# Patient Record
Sex: FEMALE | Race: WHITE | NOT HISPANIC OR LATINO | Employment: OTHER | URBAN - METROPOLITAN AREA
[De-identification: names, ages, dates, MRNs, and addresses within clinical notes are randomized per-mention and may not be internally consistent; named-entity substitution may affect disease eponyms.]

---

## 2017-03-24 ENCOUNTER — HOSPITAL ENCOUNTER (EMERGENCY)
Facility: MEDICAL CENTER | Age: 72
End: 2017-03-24
Attending: EMERGENCY MEDICINE
Payer: OTHER MISCELLANEOUS

## 2017-03-24 VITALS
HEART RATE: 78 BPM | WEIGHT: 118 LBS | BODY MASS INDEX: 21.71 KG/M2 | SYSTOLIC BLOOD PRESSURE: 135 MMHG | DIASTOLIC BLOOD PRESSURE: 81 MMHG | HEIGHT: 62 IN | RESPIRATION RATE: 16 BRPM | OXYGEN SATURATION: 91 % | TEMPERATURE: 97.7 F
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
## 2017-03-24 DIAGNOSIS — S16.1XXA NECK STRAIN, INITIAL ENCOUNTER: ICD-10-CM

## 2017-03-24 PROCEDURE — 99284 EMERGENCY DEPT VISIT MOD MDM: CPT

## 2017-03-24 ASSESSMENT — LIFESTYLE VARIABLES
HAVE YOU EVER FELT YOU SHOULD CUT DOWN ON YOUR DRINKING: NO
EVER FELT BAD OR GUILTY ABOUT YOUR DRINKING: NO
EVER HAD A DRINK FIRST THING IN THE MORNING TO STEADY YOUR NERVES TO GET RID OF A HANGOVER: NO
AVERAGE NUMBER OF DAYS PER WEEK YOU HAVE A DRINK CONTAINING ALCOHOL: 3
HOW MANY TIMES IN THE PAST YEAR HAVE YOU HAD 5 OR MORE DRINKS IN A DAY: 0
CONSUMPTION TOTAL: NEGATIVE
ON A TYPICAL DAY WHEN YOU DRINK ALCOHOL HOW MANY DRINKS DO YOU HAVE: 1
TOTAL SCORE: 0
DO YOU DRINK ALCOHOL: YES
TOTAL SCORE: 0
HAVE PEOPLE ANNOYED YOU BY CRITICIZING YOUR DRINKING: NO
TOTAL SCORE: 0

## 2017-03-24 NOTE — ED AVS SNAPSHOT
[employer] Access Code: Activation code not generated  Current [employer] Status: Active    HeyStakshart  A secure, online tool to manage your health information     [employer]’s [employer]® is a secure, online tool that connects you to your personalized health information from the privacy of your home -- day or night - making it very easy for you to manage your healthcare. Once the activation process is completed, you can even access your medical information using the [employer] kerrie, which is available for free in the Apple Kerrie store or Google Play store.     [employer] provides the following levels of access (as shown below):   My Chart Features   [facility] Primary Care Doctor [facility]  Specialists [facility]  Urgent  Care Non-[facility]  Primary Care  Doctor   Email your healthcare team securely and privately 24/7 X X X X   Manage appointments: schedule your next appointment; view details of past/upcoming appointments X      Request prescription refills. X      View recent personal medical records, including lab and immunizations X X X X   View health record, including health history, allergies, medications X X X X   Read reports about your outpatient visits, procedures, consult and ER notes X X X X   See your discharge summary, which is a recap of your hospital and/or ER visit that includes your diagnosis, lab results, and care plan. X X       How to register for [employer]:  1. Go to  [email or web address].  2. Click on the Sign Up Now box, which takes you to the New Member Sign Up page. You will need to provide the following information:  a. Enter your [employer] Access Code exactly as it appears at the top of this page. (You will not need to use this code after you’ve completed the sign-up process. If you do not sign up before the expiration date, you must request a new code.)   b. Enter your date of birth.   c. Enter your home email address.   d. Click Submit, and follow the next screen’s instructions.  3. Create a Encompass Media ID. This will  be your Blinkbuggy login ID and cannot be changed, so think of one that is secure and easy to remember.  4. Create a Blinkbuggy password. You can change your password at any time.  5. Enter your Password Reset Question and Answer. This can be used at a later time if you forget your password.   6. Enter your e-mail address. This allows you to receive e-mail notifications when new information is available in Blinkbuggy.  7. Click Sign Up. You can now view your health information.    For assistance activating your Blinkbuggy account, call [phone]
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
## 2017-03-24 NOTE — ED AVS SNAPSHOT
Home Care Instructions                                                                                                                Hermelinda Wasserman   MRN: [id number]    Department:  [facility], Emergency Dept   Date of Visit:  3/24/2017            [facility], Emergency Dept    [address]    [place] [address]    Phone:  [phone]      You were seen by     Obi Mcgee M.D.      Your Diagnosis Was     Neck strain, initial encounter     S16.1XXA       Follow-up Information     1. Schedule an appointment as soon as possible for a visit with Jane Queen M.D..    Specialty:  Family Medicine    Why:  As needed    Contact information    [address]  [place] [address] [phone]        Medication Information     Review all of your home medications and newly ordered medications with your primary doctor and/or pharmacist as soon as possible. Follow medication instructions as directed by your doctor and/or pharmacist.     Please keep your complete medication list with you and share with your physician. Update the information when medications are discontinued, doses are changed, or new medications (including over-the-counter products) are added; and carry medication information at all times in the event of emergency situations.               Medication List      ASK your doctor about these medications        Instructions    Morning Afternoon Evening Bedtime    alendronate 70 MG Tabs   Commonly known as:  FOSAMAX        TAKE 1 TABLET ORALLY EVERY 7 DAYS.TAKE ON AN EMPTY STOMACH.NO FOOD OR DRINK FOR 30 MIN                        aspirin 81 MG tablet        Take 81 mg by mouth every day.   Dose:  81 mg                        atorvastatin 20 MG Tabs   Commonly known as:  LIPITOR        Take 1 Tab by mouth every bedtime.   Dose:  20 mg                        CALCIUM 500 + D PO        Take  by mouth. 2 daily                        Cholecalciferol 400 UNIT Tabs      Commonly known as:  VITAMIN D        Take 400 Units by mouth every day.   Dose:  400 Units                        cyanocobalamin 500 MCG Tabs   Commonly known as:  VITAMIN B-12        Take 500 mcg by mouth every day.   Dose:  500 mcg                        FIBER SELECT GUMMIES PO        Take  by mouth.                        FISH OIL        Take 1,000 mg by mouth every day.   Dose:  1000 mg                        GLUCOSAMINE 1500 COMPLEX Caps        Take  by mouth.                        naproxen 500 MG Tabs   Commonly known as:  NAPROSYN        TAKE 1 TABLET BY MOUTH 2 TIMES A DAY, WITH MEALS.                        PROAIR  (90 BASE) MCG/ACT Aers inhalation aerosol   Generic drug:  albuterol        Doctor's comments:  Ok to use any formulation of albuterol inhaler per patient or insurance preference   INHALE 2 PUFFS BY MOUTH EVERY FOUR HOURS AS NEEDED FOR SHORTNESS OF BREATH.                        ranitidine 150 MG Tabs   Commonly known as:  ZANTAC        Take 1 Tab by mouth every day. To prevent ulcer   Dose:  150 mg                        tetanus-dipth-acell pertussis 5-2-15.5 LF-MCG/0.5 Susp   Commonly known as:  ADACEL        Doctor's comments:  Adacel or other form of Tdap OK   0.5 mL by Intramuscular route Once PRN for up to 1 dose.   Dose:  0.5 mL                        valacyclovir 1 GM Tabs   Commonly known as:  VALTREX        TAKE 1 TABLET TWICE A DAY                                  Discharge Instructions       Neck Injury  (Cervical Strain, Care After)    Take Tylenol or naproxen 375 mg every 8-12 hours unless an abscess the stomach..  Return for weakness or fever and headache.  See your doctor if not better in 7-10 days.    You had a borderline or high normal blood pressure reading today.  This does not necessarily mean you have hypertension.  Please followup with your/a primary physician for comprehensive blood pressure evaluation and yearly fasting cholesterol assessment.  BP Readings from  Last 3 Encounters:   03/24/17 135/81   09/09/16 118/80   09/01/16 110/80     .     You have a cervical strain. The muscles and ligaments in your neck have been stretched. The bones are not broken.    HOME CARE INSTRUCTIONS  Ø While awake, apply ice packs to the neck or areas of pain about every 1-2 hours, for 20 to 30 minutes at a time. Do this for 2 days or as directed. If you were given a cervical collar for support, ask your caregiver if you may remove it for bathing or applying ice.  Ø After 2 days, you may apply heat to the area for 20 to 30 minutes, 4 to 5 times a day.  Ø If given a [place] collar, wear as instructed. Do not remove any collar unless instructed by a caregiver.  Ø Take prescribed medication as directed. You may use ibuprofen (Advil® or Motrin®) and acetaminophen (Tylenol®) for discomfort. Only use these if your caregiver has not given medications that interfere    Recheck with the hospital or clinic after a radiologist has read your x-rays. Recheck with the hospital or clinic to make sure the initial readings are correct. Do this also to determine if you need further studies. It is your responsibility to find out your x-ray results. X-rays are sometimes repeated in one week to ten days. These are often repeated to make sure that a hairline fracture was not overlooked. Ask your caregiver how you are to find out about your radiology (x-ray) results.    seek immediate medical attention IF:  Ø You develop difficulties swallowing or breathing.  Ø You have numbness, weakness or movement problems in you or your arms or legs.  Ø You develop increasing pain which is uncontrolled with medications.    AGREEMENT BETWEEN PATIENT AND HEALTHCARE TEAM:  Your signature on this document represents an understanding between you and the healthcare team that took care of you today.  That means that you:  Ø Understand these discharge instructions.   Ø Will monitor your condition.  Ø Will seek immediate medical  attention as instructed.    Document Released: 12/18/2006  Document Re-Released: 06/30/2008  [employer]Care® Patient Information ©2008 [employer].            Patient Information     Patient Information    Following emergency treatment: all patient requiring follow-up care must return either to a private physician or a clinic if your condition worsens before you are able to obtain further medical attention, please return to the emergency room.     Billing Information    At [facility], we work to make the billing process streamlined for our patients.  Our Representatives are here to answer any questions you may have regarding your hospital bill.  If you have insurance coverage and have supplied your insurance information to us, we will submit a claim to your insurer on your behalf.  Should you have any questions regarding your bill, we can be reached online or by phone as follows:  Online: You are able pay your bills online or live chat with our representatives about any billing questions you may have. We are here to help Monday - Friday from 8:00am to 7:30pm and 9:00am - 12:00pm on Saturdays.  Please visit [email or web address]  for more information.   Phone:  [phone] or [phone]    Please note that your emergency physician, surgeon, pathologist, radiologist, anesthesiologist, and other specialists are not employed by [facility] and will therefore bill separately for their services.  Please contact them directly for any questions concerning their bills at the numbers below:     Emergency Physician Services:  [phone]  [facility]:  [phone]  [facility]:  [phone]  [facility]:  [phone]    1. Your final bill may vary from the amount quoted upon discharge if all procedures are not complete at that time, or if your doctor has additional procedures of which we are not aware. You will receive an additional bill if you  return to the Emergency Department at [facility] for suture removal regardless of the facility of which the sutures were placed.     2. Please arrange for settlement of this account at the emergency registration.    3. All self-pay accounts are due in full at the time of treatment.  If you are unable to meet this obligation then payment is expected within 4-5 days.     4. If you have had radiology studies (CT, X-ray, Ultrasound, MRI), you have received a preliminary result during your emergency department visit. Please contact the radiology department [phone] to receive a copy of your final result. Please discuss the Final result with your primary physician or with the follow up physician provided.     Crisis Hotline:  [place] Crisis Hotline:  9-867-AOAPBLU or 1-843.634.9102  [place] Crisis Hotline:    1-772.812.4277 or 259-995-6900         ED Discharge Follow Up Questions    1. In order to provide you with very good care, we would like to follow up with a phone call in the next few days.  May we have your permission to contact you?     YES /  NO    2. What is the best phone number to call you? (       )_____-__________    3. What is the best time to call you?      Morning  /  Afternoon  /  Evening                   Patient Signature:  ____________________________________________________________    Date:  ____________________________________________________________      Your appointments     Sep 11, 2017 10:10 AM   ANNUAL EXAM PREVENTATIVE with Jane Queen M.D.   [facility] [address] [place] [address])    [address] [place] [id number] [phone]

## 2017-03-24 NOTE — ED AVS SNAPSHOT
3/24/2017          Hermelinda Wasserman  [address] [address] [address]    Dear Hermelinda:    [facility] wants to ensure your discharge home is safe and you or your loved ones have had all your questions answered regarding your care after you leave the hospital.    You may receive a telephone call within two days of your discharge.  This call is to make certain you understand your discharge instructions as well as ensure we provided you with the best care possible during your stay with us.     The call will only last approximately 3-5 minutes and will be done by a nurse.    Once again, we want to ensure your discharge home is safe and that you have a clear understanding of any next steps in your care.  If you have any questions or concerns, please do not hesitate to contact us, we are here for you.  Thank you for choosing [facility] for your healthcare needs.    Sincerely,    Flako Muse    [facility]

## 2017-03-25 NOTE — DISCHARGE INSTRUCTIONS
Neck Injury  (Cervical Strain, Care After)    Take Tylenol or naproxen 375 mg every 8-12 hours unless an abscess the stomach..  Return for weakness or fever and headache.  See your doctor if not better in 7-10 days.    You had a borderline or high normal blood pressure reading today.  This does not necessarily mean you have hypertension.  Please followup with your/a primary physician for comprehensive blood pressure evaluation and yearly fasting cholesterol assessment.  BP Readings from Last 3 Encounters:   03/24/17 135/81   09/09/16 118/80   09/01/16 110/80     .     You have a cervical strain. The muscles and ligaments in your neck have been stretched. The bones are not broken.    HOME CARE INSTRUCTIONS  Ø While awake, apply ice packs to the neck or areas of pain about every 1-2 hours, for 20 to 30 minutes at a time. Do this for 2 days or as directed. If you were given a cervical collar for support, ask your caregiver if you may remove it for bathing or applying ice.  Ø After 2 days, you may apply heat to the area for 20 to 30 minutes, 4 to 5 times a day.  Ø If given a [place] collar, wear as instructed. Do not remove any collar unless instructed by a caregiver.  Ø Take prescribed medication as directed. You may use ibuprofen (Advil® or Motrin®) and acetaminophen (Tylenol®) for discomfort. Only use these if your caregiver has not given medications that interfere    Recheck with the hospital or clinic after a radiologist has read your x-rays. Recheck with the hospital or clinic to make sure the initial readings are correct. Do this also to determine if you need further studies. It is your responsibility to find out your x-ray results. X-rays are sometimes repeated in one week to ten days. These are often repeated to make sure that a hairline fracture was not overlooked. Ask your caregiver how you are to find out about your radiology (x-ray) results.    seek immediate medical attention IF:  Ø You develop  difficulties swallowing or breathing.  Ø You have numbness, weakness or movement problems in you or your arms or legs.  Ø You develop increasing pain which is uncontrolled with medications.    AGREEMENT BETWEEN PATIENT AND HEALTHCARE TEAM:  Your signature on this document represents an understanding between you and the healthcare team that took care of you today.  That means that you:  Ø Understand these discharge instructions.   Ø Will monitor your condition.  Ø Will seek immediate medical attention as instructed.    Document Released: 12/18/2006  Document Re-Released: 06/30/2008  [employer]® Patient Information ©2008 [employer], MyPublisher.
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
## 2017-04-03 ENCOUNTER — PATIENT MESSAGE (OUTPATIENT)
Dept: MEDICAL GROUP | Age: 72
End: 2017-04-03

## 2017-04-04 ENCOUNTER — NON-PROVIDER VISIT (OUTPATIENT)
Dept: MEDICAL GROUP | Age: 72
End: 2017-04-04
Payer: MEDICARE

## 2017-04-04 ENCOUNTER — OFFICE VISIT (OUTPATIENT)
Dept: MEDICAL GROUP | Age: 72
End: 2017-04-04
Payer: MEDICARE

## 2017-04-04 VITALS
WEIGHT: 121.6 LBS | SYSTOLIC BLOOD PRESSURE: 110 MMHG | HEIGHT: 64 IN | DIASTOLIC BLOOD PRESSURE: 68 MMHG | HEART RATE: 89 BPM | TEMPERATURE: 97.2 F | OXYGEN SATURATION: 97 % | BODY MASS INDEX: 20.76 KG/M2

## 2017-04-04 DIAGNOSIS — M62.838 NECK MUSCLE SPASM: ICD-10-CM

## 2017-04-04 DIAGNOSIS — M25.562 ACUTE PAIN OF LEFT KNEE: ICD-10-CM
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
## 2017-04-04 DIAGNOSIS — M54.2 NECK PAIN: ICD-10-CM

## 2017-04-04 DIAGNOSIS — Z23 NEED FOR VACCINATION: ICD-10-CM

## 2017-04-04 PROCEDURE — 99213 OFFICE O/P EST LOW 20 MIN: CPT | Performed by: PHYSICIAN ASSISTANT

## 2017-04-04 PROCEDURE — 90732 PPSV23 VACC 2 YRS+ SUBQ/IM: CPT | Performed by: PHYSICIAN ASSISTANT

## 2017-04-04 PROCEDURE — G0009 ADMIN PNEUMOCOCCAL VACCINE: HCPCS | Performed by: PHYSICIAN ASSISTANT

## 2017-04-04 RX ORDER — CEFTRIAXONE 1 G/1
1 INJECTION, POWDER, FOR SOLUTION INTRAMUSCULAR; INTRAVENOUS ONCE
Status: CANCELLED | OUTPATIENT
Start: 2017-04-04 | End: 2017-04-05

## 2017-04-04 ASSESSMENT — PATIENT HEALTH QUESTIONNAIRE - PHQ9: CLINICAL INTERPRETATION OF PHQ2 SCORE: 0
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
## 2017-04-04 NOTE — PROGRESS NOTES
Hermelinda Wasserman is a 71 y.o. female here for a non-provider visit for:   PNEUMOVAX (PPSV23)    Reason for immunization: Overdue/Provider Recommended  Immunization records indicate need for vaccine: Yes  Minimum interval has been met for this vaccine: Yes  ABN completed: Not Indicated    Order and dose verified by:  VIS Dated  4/24/15 was given to patient: Yes  IAC Questionnaire abnormal. Questionnaire reviewed and administration of injection approved by provider: Ashley Frankel    Patient tolerated injection and no adverse effects were observed or reported .    Pt scheduled for next dose in series: Not Indicated

## 2017-04-04 NOTE — MR AVS SNAPSHOT
Hermelinda Wasserman   2017 10:00 AM   Non-Provider Visit   MRN: [id number]    Department:  [address] [place] [address]   Dept Phone:  [phone]    Description:  Female : 1945   Provider:  MARY NI MA           Reason for Visit     Immunizations PPSV23      Allergies as of 2017     Allergen Noted Reactions    Iodine 2009       This is a possible reaction.  Reaction was to robitussin.  Dr caring for her thought it was r/t the iodine in robitussin    Nabumetone 2014       Neutropenia    Tape 2009       Tramadol 2016   Unspecified    Hard to move after taking      You were diagnosed with     Need for vaccination   [[id number]]         Vital Signs     Last Menstrual Period Smoking Status                1986 Never Smoker           Basic Information     Date Of Birth Sex Race Ethnicity Preferred Language    1945 Female White Non- English      Your appointments     2017  7:30 AM   MR EXTREMITY WITHOUT (30) with DOUBLE R MRI 1   IMAGING DOUBLE R. (Double R)    [address]  [place] [address]   [phone]            2017 10:00 AM   Access New To You with Johana Felix M.D.   [address])    [facility] [id number] [phone]            Sep 11, 2017 10:40 AM   ANNUAL EXAM PREVENTATIVE with Johana Felix M.D.   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   Unknown - Present    Leg length discrepancy M21.70   Unknown -  Present    Osteopenia with high risk of fracture M85.80   Unknown - Present    Preventative health care Z00.00   8/11/2010 - Present    Heart palpitations R00.2   8/18/2010 - Present    LBBB (left bundle branch block) I44.7   9/25/2010 - Present    Atrophic vaginitis N95.2   10/27/2010 - Present    Osteoarthritis M19.90   8/22/2011 - Present    Lumbar radicular pain M54.16   12/15/2012 - Present    Vitamin D insufficiency E55.9   3/17/2014 - Present    Neutropenia, drug-induced (CMS-HCC) D70.2   11/10/2014 - Present      Health Maintenance        Date Due Completion Dates    IMM PNEUMOCOCCAL 65+ (ADULT) LOW/MEDIUM RISK SERIES (2 of 2 - PPSV23) 6/5/2016 6/5/2015    MAMMOGRAM 8/22/2018 8/22/2016, 7/15/2015, 6/19/2014, 4/5/2013, 7/5/2012, 6/27/2011, 6/28/2010, 6/28/2010, 5/7/2009, 5/7/2009, 3/5/2008, 3/5/2008, 2/15/2007, 2/15/2007    BONE DENSITY 8/22/2018 8/22/2016, 6/19/2014, 6/27/2011, 5/7/2009    COLONOSCOPY 5/18/2021 5/18/2011 (Done)    Override on 5/18/2011: Done    IMM DTaP/Tdap/Td Vaccine (2 - Td) 6/5/2025 6/5/2015            Current Immunizations     13-VALENT PCV PREVNAR 6/5/2015    Influenza TIV (IM) 10/10/2013    Influenza Vaccine Adult HD 10/20/2015  1:08 PM, 10/14/2014    Pneumococcal polysaccharide vaccine (PPSV-23) 4/4/2017  9:56 AM    SHINGLES VACCINE 8/13/2010    Tdap Vaccine 6/5/2015      Below and/or attached are the medications your provider expects you to take. Review all of your home medications and newly ordered medications with your provider and/or pharmacist. Follow medication instructions as directed by your provider and/or pharmacist. Please keep your medication list with you and share with your provider. Update the information when medications are discontinued, doses are changed, or new medications (including over-the-counter products) are added; and carry medication information at all times in the event of emergency situations     Allergies:  IODINE - (reactions not documented)      NABUMETONE - (reactions not documented)     TAPE - (reactions not documented)     TRAMADOL - Unspecified               Medications  Valid as of: April 04, 2017 - 10:05 AM    Generic Name Brand Name Tablet Size Instructions for use    Albuterol Sulfate (Aero Soln) PROAIR  (90 BASE) MCG/ACT INHALE 2 PUFFS BY MOUTH EVERY FOUR HOURS AS NEEDED FOR SHORTNESS OF BREATH.        Alendronate Sodium (Tab) FOSAMAX 70 MG TAKE 1 TABLET ORALLY EVERY 7 DAYS.TAKE ON AN EMPTY STOMACH.NO FOOD OR DRINK FOR 30 MIN        Aspirin (Tab) aspirin 81 MG Take 81 mg by mouth every day.        Atorvastatin Calcium (Tab) LIPITOR 20 MG Take 1 Tab by mouth every bedtime.        Calcium Carbonate-Vitamin D   Take  by mouth. 2 daily        Cholecalciferol (Tab) VITAMIN D 400 UNIT Take 200 Units by mouth every day.        Cyanocobalamin (Tab) VITAMIN B-12 500 MCG Take 500 mcg by mouth every day.        Fiber   Take  by mouth.        Fish Oil   Take 1,000 mg by mouth every day.        Glucosamine-Chondroit-Vit C-Mn (Cap) GLUCOSAMINE 1500 COMPLEX  Take  by mouth.        Naproxen (Tab) NAPROSYN 500 MG TAKE 1 TABLET BY MOUTH 2 TIMES A DAY, WITH MEALS.        RaNITidine HCl (Tab) ZANTAC 150 MG Take 1 Tab by mouth every day. To prevent ulcer        Tetanus-Diphth-Acell Pertussis (Suspension) ADACEL 5-2-15.5 LF-MCG/0.5 0.5 mL by Intramuscular route Once PRN for up to 1 dose.        ValACYclovir HCl (Tab) VALTREX 1 GM TAKE 1 TABLET TWICE A DAY        .                 Medicines prescribed today were sent to:     [facility]/PHARMACY #[id number] - [place] - [address]    [address] [place] [address]    Phone: [phone] Fax: [phone]    Open 24 Hours?: No      Medication refill instructions:       If your prescription bottle indicates you have medication refills left, it is not necessary to call your provider’s office. Please contact your pharmacy and they will refill your medication.    If your prescription bottle indicates you do not have  any refills left, you may request refills at any time through one of the following ways: The online [employer] system (except Urgent Care), by calling your provider’s office, or by asking your pharmacy to contact your provider’s office with a refill request. Medication refills are processed only during regular business hours and may not be available until the next business day. Your provider may request additional information or to have a follow-up visit with you prior to refilling your medication.   *Please Note: Medication refills are assigned a new Rx number when refilled electronically. Your pharmacy may indicate that no refills were authorized even though a new prescription for the same medication is available at the pharmacy. Please request the medicine by name with the pharmacy before contacting your provider for a refill.        Other Notes About Your Plan     Old records reviewed.            [employer] Access Code: Activation code not generated  Current [employer] Status: Active

## 2017-05-02 ENCOUNTER — APPOINTMENT (OUTPATIENT)
Dept: RADIOLOGY | Facility: MEDICAL CENTER | Age: 72
End: 2017-05-02
Attending: ORTHOPAEDIC SURGERY
Payer: MEDICARE

## 2017-05-02 DIAGNOSIS — M25.562 LEFT KNEE PAIN, UNSPECIFIED CHRONICITY: ICD-10-CM

## 2017-05-02 PROCEDURE — 73721 MRI JNT OF LWR EXTRE W/O DYE: CPT | Mod: LT

## 2017-06-06 ENCOUNTER — OFFICE VISIT (OUTPATIENT)
Dept: MEDICAL GROUP | Age: 72
End: 2017-06-06
Payer: MEDICARE

## 2017-06-06 VITALS
DIASTOLIC BLOOD PRESSURE: 72 MMHG | OXYGEN SATURATION: 96 % | SYSTOLIC BLOOD PRESSURE: 110 MMHG | BODY MASS INDEX: 20.66 KG/M2 | TEMPERATURE: 99 F | WEIGHT: 121 LBS | HEIGHT: 64 IN | HEART RATE: 94 BPM

## 2017-06-06 DIAGNOSIS — E55.9 VITAMIN D INSUFFICIENCY: ICD-10-CM

## 2017-06-06 DIAGNOSIS — M15.9 PRIMARY OSTEOARTHRITIS INVOLVING MULTIPLE JOINTS: ICD-10-CM

## 2017-06-06 DIAGNOSIS — Z51.81 THERAPEUTIC DRUG MONITORING: ICD-10-CM

## 2017-06-06 DIAGNOSIS — E78.5 HYPERLIPIDEMIA WITH TARGET LDL LESS THAN 70: ICD-10-CM

## 2017-06-06 PROCEDURE — 4040F PNEUMOC VAC/ADMIN/RCVD: CPT | Performed by: FAMILY MEDICINE

## 2017-06-06 PROCEDURE — G8420 CALC BMI NORM PARAMETERS: HCPCS | Performed by: FAMILY MEDICINE

## 2017-06-06 PROCEDURE — 1036F TOBACCO NON-USER: CPT | Performed by: FAMILY MEDICINE

## 2017-06-06 PROCEDURE — G8432 DEP SCR NOT DOC, RNG: HCPCS | Performed by: FAMILY MEDICINE

## 2017-06-06 PROCEDURE — 1101F PT FALLS ASSESS-DOCD LE1/YR: CPT | Performed by: FAMILY MEDICINE

## 2017-06-06 PROCEDURE — 3017F COLORECTAL CA SCREEN DOC REV: CPT | Performed by: FAMILY MEDICINE

## 2017-06-06 PROCEDURE — 99214 OFFICE O/P EST MOD 30 MIN: CPT | Performed by: FAMILY MEDICINE

## 2017-06-06 PROCEDURE — 3014F SCREEN MAMMO DOC REV: CPT | Performed by: FAMILY MEDICINE

## 2017-06-06 NOTE — PROGRESS NOTES
Subjective:   CC: establish care, osteoarthritis    HPI:     Hermelinda Wasserman is a 71 y.o. female, established patient of the clinic, presents with the following concerns:     1. Primary osteoarthritis involving multiple joints  Pt has hx of chronic osteoarthritis in hands, ankles, knees, and hips.   She has been managing her symptoms with Naproxen 500 mg qd.   For the most part, her symptoms were well controlled.   However, she recently experiencing worsening of osteoarthritic pain in ankles, knees, and hands.   She modifies her daily activities to avoid pain, but denies significant disability due to symptoms.   She continues to do regular yoga, bicycling, swimming, and walking every weeks.   She feels somewhat unbalanced after pathological fracture of the right ankle requiring surgery.   She has been on NSAID for 12+ months and concerns of side effects.   She wants to discuss options, but declines all surgical interventions.   Pt states that she had full rheumatologic studies when she was living in [place]. She was told that she does not have Rheumatoid Arthritis or gout.     2. Hyperlipidemia with target LDL less than 70  Chronic, taking Lipitor 20 mg qhs, denies side effects.     3. Vitamin D insufficiency  Chronic, taking vitamin D 1929-8497 mg daily.     Current medicines (including changes today)  Current Outpatient Prescriptions   Medication Sig Dispense Refill   • naproxen (NAPROSYN) 500 MG Tab TAKE 1 TABLET BY MOUTH 2 TIMES A DAY, WITH MEALS. 60 Tab 5   • ranitidine (ZANTAC) 150 MG Tab Take 1 Tab by mouth every day. To prevent ulcer 90 Tab 3   • alendronate (FOSAMAX) 70 MG Tab TAKE 1 TABLET ORALLY EVERY 7 DAYS.TAKE ON AN EMPTY STOMACH.NO FOOD OR DRINK FOR 30 MIN 12 Tab 3   • atorvastatin (LIPITOR) 20 MG Tab Take 1 Tab by mouth every bedtime. 90 Tab 3   • valacyclovir (VALTREX) 1 GM Tab TAKE 1 TABLET TWICE A  Tab 1   • PROAIR  (90 BASE) MCG/ACT AERS inhalation aerosol INHALE 2 PUFFS BY MOUTH  "EVERY FOUR HOURS AS NEEDED FOR SHORTNESS OF BREATH. 1 Inhaler 3   • tetanus-dipth-acell pertussis (ADACEL) 5-2-15.5 LF-MCG/0.5 SUSP 0.5 mL by Intramuscular route Once PRN for up to 1 dose. 0.5 mL 0   • cyanocobalamin (VITAMIN B-12) 500 MCG TABS Take 500 mcg by mouth every day.     • Glucosamine-Chondroit-Vit C-Mn (GLUCOSAMINE 1500 COMPLEX) CAPS Take  by mouth.     • Cholecalciferol (VITAMIN D) 400 UNIT TABS Take 200 Units by mouth every day.     • FIBER SELECT GUMMIES PO Take  by mouth.     • aspirin 81 MG tablet Take 81 mg by mouth every day.     • CALCIUM 500 + D PO Take  by mouth. 2 daily     • FISH OIL Take 1,000 mg by mouth every day.       No current facility-administered medications for this visit.     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    I personally reviewed patient's problem list, allergies, medications, family hx, social hx with patient and update EPIC.     REVIEW OF SYSTEMS:  CONSTITUTIONAL:  Denies night sweats, fatigue, malaise, lethargy, fever or chills.  RESPIRATORY:  Denies cough, wheeze, hemoptysis, or shortness of breath.  CARDIOVASCULAR:  Denies chest pains, palpitations, pedal edema  GASTROINTESTINAL:  Denies abdominal pain, nausea or vomiting, diarrhea, constipation, hematemesis, hematochezia, melena.  GENITOURINARY:  Denies urinary urgency, frequency, dysuria, or hematuria.        Objective:     Blood pressure 110/72, pulse 94, temperature 37.2 °C (99 °F), height 1.619 m (5' 3.74\"), " weight 54.885 kg (121 lb), last menstrual period 03/01/1986, SpO2 96 %. Body mass index is 20.94 kg/(m^2).    Physical Exam:  Constitutional: awake, alert, in no distress.  Skin: Warm, dry, good turgor, no rashes, bruises, ulcers in visible areas.  Eye: conjunctiva clear, lids neg for edema or lesions.  ENMT: TM and auditory canals wnl. Oral and nasal mucosa wnl. Lips without lesions, good dentition, oropharynx clear.  Neck: Trachea midline, no masses, no thyromegaly. No cervical or supraclavicular lymphadenopathy  Respiratory: Unlabored respiratory effort, lungs clear to auscultation, no wheezes, no rhonchi.  Cardiovascular: Normal S1, S2, no murmur, no pedal edema.  Abdomen: Soft, non-tender to palpation, no hernia, no hepatosplenomegaly.  Neuro: CN2-12 grossly intact.    Psych: Oriented x3, affect and mood wnl, intact judgement and insight.   MSK:   Knee exam: no visible deformities, hematoma, effusion, no pain to palpation, ligamental exam wnl.   Hand exam: hands are warm and well perfused. All small joints are enlarged, firm, intact neurovascular exam.   Ankle exam: right ankle slightly enlarged with decreased ankle ROM. Left ankle exam within normal limit.     Assessment and Plan:   The following treatment plan was discussed    1. Primary osteoarthritis involving multiple joints  Chronic osteoarthritis of multiple joints of the upper and lower extremities.   Pt endorses chronic pain and mild disability due to pain.   She had pathologic fracture of right ankle in the past, and c/o unsteadiness and poor balance there after.   Plan:   - Tylenol 500-1000 mg TiD for pain  - ok to use NSAID PRN for flares, avoid excessive use of NSAID due to possible side effects.   - will avoid opiate due to poor balance and hx of pathologic fracture.   - continue daily exercise, yoga, and strength training. Recommended Kenny Chi to improve balance.   - REFERRAL TO OCCUPATIONAL THERAPY Reason for Therapy: Eval/Treat/Report  -  REFERRAL TO PHYSICAL THERAPY Reason for Therapy: Eval/Treat/Report    2. Hyperlipidemia with target LDL less than 70  Chronic, taking Lipitor 20 mg qhs w/o side effects, will continue. Plan:   - LIPID PROFILE; Future    3. Vitamin D insufficiency  - continue vitamin D supplement 2000 units qd.   - VITAMIN D,25 HYDROXY; Future    Ly TAVO Felix M.D.      Followup: Return in about 4 weeks (around 7/4/2017) for Annual wellness visit.    Please note that this dictation was created using voice recognition software. I have made every reasonable attempt to correct obvious errors, but I expect that there are errors of grammar and possibly content that I did not discover before finalizing the note.

## 2017-06-06 NOTE — MR AVS SNAPSHOT
"        Hermelinda Wasserman   2017 10:00 AM   Office Visit   MRN: [id number]    Department:  [address] [place] [address]   Dept Phone:  [phone]    Description:  Female : 1945   Provider:  Johana Felix M.D.           Reason for Visit     Establish Care pt would like to discuss her arthritis       Allergies as of 2017     Allergen Noted Reactions    Nabumetone 2014       Neutropenia    Tape 2009       Tramadol 2016   Unspecified    Hard to move after taking      You were diagnosed with     Primary osteoarthritis involving multiple joints   [0746149]       Hyperlipidemia with target LDL less than 70   [101193]       Vitamin D insufficiency   [294694]       Therapeutic drug monitoring   [475468]         Vital Signs     Blood Pressure Pulse Temperature Height Weight Body Mass Index    110/72 mmHg 94 37.2 °C (99 °F) 1.619 m (5' 3.74\") 54.885 kg (121 lb) 20.94 kg/m2    Oxygen Saturation Last Menstrual Period Smoking Status             96% 1986 Never Smoker          Basic Information     Date Of Birth Sex Race Ethnicity Preferred Language    1945 Female White Non- English      Your appointments     2017 10:00 AM   ANNUAL WELLNESS with Johana Felix M.D., [facility]    [address] [id number] [phone]              Problem List              ICD-10-CM Priority Class Noted - Resolved    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 Z15.89   Unknown - Present    Hyperlipidemia with target LDL less than 70 E78.5   Unknown - Present    GERD (gastroesophageal reflux disease) K21.9   Unknown - Present    Raynaud's phenomenon I73.00   Unknown - Present    Seasonal allergies J30.2   Unknown - Present    Post zoster neuralgia B02.29   Unknown - Present    Leg length discrepancy M21.70   Unknown - Present    Osteopenia with high risk of fracture M85.80   Unknown - Present    Heart palpitations R00.2   2010 - Present   " LBBB (left bundle branch block) I44.7   9/25/2010 - Present    Atrophic vaginitis N95.2   10/27/2010 - Present    Osteoarthritis M19.90   8/22/2011 - Present    Lumbar radicular pain M54.16   12/15/2012 - Present    Vitamin D insufficiency E55.9   3/17/2014 - Present      Health Maintenance        Date Due Completion Dates    MAMMOGRAM 8/22/2018 8/22/2016, 7/15/2015, 6/19/2014, 4/5/2013, 7/5/2012, 6/27/2011, 6/28/2010, 6/28/2010, 5/7/2009, 5/7/2009, 3/5/2008, 3/5/2008, 2/15/2007, 2/15/2007    BONE DENSITY 8/22/2018 8/22/2016, 6/19/2014, 6/27/2011, 5/7/2009    COLONOSCOPY 5/18/2021 5/18/2011 (Done)    Override on 5/18/2011: Done    IMM DTaP/Tdap/Td Vaccine (2 - Td) 6/5/2025 6/5/2015            Current Immunizations     13-VALENT PCV PREVNAR 6/5/2015    Influenza TIV (IM) 10/10/2013    Influenza Vaccine Adult HD 10/20/2015  1:08 PM, 10/14/2014    Pneumococcal polysaccharide vaccine (PPSV-23) 4/4/2017  9:56 AM    SHINGLES VACCINE 8/13/2010    Tdap Vaccine 6/5/2015      Below and/or attached are the medications your provider expects you to take. Review all of your home medications and newly ordered medications with your provider and/or pharmacist. Follow medication instructions as directed by your provider and/or pharmacist. Please keep your medication list with you and share with your provider. Update the information when medications are discontinued, doses are changed, or new medications (including over-the-counter products) are added; and carry medication information at all times in the event of emergency situations     Allergies:  NABUMETONE - (reactions not documented)     TAPE - (reactions not documented)     TRAMADOL - Unspecified               Medications  Valid as of: June 06, 2017 - 11:03 AM    Generic Name Brand Name Tablet Size Instructions for use    Albuterol Sulfate (Aero Soln) PROAIR  (90 BASE) MCG/ACT INHALE 2 PUFFS BY MOUTH EVERY FOUR HOURS AS NEEDED FOR SHORTNESS OF BREATH.        Alendronate  Sodium (Tab) FOSAMAX 70 MG TAKE 1 TABLET ORALLY EVERY 7 DAYS.TAKE ON AN EMPTY STOMACH.NO FOOD OR DRINK FOR 30 MIN        Aspirin (Tab) aspirin 81 MG Take 81 mg by mouth every day.        Atorvastatin Calcium (Tab) LIPITOR 20 MG Take 1 Tab by mouth every bedtime.        Calcium Carbonate-Vitamin D   Take  by mouth. 2 daily        Cholecalciferol (Tab) VITAMIN D 400 UNIT Take 200 Units by mouth every day.        Cyanocobalamin (Tab) VITAMIN B-12 500 MCG Take 500 mcg by mouth every day.        Fiber   Take  by mouth.        Fish Oil   Take 1,000 mg by mouth every day.        Glucosamine-Chondroit-Vit C-Mn (Cap) GLUCOSAMINE 1500 COMPLEX  Take  by mouth.        Naproxen (Tab) NAPROSYN 500 MG TAKE 1 TABLET BY MOUTH 2 TIMES A DAY, WITH MEALS.        RaNITidine HCl (Tab) ZANTAC 150 MG Take 1 Tab by mouth every day. To prevent ulcer        Tetanus-Diphth-Acell Pertussis (Suspension) ADACEL 5-2-15.5 LF-MCG/0.5 0.5 mL by Intramuscular route Once PRN for up to 1 dose.        ValACYclovir HCl (Tab) VALTREX 1 GM TAKE 1 TABLET TWICE A DAY        .                 Medicines prescribed today were sent to:     [facility] #[id number] - [place], [place] - [address]    [address] [place] [place] [address]    Phone: [phone] Fax: [phone]    Open 24 Hours?: No      Medication refill instructions:       If your prescription bottle indicates you have medication refills left, it is not necessary to call your provider’s office. Please contact your pharmacy and they will refill your medication.    If your prescription bottle indicates you do not have any refills left, you may request refills at any time through one of the following ways: The online [employer] system (except Urgent Care), by calling your provider’s office, or by asking your pharmacy to contact your provider’s office with a refill request. Medication refills are processed only during regular business hours and may not be available until the next business day. Your provider  may request additional information or to have a follow-up visit with you prior to refilling your medication.   *Please Note: Medication refills are assigned a new Rx number when refilled electronically. Your pharmacy may indicate that no refills were authorized even though a new prescription for the same medication is available at the pharmacy. Please request the medicine by name with the pharmacy before contacting your provider for a refill.        Your To Do List     Future Labs/Procedures Complete By Expires    CBC WITH DIFFERENTIAL  As directed 6/7/2018    COMP METABOLIC PANEL  As directed 6/7/2018    LIPID PROFILE  As directed 6/7/2018    VITAMIN D,25 HYDROXY  As directed 6/7/2018      Referral     A referral request has been sent to our patient care coordination department. Please allow 3-5 business days for us to process this request and contact you either by phone or mail. If you do not hear from us by the 5th business day, please call us at [phone].        Instructions    Take 500-1000 mg of Tylenol 3 times daily.   Ok take Naproxen as needed for pain flare.   Please continue daily exercise, occupational and physical therapies.   Johana Felix M.D.         Other Notes About Your Plan     Old records reviewed.            [employer] Access Code: Activation code not generated  Current [employer] Status: Active

## 2017-06-06 NOTE — PATIENT INSTRUCTIONS
Take 500-1000 mg of Tylenol 3 times daily.   Ok take Naproxen as needed for pain flare.   Please continue daily exercise, occupational and physical therapies.   Johana Felix M.D.

## 2017-06-14 ENCOUNTER — TELEPHONE (OUTPATIENT)
Dept: MEDICAL GROUP | Age: 72
End: 2017-06-14

## 2017-06-19 ENCOUNTER — HOSPITAL ENCOUNTER (OUTPATIENT)
Dept: LAB | Facility: MEDICAL CENTER | Age: 72
End: 2017-06-19
Attending: FAMILY MEDICINE
Payer: MEDICARE

## 2017-06-19 DIAGNOSIS — Z51.81 THERAPEUTIC DRUG MONITORING: ICD-10-CM

## 2017-06-19 DIAGNOSIS — E78.5 HYPERLIPIDEMIA WITH TARGET LDL LESS THAN 70: ICD-10-CM

## 2017-06-19 DIAGNOSIS — E55.9 VITAMIN D INSUFFICIENCY: ICD-10-CM

## 2017-06-19 LAB
25(OH)D3 SERPL-MCNC: 45 NG/ML (ref 30–100)
ALBUMIN SERPL BCP-MCNC: 3.7 G/DL (ref 3.2–4.9)
ALBUMIN/GLOB SERPL: 1.3 G/DL
ALP SERPL-CCNC: 50 U/L (ref 30–99)
ALT SERPL-CCNC: 9 U/L (ref 2–50)
ANION GAP SERPL CALC-SCNC: 5 MMOL/L (ref 0–11.9)
AST SERPL-CCNC: 14 U/L (ref 12–45)
BASOPHILS # BLD AUTO: 1.3 % (ref 0–1.8)
BASOPHILS # BLD: 0.05 K/UL (ref 0–0.12)
BILIRUB SERPL-MCNC: 0.4 MG/DL (ref 0.1–1.5)
BUN SERPL-MCNC: 19 MG/DL (ref 8–22)
CALCIUM SERPL-MCNC: 9.1 MG/DL (ref 8.5–10.5)
CHLORIDE SERPL-SCNC: 109 MMOL/L (ref 96–112)
CHOLEST SERPL-MCNC: 171 MG/DL (ref 100–199)
CO2 SERPL-SCNC: 25 MMOL/L (ref 20–33)
CREAT SERPL-MCNC: 0.82 MG/DL (ref 0.5–1.4)
EOSINOPHIL # BLD AUTO: 0.26 K/UL (ref 0–0.51)
EOSINOPHIL NFR BLD: 6.5 % (ref 0–6.9)
ERYTHROCYTE [DISTWIDTH] IN BLOOD BY AUTOMATED COUNT: 49.2 FL (ref 35.9–50)
GFR SERPL CREATININE-BSD FRML MDRD: >60 ML/MIN/1.73 M 2
GLOBULIN SER CALC-MCNC: 2.9 G/DL (ref 1.9–3.5)
GLUCOSE SERPL-MCNC: 98 MG/DL (ref 65–99)
HCT VFR BLD AUTO: 37.1 % (ref 37–47)
HDLC SERPL-MCNC: 53 MG/DL
HGB BLD-MCNC: 12.1 G/DL (ref 12–16)
IMM GRANULOCYTES # BLD AUTO: 0.01 K/UL (ref 0–0.11)
IMM GRANULOCYTES NFR BLD AUTO: 0.3 % (ref 0–0.9)
LDLC SERPL CALC-MCNC: 93 MG/DL
LYMPHOCYTES # BLD AUTO: 1.2 K/UL (ref 1–4.8)
LYMPHOCYTES NFR BLD: 30.1 % (ref 22–41)
MCH RBC QN AUTO: 31.1 PG (ref 27–33)
MCHC RBC AUTO-ENTMCNC: 32.6 G/DL (ref 33.6–35)
MCV RBC AUTO: 95.4 FL (ref 81.4–97.8)
MONOCYTES # BLD AUTO: 0.58 K/UL (ref 0–0.85)
MONOCYTES NFR BLD AUTO: 14.5 % (ref 0–13.4)
NEUTROPHILS # BLD AUTO: 1.89 K/UL (ref 2–7.15)
NEUTROPHILS NFR BLD: 47.3 % (ref 44–72)
NRBC # BLD AUTO: 0 K/UL
NRBC BLD AUTO-RTO: 0 /100 WBC
PLATELET # BLD AUTO: 286 K/UL (ref 164–446)
PMV BLD AUTO: 10.3 FL (ref 9–12.9)
POTASSIUM SERPL-SCNC: 4.3 MMOL/L (ref 3.6–5.5)
PROT SERPL-MCNC: 6.6 G/DL (ref 6–8.2)
RBC # BLD AUTO: 3.89 M/UL (ref 4.2–5.4)
SODIUM SERPL-SCNC: 139 MMOL/L (ref 135–145)
TRIGL SERPL-MCNC: 124 MG/DL (ref 0–149)
WBC # BLD AUTO: 4 K/UL (ref 4.8–10.8)

## 2017-06-19 PROCEDURE — 80061 LIPID PANEL: CPT

## 2017-06-19 PROCEDURE — 36415 COLL VENOUS BLD VENIPUNCTURE: CPT

## 2017-06-19 PROCEDURE — 80053 COMPREHEN METABOLIC PANEL: CPT

## 2017-06-19 PROCEDURE — 82306 VITAMIN D 25 HYDROXY: CPT

## 2017-06-19 PROCEDURE — 85025 COMPLETE CBC W/AUTO DIFF WBC: CPT

## 2017-06-22 ENCOUNTER — OFFICE VISIT (OUTPATIENT)
Dept: MEDICAL GROUP | Age: 72
End: 2017-06-22
Payer: MEDICARE

## 2017-06-22 VITALS
BODY MASS INDEX: 19.61 KG/M2 | DIASTOLIC BLOOD PRESSURE: 72 MMHG | HEART RATE: 82 BPM | OXYGEN SATURATION: 99 % | SYSTOLIC BLOOD PRESSURE: 122 MMHG | HEIGHT: 66 IN | WEIGHT: 122 LBS | TEMPERATURE: 97.5 F

## 2017-06-22 DIAGNOSIS — E78.5 HYPERLIPIDEMIA WITH TARGET LDL LESS THAN 70: ICD-10-CM

## 2017-06-22 DIAGNOSIS — I73.00 RAYNAUD'S PHENOMENON WITHOUT GANGRENE: ICD-10-CM

## 2017-06-22 DIAGNOSIS — Z00.00 MEDICARE ANNUAL WELLNESS VISIT, SUBSEQUENT: ICD-10-CM

## 2017-06-22 DIAGNOSIS — K21.00 GASTROESOPHAGEAL REFLUX DISEASE WITH ESOPHAGITIS: ICD-10-CM

## 2017-06-22 DIAGNOSIS — M19.042 OSTEOARTHRITIS OF BOTH HANDS, UNSPECIFIED OSTEOARTHRITIS TYPE: ICD-10-CM

## 2017-06-22 DIAGNOSIS — A60.04 HERPES SIMPLEX VULVOVAGINITIS: ICD-10-CM

## 2017-06-22 DIAGNOSIS — I44.7 LBBB (LEFT BUNDLE BRANCH BLOCK): ICD-10-CM

## 2017-06-22 DIAGNOSIS — M85.80 OSTEOPENIA WITH HIGH RISK OF FRACTURE: ICD-10-CM

## 2017-06-22 DIAGNOSIS — Z15.89 GENETIC SUSCEPTIBILITY TO DISEASE: ICD-10-CM

## 2017-06-22 DIAGNOSIS — M19.041 OSTEOARTHRITIS OF BOTH HANDS, UNSPECIFIED OSTEOARTHRITIS TYPE: ICD-10-CM

## 2017-06-22 DIAGNOSIS — B02.29 POST ZOSTER NEURALGIA: ICD-10-CM

## 2017-06-22 DIAGNOSIS — E55.9 VITAMIN D INSUFFICIENCY: ICD-10-CM

## 2017-06-22 PROCEDURE — G0439 PPPS, SUBSEQ VISIT: HCPCS | Performed by: FAMILY MEDICINE

## 2017-06-22 RX ORDER — ATORVASTATIN CALCIUM 10 MG/1
20 TABLET, FILM COATED ORAL
Qty: 90 TAB | Refills: 1 | Status: SHIPPED | OUTPATIENT
Start: 2017-06-22 | End: 2017-09-07 | Stop reason: SDUPTHER

## 2017-06-22 RX ORDER — VALACYCLOVIR HYDROCHLORIDE 1 G/1
TABLET, FILM COATED ORAL
Qty: 180 TAB | Refills: 1
Start: 2017-06-22 | End: 2017-07-31 | Stop reason: SDUPTHER

## 2017-06-22 RX ORDER — ACETAMINOPHEN 325 MG/1
650 TABLET ORAL EVERY 4 HOURS PRN
COMMUNITY
End: 2021-07-15

## 2017-06-22 ASSESSMENT — PATIENT HEALTH QUESTIONNAIRE - PHQ9: CLINICAL INTERPRETATION OF PHQ2 SCORE: 0

## 2017-06-22 NOTE — PROGRESS NOTES
Chief Complaint   Patient presents with   • Annual Wellness Visit     [facility]/         HPI:  Hermelinda Wasserman is a 72 y.o. female here for Medicare Annual Wellness Visit    Pt is doing well. 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     • atorvastatin (LIPITOR) 10 MG Tab Take 2 Tabs by mouth every bedtime. 90 Tab 1   • valacyclovir (VALTREX) 1 GM Tab Take 0.5 tablet once daily 180 Tab 1   • naproxen (NAPROSYN) 500 MG Tab TAKE 1 TABLET BY MOUTH 2 TIMES A DAY, WITH MEALS. 60 Tab 5   • ranitidine (ZANTAC) 150 MG Tab Take 1 Tab by mouth every day. To prevent ulcer 90 Tab 3   • alendronate (FOSAMAX) 70 MG Tab TAKE 1 TABLET ORALLY EVERY 7 DAYS.TAKE ON AN EMPTY STOMACH.NO FOOD OR DRINK FOR 30 MIN 12 Tab 3   • PROAIR  (90 BASE) MCG/ACT AERS inhalation aerosol INHALE 2 PUFFS BY MOUTH EVERY FOUR HOURS AS NEEDED FOR SHORTNESS OF BREATH. 1 Inhaler 3   • cyanocobalamin (VITAMIN B-12) 500 MCG TABS Take 500 mcg by mouth every day.     • Cholecalciferol (VITAMIN D) 400 UNIT TABS Take 2,000 Units by mouth every day.     • aspirin 81 MG tablet Take 81 mg by mouth every day.     • CALCIUM 500 + D PO Take  by mouth. 2 daily       No current facility-administered medications for this visit.        Patient is taking medications as noted in  medication list.  Current supplements as per medication list.   Chronic narcotic pain medicines: no    Allergies: Nabumetone; Tape; and Tramadol    Current social contact/activities: gym,Muslim,movie group,book group     Is patient current with immunizations?  Yes.     She  reports that she has never smoked. She has never used smokeless tobacco. She reports that she drinks about 2.4 oz of alcohol per week. She reports that she does not use illicit drugs.  Counseling given: Not Answered        DPA/Advanced Directive:  Patient does not have an Advanced Directive.  A packet and workshop information was given on Advanced Directives.    ROS:    Gait: Uses no assistive device   Ostomy: no   Other tubes: no   Amputations: no   Chronic oxygen use: no   Last eye exam: 02/15/2017, wears glasses for astigmatism.   Wears hearing aids: yes   : Denies incontinence.     Screening:    Depression Screening    Little interest or pleasure in doing things?  0 - not at all  Feeling down, depressed, or hopeless?  0 - not at all  Patient Health Questionnaire Score: 0  No depressive symptoms identified      Screening for Cognitive Impairment    Three Minute Recall (banana, sunrise, fence)  3/3    Draw clock face with all 12 numbers set to the hand to show 10 minutes past 11 o'clock  1    No cognitive concerns identified      Fall Risk Assessment    Has the patient had two or more falls in the last year or any fall with injury in the last year?  No  No Fall Risk identified      Safety Assessment    Throw rugs on floor.  Yes  Handrails on all stairs.  Yes  Good lighting in all hallways.  Yes  Difficulty hearing.  Yes  Patient counseled about all safety risks that were identified.    Functional Assessment ADLs    Are there any barriers preventing you from cooking for yourself or meeting nutritional needs?  No.    Are there any barriers preventing you from driving safely or obtaining transportation?  No.    Are there any barriers preventing  you from using a telephone or calling for help?  No.    Are there any barriers preventing you from shopping?  No.    Are there any barriers preventing you from taking care of your own finances?  No.    Are there any barriers preventing you from managing your medications?  No.    Are currently engaging any exercise or physical activity?  Yes.  Swim,yoga,tread mill,run,walk 3 miles    Health Maintenance Summary                Annual Wellness Visit Overdue 1945     MAMMOGRAM Next Due 8/22/2018      Done 8/22/2016 MA-SCREEN MAMMO W/CAD-BILAT     Patient has more history with this topic...    BONE DENSITY Next Due 8/22/2018      Done 8/22/2016 DS-BONE DENSITY STUDY (DEXA)     Patient has more history with this topic...    COLONOSCOPY Next Due 5/18/2021      Done 5/18/2011     IMM DTaP/Tdap/Td Vaccine Next Due 6/5/2025      Done 6/5/2015 Imm Admin: Tdap Vaccine          Patient Care Team:  Johana Felix M.D. as PCP - General (Family Medicine)  HELADIO RamirezP.TONJA as Consulting Physician (OB/Gyn)  Ar Diaz M.D. as Consulting Physician (Orthopaedics)  Pop Bobby Jr., M.D. as Consulting Physician (Gastroenterology)  HELADIO CroftP.TONJA as Consulting Physician (Dermatology)  Pankaj Beal M.D. (Orthopaedics)  JENI Garcia (Orthopaedics)    Social History   Substance Use Topics   • Smoking status: Never Smoker    • Smokeless tobacco: Never Used   • Alcohol Use: 2.4 oz/week     4 Glasses of wine per week      Comment: 2-4 per week     Family History   Problem Relation Age of Onset   • Heart Disease Father      d. 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   Past Surgical History   Procedure Laterality Date   • Hand surgery  1978     ORIF left ring finger   • Hysterectomy, vaginal  1985   • Tonsillectomy and adenoidectomy  as child   • Cataract extraction with iol  2007, 2008     Bilateral   • Appendectomy  1980     was in [place]   • Laparotomy  1980     excision uterine fibroid   • Hardware removal ortho  1979     left ring finger   • Knee arthroscopy  12/29/2011     Performed by BRADFORD WEBB at [facility]   • Medial meniscectomy  12/29/2011     Performed by BRADFORD WEBB at [facility]   • Ankle orif  1/6/15     [facility]           Exam:     Blood pressure 122/72, pulse 82, temperature 36.4 °C (97.5 °F), height 1.67 m (5' 5.75\"), weight 55.339 kg (122 lb), last menstrual period 03/01/1986, SpO2 99 %. Body mass index is 19.84 kg/(m^2).    Hearing bilateral hearing aides.    Dentition good  Alert, oriented in no acute distress.  Eye contact is good, speech goal directed, affect calm    Assessment and Plan. The following treatment and monitoring plan is recommended:    1. Hyperlipidemia with target LDL less than 70  Chronic, well controlled with Lipitor. Will reduce Lipitor from 20 to 10 mg qhs. Plan:   atorvastatin (LIPITOR) 10 MG Tab       2. Osteopenia with high risk of fracture  Osteopenia + hx of ankle fracture, on Fosamax since 2015, doing well, will continue.    3. Herpes simplex vulvovaginitis  Chronic, on Valtrex 0.5 g daily for chronic suppression. Doing well, does not remember her last out break.    4. Vitamin D insufficiency  Taking 2000 units of vitamin D per day.    5. Osteoarthritis of both hands, unspecified osteoarthritis type  Take " Tylenol and Naproxen PRN for symptoms.    6. LBBB (left bundle branch block)  Stable, used to follow up with cardiology, negative workups. Will monitor.    7. Gastroesophageal reflux disease with esophagitis  Chronic, taking Zantac PRN for symptoms.   Also modifies her diet to manage symptoms.    8. Raynaud's phenomenon without gangrene  Chronic, does not bother patient, will monitor   9. 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 Medicare annual wellness visit, subsequent  Annual Wellness Visit - Includes PPPS Subsequent ()              Services suggested: No services needed at this time  Health Care Screening recommendations as per orders if indicated.  Referrals offered: PT/OT/Nutrition counseling/Behavioral Health/Smoking cessation as per orders if indicated.    Discussion today about general wellness and lifestyle habits:    · Prevent falls and reduce trip hazards; Cautioned about securing or removing rugs.  · Have a working fire alarm and carbon monoxide detector;   · Engage in regular physical activity and social activities       Follow-up: Return in about 1 year (around 6/22/2018) for Annual wellness visit, Long.

## 2017-07-31 DIAGNOSIS — A60.04 HERPES SIMPLEX VULVOVAGINITIS: ICD-10-CM

## 2017-07-31 RX ORDER — VALACYCLOVIR HYDROCHLORIDE 1 G/1
TABLET, FILM COATED ORAL
Qty: 180 TAB | Refills: 0 | Status: SHIPPED | OUTPATIENT
Start: 2017-07-31 | End: 2018-07-09 | Stop reason: SDUPTHER

## 2017-08-25 DIAGNOSIS — M19.042 OSTEOARTHRITIS OF BOTH HANDS, UNSPECIFIED OSTEOARTHRITIS TYPE: ICD-10-CM

## 2017-08-25 DIAGNOSIS — M19.041 OSTEOARTHRITIS OF BOTH HANDS, UNSPECIFIED OSTEOARTHRITIS TYPE: ICD-10-CM

## 2017-08-25 RX ORDER — RANITIDINE 150 MG/1
150 TABLET ORAL DAILY
Qty: 90 TAB | Refills: 3 | Status: SHIPPED | OUTPATIENT
Start: 2017-08-25 | End: 2018-01-18 | Stop reason: SDUPTHER

## 2017-09-07 DIAGNOSIS — E78.5 HYPERLIPIDEMIA WITH TARGET LDL LESS THAN 70: ICD-10-CM

## 2017-09-08 RX ORDER — ATORVASTATIN CALCIUM 10 MG/1
20 TABLET, FILM COATED ORAL
Qty: 90 TAB | Refills: 0 | Status: SHIPPED | OUTPATIENT
Start: 2017-09-08 | End: 2018-04-25 | Stop reason: SDUPTHER

## 2017-10-02 ENCOUNTER — APPOINTMENT (OUTPATIENT)
Dept: SOCIAL WORK | Facility: CLINIC | Age: 72
End: 2017-10-02
Payer: MEDICARE

## 2017-10-02 PROCEDURE — G0008 ADMIN INFLUENZA VIRUS VAC: HCPCS | Performed by: REGISTERED NURSE

## 2017-10-02 PROCEDURE — 90662 IIV NO PRSV INCREASED AG IM: CPT | Performed by: REGISTERED NURSE

## 2017-12-10 DIAGNOSIS — M85.80 OSTEOPENIA WITH HIGH RISK OF FRACTURE: ICD-10-CM

## 2017-12-11 RX ORDER — ALENDRONATE SODIUM 70 MG/1
TABLET ORAL
Qty: 12 TAB | Refills: 1 | Status: SHIPPED | OUTPATIENT
Start: 2017-12-11 | End: 2018-07-09

## 2017-12-18 DIAGNOSIS — Z12.31 ENCOUNTER FOR SCREENING MAMMOGRAM FOR BREAST CANCER: ICD-10-CM

## 2017-12-19 ENCOUNTER — APPOINTMENT (RX ONLY)
Dept: URBAN - METROPOLITAN AREA CLINIC 20 | Facility: CLINIC | Age: 72
Setting detail: DERMATOLOGY
End: 2017-12-19

## 2017-12-19 DIAGNOSIS — D18.0 HEMANGIOMA: ICD-10-CM

## 2017-12-19 DIAGNOSIS — D22 MELANOCYTIC NEVI: ICD-10-CM

## 2017-12-19 DIAGNOSIS — Z71.89 OTHER SPECIFIED COUNSELING: ICD-10-CM

## 2017-12-19 DIAGNOSIS — L57.0 ACTINIC KERATOSIS: ICD-10-CM

## 2017-12-19 DIAGNOSIS — L81.4 OTHER MELANIN HYPERPIGMENTATION: ICD-10-CM

## 2017-12-19 DIAGNOSIS — L82.1 OTHER SEBORRHEIC KERATOSIS: ICD-10-CM

## 2017-12-19 PROBLEM — D22.62 MELANOCYTIC NEVI OF LEFT UPPER LIMB, INCLUDING SHOULDER: Status: ACTIVE | Noted: 2017-12-19

## 2017-12-19 PROBLEM — D22.5 MELANOCYTIC NEVI OF TRUNK: Status: ACTIVE | Noted: 2017-12-19

## 2017-12-19 PROBLEM — D18.01 HEMANGIOMA OF SKIN AND SUBCUTANEOUS TISSUE: Status: ACTIVE | Noted: 2017-12-19

## 2017-12-19 PROBLEM — D22.61 MELANOCYTIC NEVI OF RIGHT UPPER LIMB, INCLUDING SHOULDER: Status: ACTIVE | Noted: 2017-12-19

## 2017-12-19 PROCEDURE — ? SUNSCREEN RECOMMENDATIONS

## 2017-12-19 PROCEDURE — 99214 OFFICE O/P EST MOD 30 MIN: CPT | Mod: 25

## 2017-12-19 PROCEDURE — ? LIQUID NITROGEN

## 2017-12-19 PROCEDURE — ? COUNSELING

## 2017-12-19 PROCEDURE — 17003 DESTRUCT PREMALG LES 2-14: CPT

## 2017-12-19 PROCEDURE — 17000 DESTRUCT PREMALG LESION: CPT

## 2017-12-19 ASSESSMENT — LOCATION ZONE DERM
LOCATION ZONE: NOSE
LOCATION ZONE: FACE
LOCATION ZONE: TRUNK
LOCATION ZONE: HAND
LOCATION ZONE: ARM
LOCATION ZONE: EAR

## 2017-12-19 ASSESSMENT — LOCATION SIMPLE DESCRIPTION DERM
LOCATION SIMPLE: RIGHT EAR
LOCATION SIMPLE: LEFT HAND
LOCATION SIMPLE: RIGHT HAND
LOCATION SIMPLE: RIGHT UPPER BACK
LOCATION SIMPLE: LEFT FOREARM
LOCATION SIMPLE: RIGHT FOREHEAD
LOCATION SIMPLE: NOSE
LOCATION SIMPLE: UPPER BACK
LOCATION SIMPLE: RIGHT FOREARM

## 2017-12-19 ASSESSMENT — LOCATION DETAILED DESCRIPTION DERM
LOCATION DETAILED: RIGHT ANTIHELIX
LOCATION DETAILED: SUPERIOR THORACIC SPINE
LOCATION DETAILED: LEFT PROXIMAL DORSAL FOREARM
LOCATION DETAILED: NASAL TIP
LOCATION DETAILED: RIGHT INFERIOR UPPER BACK
LOCATION DETAILED: INFERIOR THORACIC SPINE
LOCATION DETAILED: RIGHT RADIAL DORSAL HAND
LOCATION DETAILED: RIGHT VENTRAL DISTAL FOREARM
LOCATION DETAILED: LEFT VENTRAL PROXIMAL FOREARM
LOCATION DETAILED: LEFT RADIAL DORSAL HAND
LOCATION DETAILED: RIGHT MEDIAL UPPER BACK
LOCATION DETAILED: RIGHT INFERIOR MEDIAL FOREHEAD
LOCATION DETAILED: RIGHT PROXIMAL DORSAL FOREARM
LOCATION DETAILED: NASAL SUPRATIP

## 2018-01-04 ENCOUNTER — HOSPITAL ENCOUNTER (OUTPATIENT)
Dept: RADIOLOGY | Facility: MEDICAL CENTER | Age: 73
End: 2018-01-04
Attending: FAMILY MEDICINE
Payer: MEDICARE

## 2018-01-04 DIAGNOSIS — Z12.31 ENCOUNTER FOR SCREENING MAMMOGRAM FOR BREAST CANCER: ICD-10-CM

## 2018-01-04 PROCEDURE — 77067 SCR MAMMO BI INCL CAD: CPT

## 2018-01-17 ENCOUNTER — PATIENT MESSAGE (OUTPATIENT)
Dept: MEDICAL GROUP | Age: 73
End: 2018-01-17

## 2018-01-17 DIAGNOSIS — M19.041 OSTEOARTHRITIS OF BOTH HANDS, UNSPECIFIED OSTEOARTHRITIS TYPE: ICD-10-CM

## 2018-01-17 DIAGNOSIS — K21.00 GASTROESOPHAGEAL REFLUX DISEASE WITH ESOPHAGITIS: ICD-10-CM

## 2018-01-17 DIAGNOSIS — M19.042 OSTEOARTHRITIS OF BOTH HANDS, UNSPECIFIED OSTEOARTHRITIS TYPE: ICD-10-CM

## 2018-01-18 RX ORDER — RANITIDINE HCL 75 MG
75 TABLET ORAL DAILY
Qty: 90 TAB | Refills: 1 | Status: SHIPPED | OUTPATIENT
Start: 2018-01-18 | End: 2020-07-14

## 2018-04-16 ENCOUNTER — OFFICE VISIT (OUTPATIENT)
Dept: CARDIOLOGY | Facility: MEDICAL CENTER | Age: 73
End: 2018-04-16
Payer: MEDICARE

## 2018-04-16 VITALS
HEART RATE: 85 BPM | BODY MASS INDEX: 21.62 KG/M2 | DIASTOLIC BLOOD PRESSURE: 70 MMHG | SYSTOLIC BLOOD PRESSURE: 150 MMHG | WEIGHT: 122 LBS | OXYGEN SATURATION: 98 % | HEIGHT: 63 IN

## 2018-04-16 DIAGNOSIS — R00.2 PALPITATIONS: ICD-10-CM

## 2018-04-16 DIAGNOSIS — I44.7 LBBB (LEFT BUNDLE BRANCH BLOCK): ICD-10-CM

## 2018-04-16 DIAGNOSIS — R07.89 CHEST DISCOMFORT: ICD-10-CM

## 2018-04-16 DIAGNOSIS — E78.5 DYSLIPIDEMIA: ICD-10-CM

## 2018-04-16 PROCEDURE — 93000 ELECTROCARDIOGRAM COMPLETE: CPT | Performed by: INTERNAL MEDICINE

## 2018-04-16 PROCEDURE — 99214 OFFICE O/P EST MOD 30 MIN: CPT | Performed by: INTERNAL MEDICINE

## 2018-04-16 RX ORDER — CHOLECALCIFEROL (VITAMIN D3) 50 MCG
TABLET ORAL DAILY
COMMUNITY

## 2018-04-16 ASSESSMENT — ENCOUNTER SYMPTOMS
WHEEZING: 0
BLURRED VISION: 0
HEMOPTYSIS: 0
FEVER: 0
LOSS OF CONSCIOUSNESS: 0
CHILLS: 0
DEPRESSION: 0
COUGH: 1
PALPITATIONS: 1
EYE PAIN: 0
EYE DISCHARGE: 0
SHORTNESS OF BREATH: 1
BRUISES/BLEEDS EASILY: 1
ABDOMINAL PAIN: 0
MYALGIAS: 0
NAUSEA: 0
NERVOUS/ANXIOUS: 0
VOMITING: 0
SPEECH CHANGE: 0
HEARTBURN: 1

## 2018-04-16 NOTE — LETTER
[facility]   [address]   [address]  [place] [address]  Phone: [phone]  Fax: [phone]              Hermeilnda Wasserman  1945    Encounter Date: 4/16/2018    Andre Dunn M.D.          PROGRESS NOTE:  Chief Complaint   Patient presents with   • Chest Pain       Subjective:   Hermelinda Wasserman is a 72 y.o. female who presents today for new patient evaluation because of chest discomfort, a family history of premature coronary artery disease, dyslipidemia, with bundle branch block and palpitations.    She's had some chest discomfort over about the last week. It was occurring about 3-4 times a day but she's only noticed it about once a day for the last couple of days. This is a sharp discomfort which may last 1-2 minutes. It's in the substernal region and does not radiate. She rates it as about 6-7/10 and it is not associated with any nausea, vomiting, diaphoresis or shortness of breath. Shesaid in the morning she may awaken and then be anxious. This may trigger this discomfort. She may also notice it at rest. It is not exertional in nature. She feels that it seems to improve if she relaxes.    She's had some difficulty with life stress in the last couple of months. She notes some dyspnea on exertion but states that this is because she is holding her breath intermittently. She occasionally feels that she needs to take a deep breath. She exercises for at least an hour a day without difficulty. She is noted no PND, orthopnea or edema. Recently she's noted some episodes of palpitations with a fluttering in her chest. This lasts less than a minute. Is not associated with any lightheadedness.    Past Medical History:   Diagnosis Date   • Ankle fracture 1/18/2015    Unstable fracture 2014. Sees Ortho.   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 Hamilton   • Leg length discrepancy    • Lumbar radicular pain 12/15/2012   • Osteoarthritis 8/22/2011   • Osteopenia    • Palpitations    • Raynaud's phenomenon    • Seasonal allergies    • Tinnitus    • Varicose vein of leg      Past Surgical History:   Procedure Laterality Date   • ANKLE ORIF  1/6/15    [facility]   • KNEE ARTHROSCOPY  12/29/2011    Performed by BRADFORD HAMILTON at SURGERY [facility]S   • MEDIAL MENISCECTOMY  12/29/2011    Performed by BRADFORD HAMILTON at SURGERY [facility]   • HYSTERECTOMY, VAGINAL  1985   • APPENDECTOMY  1980    was in [place]   • LAPAROTOMY  1980    excision uterine fibroid   • HARDWARE REMOVAL ORTHO  1979    left ring finger   • HAND SURGERY  1978    ORIF left ring finger   • CATARACT EXTRACTION WITH IOL  2007, 2008    Bilateral   • TONSILLECTOMY AND ADENOIDECTOMY  as child     Family History   Problem Relation Age of Onset   • Heart Disease Father      d. MI age 56   • Genitourinary () Mother      CKD   • Heart Disease Brother 42     CABG x multi vessel   • Diabetes Brother      DM2     Social History     Social History   • Marital status:      Spouse name: N/A   • Number of children: N/A   • Years of education: N/A     Occupational History   • Not on file.     Social History Main Topics   • Smoking status: Never Smoker   • Smokeless tobacco: Never Used   • Alcohol use 2.4 oz/week     4 Glasses of wine per week      Comment: 2-4 per week   • Drug use: No   • Sexual activity: Yes     Partners: Male     Other Topics Concern   • Not on file     Social History Narrative    Swims, does weights, and golfs 4-5 x/week.     . Retired Dental Hygienist.      Allergies   Allergen Reactions   •  Nabumetone      Neutropenia   • Tape      adheavise   • Tramadol Unspecified     Hard to move after taking     Outpatient Encounter Prescriptions as of 4/16/2018   Medication Sig Dispense Refill   • Cholecalciferol (VITAMIN D) 2000 UNIT Tab Take  by mouth every day.     • ranitidine (ZANTAC) 75 MG tablet Take 1 Tab by mouth every day. 90 Tab 1   • atorvastatin (LIPITOR) 10 MG Tab Take 2 Tabs by mouth every bedtime. 90 Tab 0   • valacyclovir (VALTREX) 1 GM Tab Take 0.5 tablet once daily 180 Tab 0   • acetaminophen (TYLENOL) 325 MG Tab Take 650 mg by mouth every four hours as needed.     • naproxen (NAPROSYN) 500 MG Tab TAKE 1 TABLET BY MOUTH 2 TIMES A DAY, WITH MEALS. 60 Tab 5   • cyanocobalamin (VITAMIN B-12) 500 MCG TABS Take 500 mcg by mouth every day.     • aspirin 81 MG tablet Take 81 mg by mouth every day.     • CALCIUM 500 + D PO Take  by mouth. 2 daily     • alendronate (FOSAMAX) 70 MG Tab TAKE 1 TABLET BY MOUTH EVERY 7 DAYS. TAKE ON AN EMPTY STOMACH. NO FOOD OR DRINK FOR 30 MIN 12 Tab 1   • PROAIR  (90 BASE) MCG/ACT AERS inhalation aerosol INHALE 2 PUFFS BY MOUTH EVERY FOUR HOURS AS NEEDED FOR SHORTNESS OF BREATH. 1 Inhaler 3   • Cholecalciferol (VITAMIN D) 400 UNIT TABS Take 2,000 Units by mouth every day.       No facility-administered encounter medications on file as of 4/16/2018.      Review of Systems   Constitutional: Negative for chills and fever.   HENT: Positive for hearing loss and tinnitus. Negative for congestion.    Eyes: Negative for blurred vision, pain and discharge.   Respiratory: Positive for cough and shortness of breath. Negative for hemoptysis and wheezing.    Cardiovascular: Positive for chest pain and palpitations.   Gastrointestinal: Positive for heartburn. Negative for abdominal pain, nausea and vomiting.   Musculoskeletal: Positive for joint pain. Negative for myalgias.   Skin: Negative for itching and rash.   Neurological: Negative for speech change and loss of  "consciousness.   Endo/Heme/Allergies: Positive for environmental allergies. Bruises/bleeds easily.   Psychiatric/Behavioral: Negative for depression. The patient is not nervous/anxious.    All other systems reviewed and are negative.       Objective:   /70   Pulse 85   Ht 1.588 m (5' 2.5\")   Wt 55.3 kg (122 lb)   LMP 03/01/1986   SpO2 98%   BMI 21.96 kg/m²      Physical Exam   Constitutional: She is oriented to person, place, and time. She appears well-developed and well-nourished. No distress.   HENT:   Head: Normocephalic and atraumatic.   Mouth/Throat: Mucous membranes are normal.   Neck: No JVD present. No thyromegaly present.   Cardiovascular: Normal rate, regular rhythm and intact distal pulses.  Exam reveals no gallop.    No murmur heard.  Pulmonary/Chest: Effort normal and breath sounds normal. She has no rales.   Abdominal: Soft. There is no splenomegaly or hepatomegaly.   Musculoskeletal: Normal range of motion. She exhibits no edema.   Lymphadenopathy:     She has no cervical adenopathy.   Neurological: She is alert and oriented to person, place, and time. She has normal strength. She exhibits normal muscle tone.   Skin: Skin is warm and dry. No rash noted.   Psychiatric: She has a normal mood and affect. Her behavior is normal.     Lab Results   Component Value Date/Time    CHOLSTRLTOT 171 06/19/2017 07:01 AM    LDL 93 06/19/2017 07:01 AM    HDL 53 06/19/2017 07:01 AM    TRIGLYCERIDE 124 06/19/2017 07:01 AM       Lab Results   Component Value Date/Time    SODIUM 139 06/19/2017 07:01 AM    POTASSIUM 4.3 06/19/2017 07:01 AM    CHLORIDE 109 06/19/2017 07:01 AM    CO2 25 06/19/2017 07:01 AM    GLUCOSE 98 06/19/2017 07:01 AM    BUN 19 06/19/2017 07:01 AM    CREATININE 0.82 06/19/2017 07:01 AM    CREATININE 0.82 05/06/2013 07:59 AM    BUNCREATRAT 21 05/06/2013 07:59 AM     Lab Results   Component Value Date/Time    ALKPHOSPHAT 50 06/19/2017 07:01 AM    ASTSGOT 14 06/19/2017 07:01 AM    ALTSGPT 9 " 06/19/2017 07:01 AM    TBILIRUBIN 0.4 06/19/2017 07:01 AM      No results found for: BNPBTYPENAT       Echocardiogram:  CONCLUSIONS  Normal left ventricular size. Normal left ventricular wall thickness.   Abnormal (paradoxical) septal motion consistent with left bundle branch   block. Left ventricular ejection fraction is 50% to 55%. Grade I   diastolic dysfunction - mitral inflow E/A is <1.0.   Mild mitral regurgitation.  Aortic sclerosis without stenosis. No aortic insufficiency.   Trace tricuspid regurgitation. Right ventricular systolic pressure is   estimated to be 12-17 mmHg.  Normal pericardium without effusion.  Normal aortic root diameter 2.5 cm.  No prior study for comparison.    KATERINA WASSERMAN  Exam Date:         09/23/2014       EKG: Shows a normal sinus rhythm with occasional PVCs and an incomplete left bundle-branch block pattern.    Assessment:     1. Chest discomfort     2. Dyslipidemia     3. LBBB (left bundle branch block)     4. Palpitations         Medical Decision Making:  Today's Assessment / Status / Plan:     Ms. Wasserman is having difficulty with atypical chest discomfort which is fairly brief. She exercises regularly without difficulty. She does have a strong family history of premature coronary artery disease. At this time, she'll be further evaluated with a cardiac CT scan for calcium scoring. She'll follow-up in about a week and we'll decide whether or not to proceed any further evaluation. She has had palpitations in the past which seem to be stress related. If these are progressive we'll consider further evaluation though they seem to be self-limited and she is not lightheaded with them. Obviously, if she has any coronary calcification we will need to be more aggressive with her lipid-lowering.    Given her mildly elevated blood pressure today and her palpitations, we will obtain an echocardiogram. Her last echo was in 2014.      Johana Felix M.D.  [facility] [address] [place] [address]  VIA In  Basket

## 2018-04-16 NOTE — PROGRESS NOTES
Chief Complaint   Patient presents with   • Chest Pain       Subjective:   Hermelinda Wasserman is a 72 y.o. female who presents today for new patient evaluation because of chest discomfort, a family history of premature coronary artery disease, dyslipidemia, with bundle branch block and palpitations.    She's had some chest discomfort over about the last week. It was occurring about 3-4 times a day but she's only noticed it about once a day for the last couple of days. This is a sharp discomfort which may last 1-2 minutes. It's in the substernal region and does not radiate. She rates it as about 6-7/10 and it is not associated with any nausea, vomiting, diaphoresis or shortness of breath. Shesaid in the morning she may awaken and then be anxious. This may trigger this discomfort. She may also notice it at rest. It is not exertional in nature. She feels that it seems to improve if she relaxes.    She's had some difficulty with life stress in the last couple of months. She notes some dyspnea on exertion but states that this is because she is holding her breath intermittently. She occasionally feels that she needs to take a deep breath. She exercises for at least an hour a day without difficulty. She is noted no PND, orthopnea or edema. Recently she's noted some episodes of palpitations with a fluttering in her chest. This lasts less than a minute. Is not associated with any lightheadedness.    Past Medical History:   Diagnosis Date   • Ankle fracture 1/18/2015    Unstable fracture 2014. Sees Ortho.   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 Hamilton   • Leg length discrepancy     • Lumbar radicular pain 12/15/2012   • Osteoarthritis 8/22/2011   • Osteopenia    • Palpitations    • Raynaud's phenomenon    • Seasonal allergies    • Tinnitus    • Varicose vein of leg      Past Surgical History:   Procedure Laterality Date   • ANKLE ORIF  1/6/15    [facility]   • KNEE ARTHROSCOPY  12/29/2011    Performed by BRADFORD WEBB at SURGERY [facility]   • MEDIAL MENISCECTOMY  12/29/2011    Performed by BRADFORD WEBB at SURGERY [facility]   • HYSTERECTOMY, VAGINAL  1985   • APPENDECTOMY  1980    was in [facility]   • LAPAROTOMY  1980    excision uterine fibroid   • HARDWARE REMOVAL ORTHO  1979    left ring finger   • HAND SURGERY  1978    ORIF left ring finger   • CATARACT EXTRACTION WITH IOL  2007, 2008    Bilateral   • TONSILLECTOMY AND ADENOIDECTOMY  as child     Family History   Problem Relation Age of Onset   • Heart Disease Father      d. MI age 56   • Genitourinary () Mother      CKD   • Heart Disease Brother 42     CABG x multi vessel   • Diabetes Brother      DM2     Social History     Social History   • Marital status:      Spouse name: N/A   • Number of children: N/A   • Years of education: N/A     Occupational History   • Not on file.     Social History Main Topics   • Smoking status: Never Smoker   • Smokeless tobacco: Never Used   • Alcohol use 2.4 oz/week     4 Glasses of wine per week      Comment: 2-4 per week   • Drug use: No   • Sexual activity: Yes     Partners: Male     Other Topics Concern   • Not on file     Social History Narrative    Swims, does weights, and golfs 4-5 x/week.     . Retired Dental Hygienist.      Allergies   Allergen Reactions   • Nabumetone      Neutropenia   • Tape      adheavise   • Tramadol Unspecified     Hard to move after taking     Outpatient Encounter Prescriptions as of 4/16/2018   Medication Sig Dispense Refill   • Cholecalciferol (VITAMIN D) 2000 UNIT Tab Take  by mouth every day.     • ranitidine (ZANTAC) 75 MG  "tablet Take 1 Tab by mouth every day. 90 Tab 1   • atorvastatin (LIPITOR) 10 MG Tab Take 2 Tabs by mouth every bedtime. 90 Tab 0   • valacyclovir (VALTREX) 1 GM Tab Take 0.5 tablet once daily 180 Tab 0   • acetaminophen (TYLENOL) 325 MG Tab Take 650 mg by mouth every four hours as needed.     • naproxen (NAPROSYN) 500 MG Tab TAKE 1 TABLET BY MOUTH 2 TIMES A DAY, WITH MEALS. 60 Tab 5   • cyanocobalamin (VITAMIN B-12) 500 MCG TABS Take 500 mcg by mouth every day.     • aspirin 81 MG tablet Take 81 mg by mouth every day.     • CALCIUM 500 + D PO Take  by mouth. 2 daily     • alendronate (FOSAMAX) 70 MG Tab TAKE 1 TABLET BY MOUTH EVERY 7 DAYS. TAKE ON AN EMPTY STOMACH. NO FOOD OR DRINK FOR 30 MIN 12 Tab 1   • PROAIR  (90 BASE) MCG/ACT AERS inhalation aerosol INHALE 2 PUFFS BY MOUTH EVERY FOUR HOURS AS NEEDED FOR SHORTNESS OF BREATH. 1 Inhaler 3   • Cholecalciferol (VITAMIN D) 400 UNIT TABS Take 2,000 Units by mouth every day.       No facility-administered encounter medications on file as of 4/16/2018.      Review of Systems   Constitutional: Negative for chills and fever.   HENT: Positive for hearing loss and tinnitus. Negative for congestion.    Eyes: Negative for blurred vision, pain and discharge.   Respiratory: Positive for cough and shortness of breath. Negative for hemoptysis and wheezing.    Cardiovascular: Positive for chest pain and palpitations.   Gastrointestinal: Positive for heartburn. Negative for abdominal pain, nausea and vomiting.   Musculoskeletal: Positive for joint pain. Negative for myalgias.   Skin: Negative for itching and rash.   Neurological: Negative for speech change and loss of consciousness.   Endo/Heme/Allergies: Positive for environmental allergies. Bruises/bleeds easily.   Psychiatric/Behavioral: Negative for depression. The patient is not nervous/anxious.    All other systems reviewed and are negative.       Objective:   /70   Pulse 85   Ht 1.588 m (5' 2.5\")   Wt 55.3 kg " (122 lb)   LMP 03/01/1986   SpO2 98%   BMI 21.96 kg/m²     Physical Exam   Constitutional: She is oriented to person, place, and time. She appears well-developed and well-nourished. No distress.   HENT:   Head: Normocephalic and atraumatic.   Mouth/Throat: Mucous membranes are normal.   Neck: No JVD present. No thyromegaly present.   Cardiovascular: Normal rate, regular rhythm and intact distal pulses.  Exam reveals no gallop.    No murmur heard.  Pulmonary/Chest: Effort normal and breath sounds normal. She has no rales.   Abdominal: Soft. There is no splenomegaly or hepatomegaly.   Musculoskeletal: Normal range of motion. She exhibits no edema.   Lymphadenopathy:     She has no cervical adenopathy.   Neurological: She is alert and oriented to person, place, and time. She has normal strength. She exhibits normal muscle tone.   Skin: Skin is warm and dry. No rash noted.   Psychiatric: She has a normal mood and affect. Her behavior is normal.     Lab Results   Component Value Date/Time    CHOLSTRLTOT 171 06/19/2017 07:01 AM    LDL 93 06/19/2017 07:01 AM    HDL 53 06/19/2017 07:01 AM    TRIGLYCERIDE 124 06/19/2017 07:01 AM       Lab Results   Component Value Date/Time    SODIUM 139 06/19/2017 07:01 AM    POTASSIUM 4.3 06/19/2017 07:01 AM    CHLORIDE 109 06/19/2017 07:01 AM    CO2 25 06/19/2017 07:01 AM    GLUCOSE 98 06/19/2017 07:01 AM    BUN 19 06/19/2017 07:01 AM    CREATININE 0.82 06/19/2017 07:01 AM    CREATININE 0.82 05/06/2013 07:59 AM    BUNCREATRAT 21 05/06/2013 07:59 AM     Lab Results   Component Value Date/Time    ALKPHOSPHAT 50 06/19/2017 07:01 AM    ASTSGOT 14 06/19/2017 07:01 AM    ALTSGPT 9 06/19/2017 07:01 AM    TBILIRUBIN 0.4 06/19/2017 07:01 AM      No results found for: BNPBTYPENAT       Echocardiogram:  CONCLUSIONS  Normal left ventricular size. Normal left ventricular wall thickness.   Abnormal (paradoxical) septal motion consistent with left bundle branch   block. Left ventricular ejection  fraction is 50% to 55%. Grade I   diastolic dysfunction - mitral inflow E/A is <1.0.   Mild mitral regurgitation.  Aortic sclerosis without stenosis. No aortic insufficiency.   Trace tricuspid regurgitation. Right ventricular systolic pressure is   estimated to be 12-17 mmHg.  Normal pericardium without effusion.  Normal aortic root diameter 2.5 cm.  No prior study for comparison.    KATERINA WASSERMAN  Exam Date:         09/23/2014       EKG: Shows a normal sinus rhythm with occasional PVCs and an incomplete left bundle-branch block pattern.    Assessment:     1. Chest discomfort     2. Dyslipidemia     3. LBBB (left bundle branch block)     4. Palpitations         Medical Decision Making:  Today's Assessment / Status / Plan:     Ms. Wasserman is having difficulty with atypical chest discomfort which is fairly brief. She exercises regularly without difficulty. She does have a strong family history of premature coronary artery disease. At this time, she'll be further evaluated with a cardiac CT scan for calcium scoring. She'll follow-up in about a week and we'll decide whether or not to proceed any further evaluation. She has had palpitations in the past which seem to be stress related. If these are progressive we'll consider further evaluation though they seem to be self-limited and she is not lightheaded with them. Obviously, if she has any coronary calcification we will need to be more aggressive with her lipid-lowering.    Given her mildly elevated blood pressure today and her palpitations, we will obtain an echocardiogram. Her last echo was in 2014.

## 2018-04-18 ENCOUNTER — HOSPITAL ENCOUNTER (OUTPATIENT)
Dept: RADIOLOGY | Facility: MEDICAL CENTER | Age: 73
End: 2018-04-18
Attending: INTERNAL MEDICINE
Payer: COMMERCIAL

## 2018-04-18 DIAGNOSIS — E78.5 DYSLIPIDEMIA: ICD-10-CM

## 2018-04-18 PROCEDURE — 4410556 CT-CARDIAC SCORING

## 2018-04-19 LAB — EKG IMPRESSION: NORMAL

## 2018-04-20 ENCOUNTER — HOSPITAL ENCOUNTER (OUTPATIENT)
Dept: CARDIOLOGY | Facility: MEDICAL CENTER | Age: 73
End: 2018-04-20
Attending: INTERNAL MEDICINE
Payer: MEDICARE

## 2018-04-20 DIAGNOSIS — R00.2 PALPITATIONS: ICD-10-CM

## 2018-04-20 DIAGNOSIS — I44.7 LBBB (LEFT BUNDLE BRANCH BLOCK): ICD-10-CM

## 2018-04-20 LAB
LV EJECT FRACT  99904: 65
LV EJECT FRACT MOD 2C 99903: 63.11
LV EJECT FRACT MOD 4C 99902: 66.28
LV EJECT FRACT MOD BP 99901: 66.54

## 2018-04-20 PROCEDURE — 93306 TTE W/DOPPLER COMPLETE: CPT

## 2018-04-25 DIAGNOSIS — E78.5 HYPERLIPIDEMIA WITH TARGET LDL LESS THAN 70: ICD-10-CM

## 2018-04-26 DIAGNOSIS — E78.5 HYPERLIPIDEMIA WITH TARGET LDL LESS THAN 70: ICD-10-CM

## 2018-04-26 RX ORDER — ATORVASTATIN CALCIUM 20 MG/1
20 TABLET, FILM COATED ORAL
Qty: 90 TAB | Refills: 1 | Status: SHIPPED | OUTPATIENT
Start: 2018-04-26 | End: 2018-04-26 | Stop reason: SDUPTHER

## 2018-04-26 RX ORDER — ATORVASTATIN CALCIUM 10 MG/1
10 TABLET, FILM COATED ORAL DAILY
Qty: 90 TAB | Refills: 1
Start: 2018-04-26 | End: 2018-05-01

## 2018-04-26 RX ORDER — ATORVASTATIN CALCIUM 10 MG/1
20 TABLET, FILM COATED ORAL
Qty: 90 TAB | Refills: 1 | Status: SHIPPED | OUTPATIENT
Start: 2018-04-26 | End: 2018-04-26 | Stop reason: SDUPTHER

## 2018-05-01 ENCOUNTER — OFFICE VISIT (OUTPATIENT)
Dept: CARDIOLOGY | Facility: MEDICAL CENTER | Age: 73
End: 2018-05-01
Payer: MEDICARE

## 2018-05-01 ENCOUNTER — HOSPITAL ENCOUNTER (OUTPATIENT)
Dept: LAB | Facility: MEDICAL CENTER | Age: 73
End: 2018-05-01
Attending: NURSE PRACTITIONER
Payer: MEDICARE

## 2018-05-01 VITALS
OXYGEN SATURATION: 96 % | DIASTOLIC BLOOD PRESSURE: 60 MMHG | WEIGHT: 123 LBS | HEART RATE: 88 BPM | HEIGHT: 63 IN | BODY MASS INDEX: 21.79 KG/M2 | SYSTOLIC BLOOD PRESSURE: 100 MMHG

## 2018-05-01 DIAGNOSIS — R00.2 PALPITATIONS: ICD-10-CM

## 2018-05-01 DIAGNOSIS — R07.89 CHEST DISCOMFORT: ICD-10-CM

## 2018-05-01 DIAGNOSIS — E78.5 DYSLIPIDEMIA: ICD-10-CM

## 2018-05-01 LAB — TSH SERPL DL<=0.005 MIU/L-ACNC: 2 UIU/ML (ref 0.38–5.33)

## 2018-05-01 PROCEDURE — 36415 COLL VENOUS BLD VENIPUNCTURE: CPT

## 2018-05-01 PROCEDURE — 99214 OFFICE O/P EST MOD 30 MIN: CPT | Performed by: NURSE PRACTITIONER

## 2018-05-01 PROCEDURE — 84436 ASSAY OF TOTAL THYROXINE: CPT

## 2018-05-01 PROCEDURE — 84443 ASSAY THYROID STIM HORMONE: CPT

## 2018-05-01 PROCEDURE — 84479 ASSAY OF THYROID (T3 OR T4): CPT

## 2018-05-01 RX ORDER — ATORVASTATIN CALCIUM 20 MG/1
20 TABLET, FILM COATED ORAL DAILY
Qty: 90 TAB | Refills: 3 | Status: SHIPPED | OUTPATIENT
Start: 2018-05-01 | End: 2019-06-18 | Stop reason: SDUPTHER

## 2018-05-01 RX ORDER — MULTIVITAMIN WITH IRON
TABLET ORAL
COMMUNITY
End: 2021-07-15

## 2018-05-01 ASSESSMENT — ENCOUNTER SYMPTOMS
COUGH: 0
BRUISES/BLEEDS EASILY: 0
PALPITATIONS: 0
FEVER: 0
NAUSEA: 0
MYALGIAS: 0
LOSS OF CONSCIOUSNESS: 0
ABDOMINAL PAIN: 0
CHILLS: 0
ORTHOPNEA: 0
SHORTNESS OF BREATH: 0
PND: 0
HEADACHES: 0
DIZZINESS: 0

## 2018-05-01 NOTE — PROGRESS NOTES
Chief Complaint   Patient presents with   • Follow-Up   • Chest Pain   • Hyperlipidemia   • Palpitations       Subjective:   Hermelinda Wasserman is a 72 y.o. female who presents today for two week follow-up of chest pain and palpitations.    Hermelinda is a 72 year old female with history of hyperlipidemia and LBBB, seen by Dr. Dunn two weeks ago for chest pain and palpitations. Coronary CT calcium scoring and echocardiogram were ordered.     She is here today for follow-up. She has not had any chest pain similar to previous symptoms that brought her in to see Dr. Dunn, but some mild discomfort. She is concerned about resting HR, running 80-90s. She does exercise daily for about an hour, and tolerates without any problems. No shortness of breath, orthopnea or PND; no dizziness or syncope; no edema. She drinks about 1-2 cups of coffee per day, and is a vegetarian.    Past Medical History:   Diagnosis Date   • Ankle fracture 1/18/2015    Unstable fracture 2014. Sees Ortho.   • Atrophic vaginitis    • CATARACT     Surgically corrected   • Chest pain 04/2018    Coronary CT calcium score of 52.3 (LAD and RCA). Echocardiogram with normal LV size, LVEF 65%. No valvular abnormalities.   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 Hamilton   • Leg length discrepancy    • Lumbar radicular pain 12/15/2012   • Osteoarthritis 8/22/2011   • Osteopenia    • Palpitations    • Raynaud's phenomenon    • Seasonal allergies    • Tinnitus    • Varicose vein of leg      Past Surgical History:   Procedure Laterality Date   • ANKLE ORIF  1/6/15    [facility]   • KNEE ARTHROSCOPY  12/29/2011    Performed by BRADFORD HAMILTON at [facility] ORS   • MEDIAL MENISCECTOMY   12/29/2011    Performed by BRADFORD WEBB at SURGERY [facility] ORS   • HYSTERECTOMY, VAGINAL  1985   • APPENDECTOMY  1980    was in [place]   • LAPAROTOMY  1980    excision uterine fibroid   • HARDWARE REMOVAL ORTHO  1979    left ring finger   • HAND SURGERY  1978    ORIF left ring finger   • CATARACT EXTRACTION WITH IOL  2007, 2008    Bilateral   • TONSILLECTOMY AND ADENOIDECTOMY  as child     Family History   Problem Relation Age of Onset   • Heart Disease Father      d. MI age 56   • Genitourinary () Mother      CKD   • Heart Disease Brother 42     CABG x multi vessel   • Diabetes Brother      DM2     Social History     Social History   • Marital status:      Spouse name: N/A   • Number of children: N/A   • Years of education: N/A     Occupational History   • Not on file.     Social History Main Topics   • Smoking status: Never Smoker   • Smokeless tobacco: Never Used   • Alcohol use 2.4 oz/week     4 Glasses of wine per week      Comment: 2-4 per week   • Drug use: No   • Sexual activity: Yes     Partners: Male     Other Topics Concern   • Not on file     Social History Narrative    Swims, does weights, and golfs 4-5 x/week.     . Retired Dental Hygienist.      Allergies   Allergen Reactions   • Nabumetone      Neutropenia   • Tape      adheavise   • Tramadol Unspecified     Hard to move after taking     Outpatient Encounter Prescriptions as of 5/1/2018   Medication Sig Dispense Refill   • Magnesium 250 MG Tab Take  by mouth.     • Naproxen Sod-Diphenhydramine (ALEVE PM PO) Take  by mouth.     • atorvastatin (LIPITOR) 20 MG Tab Take 1 Tab by mouth every day. 90 Tab 3   • Cholecalciferol (VITAMIN D) 2000 UNIT Tab Take  by mouth every day.     • ranitidine (ZANTAC) 75 MG tablet Take 1 Tab by mouth every day. 90 Tab 1   • alendronate (FOSAMAX) 70 MG Tab TAKE 1 TABLET BY MOUTH EVERY 7 DAYS. TAKE ON AN EMPTY STOMACH. NO FOOD OR DRINK FOR 30 MIN 12 Tab 1   • valacyclovir (VALTREX) 1 GM  "Tab Take 0.5 tablet once daily 180 Tab 0   • acetaminophen (TYLENOL) 325 MG Tab Take 650 mg by mouth every four hours as needed.     • cyanocobalamin (VITAMIN B-12) 500 MCG TABS Take 500 mcg by mouth every day.     • aspirin 81 MG tablet Take 81 mg by mouth every day.     • CALCIUM 500 + D PO Take  by mouth. 2 daily     • [DISCONTINUED] atorvastatin (LIPITOR) 10 MG Tab Take 1 Tab by mouth every day. 90 Tab 1   • naproxen (NAPROSYN) 500 MG Tab TAKE 1 TABLET BY MOUTH 2 TIMES A DAY, WITH MEALS. 60 Tab 5   • [DISCONTINUED] PROAIR  (90 BASE) MCG/ACT AERS inhalation aerosol INHALE 2 PUFFS BY MOUTH EVERY FOUR HOURS AS NEEDED FOR SHORTNESS OF BREATH. (Patient not taking: Reported on 5/1/2018) 1 Inhaler 3   • [DISCONTINUED] Cholecalciferol (VITAMIN D) 400 UNIT TABS Take 2,000 Units by mouth every day.       No facility-administered encounter medications on file as of 5/1/2018.      Review of Systems   Constitutional: Negative for chills and fever.   HENT: Negative for congestion.    Respiratory: Negative for cough and shortness of breath.    Cardiovascular: Negative for chest pain, palpitations, orthopnea, leg swelling and PND.   Gastrointestinal: Negative for abdominal pain and nausea.   Musculoskeletal: Negative for myalgias.   Skin: Negative for rash.   Neurological: Negative for dizziness, loss of consciousness and headaches.   Endo/Heme/Allergies: Does not bruise/bleed easily.        Objective:   /60   Pulse 88   Ht 1.588 m (5' 2.5\")   Wt 55.8 kg (123 lb)   LMP 03/01/1986   SpO2 96%   BMI 22.14 kg/m²     Physical Exam   Constitutional: She is oriented to person, place, and time. She appears well-developed and well-nourished.   Fit, looks younger than stated age.   HENT:   Head: Normocephalic.   Eyes: EOM are normal.   Neck: Normal range of motion. Neck supple. No JVD present.   Cardiovascular: Normal rate, regular rhythm and normal heart sounds.    Pulmonary/Chest: Effort normal and breath sounds " normal. No respiratory distress. She has no wheezes. She has no rales.   Abdominal: Soft. Bowel sounds are normal. She exhibits no distension. There is no tenderness.   Musculoskeletal: Normal range of motion. She exhibits no edema.   Neurological: She is alert and oriented to person, place, and time.   Skin: Skin is warm and dry. No rash noted.   Psychiatric: She has a normal mood and affect.     CONCLUSIONS OF ECHOCARDIOGRAM OF 4/20/2018 - REVIEWED WITH PATIENT:  Normal left ventricular size and systolic function. Normal regional   wall motion. Left ventricular ejection fraction is visually estimated   to be 65%. Normal left ventricular wall thickness. Indeterminate   Diastolic function.  Compared to the report of the study done 9/2014 - there has been no   significant change.     FINDINGS OF CORONARY CT CALCIUM SCORING OF 4/18/2018 - REVIEWED WITH PATIENT:  Coronary calcification:  LMA - 0.0  LCX - 0.0  LAD - 23.3  RCA - 29.3  PDA - 0.0  Calcium score:  52.3  The visualized portions of the lungs, mediastinum, and upper abdomen are within normal limits.  1.  There is definite, at least mild atherosclerotic plaque burden present.  2.  Minimal to mild coronary stenoses are likely.  3.  The patient's cardiovascular risk is moderate.  4. Aortic atherosclerotic plaque  Calcium score is at the 50th percentile for the patient's age and sex.    Lab Results   Component Value Date/Time    CHOLSTRLTOT 171 06/19/2017 07:01 AM    LDL 93 06/19/2017 07:01 AM    HDL 53 06/19/2017 07:01 AM    TRIGLYCERIDE 124 06/19/2017 07:01 AM       Lab Results   Component Value Date/Time    SODIUM 139 06/19/2017 07:01 AM    POTASSIUM 4.3 06/19/2017 07:01 AM    CHLORIDE 109 06/19/2017 07:01 AM    CO2 25 06/19/2017 07:01 AM    GLUCOSE 98 06/19/2017 07:01 AM    BUN 19 06/19/2017 07:01 AM    CREATININE 0.82 06/19/2017 07:01 AM    CREATININE 0.82 05/06/2013 07:59 AM    BUNCREATRAT 21 05/06/2013 07:59 AM     Lab Results   Component Value Date/Time     ALKPHOSPHAT 50 06/19/2017 07:01 AM    ASTSGOT 14 06/19/2017 07:01 AM    ALTSGPT 9 06/19/2017 07:01 AM    TBILIRUBIN 0.4 06/19/2017 07:01 AM        Assessment:     1. Chest discomfort     2. Dyslipidemia  atorvastatin (LIPITOR) 20 MG Tab   3. Palpitations  THYROID PANEL    TSH       Medical Decision Making:  Today's Assessment / Status / Plan:     1. Chest discomfort, mostly resolved. Reviewed echocardiogram and CT coronary calcium scoring with patient and her . Continue with diet and exercise lifestyle modifications. To also increase Lipitor from 10mg to 20mg, and repeat lipid panel in a month.    2. Hyperlipidemia, treated. As above, to increase dose from 10mg to 20mg.    3. Palpitations. To check thyroid panel and TSH today. If normal, consider holter monitor at follow-up.    Plan as above. FU in 6 weeks to evaluate labs. FU sooner if clinical condition changes.    Collaborating MD: KATERINA Keenan

## 2018-05-01 NOTE — LETTER
[facility]   [address]   [address]  [place] [address]  Phone: [phone]  Fax: [phone]              Hermelinda Wasserman  1945    Encounter Date: 5/1/2018    JENI Doss          PROGRESS NOTE:  Chief Complaint   Patient presents with   • Follow-Up   • Chest Pain   • Hyperlipidemia   • Palpitations       Subjective:   Hermelinda Wasserman is a 72 y.o. female who presents today for two week follow-up of chest pain and palpitations.    Hermelinda is a 72 year old female with history of hyperlipidemia and LBBB, seen by Dr. Dunn two weeks ago for chest pain and palpitations. Coronary CT calcium scoring and echocardiogram were ordered.     She is here today for follow-up. She has not had any chest pain similar to previous symptoms that brought her in to see Dr. Dunn, but some mild discomfort. She is concerned about resting HR, running 80-90s. She does exercise daily for about an hour, and tolerates without any problems. No shortness of breath, orthopnea or PND; no dizziness or syncope; no edema. She drinks about 1-2 cups of coffee per day, and is a vegetarian.    Past Medical History:   Diagnosis Date   • Ankle fracture 1/18/2015    Unstable fracture 2014. Sees Ortho.   • Atrophic vaginitis    • CATARACT     Surgically corrected   • Chest pain 04/2018    Coronary CT calcium score of 52.3 (LAD and RCA). Echocardiogram with normal LV size, LVEF 65%. No valvular abnormalities.   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 Hamilton   • Leg length discrepancy    • Lumbar radicular pain 12/15/2012   • Osteoarthritis 8/22/2011   • Osteopenia    • Palpitations    •  Raynaud's phenomenon    • Seasonal allergies    • Tinnitus    • Varicose vein of leg      Past Surgical History:   Procedure Laterality Date   • ANKLE ORIF  1/6/15    [facility]   • KNEE ARTHROSCOPY  12/29/2011    Performed by BRADFORD WEBB at SURGERY [facility] ORS   • MEDIAL MENISCECTOMY  12/29/2011    Performed by BRADFORD WEBB at SURGERY [facility] ORS   • HYSTERECTOMY, VAGINAL  1985   • APPENDECTOMY  1980    was in [place]   • LAPAROTOMY  1980    excision uterine fibroid   • HARDWARE REMOVAL ORTHO  1979    left ring finger   • HAND SURGERY  1978    ORIF left ring finger   • CATARACT EXTRACTION WITH IOL  2007, 2008    Bilateral   • TONSILLECTOMY AND ADENOIDECTOMY  as child     Family History   Problem Relation Age of Onset   • Heart Disease Father      d. MI age 56   • Genitourinary () Mother      CKD   • Heart Disease Brother 42     CABG x multi vessel   • Diabetes Brother      DM2     Social History     Social History   • Marital status:      Spouse name: N/A   • Number of children: N/A   • Years of education: N/A     Occupational History   • Not on file.     Social History Main Topics   • Smoking status: Never Smoker   • Smokeless tobacco: Never Used   • Alcohol use 2.4 oz/week     4 Glasses of wine per week      Comment: 2-4 per week   • Drug use: No   • Sexual activity: Yes     Partners: Male     Other Topics Concern   • Not on file     Social History Narrative    Swims, does weights, and golfs 4-5 x/week.     . Retired Dental Hygienist.      Allergies   Allergen Reactions   • Nabumetone      Neutropenia   • Tape      adheavise   • Tramadol Unspecified     Hard to move after taking     Outpatient Encounter Prescriptions as of 5/1/2018   Medication Sig Dispense Refill   • Magnesium 250 MG Tab Take  by mouth.     • Naproxen Sod-Diphenhydramine (ALEVE PM PO) Take  by mouth.     • atorvastatin (LIPITOR) 20 MG Tab Take 1 Tab by mouth every day. 90 Tab 3   • Cholecalciferol  "(VITAMIN D) 2000 UNIT Tab Take  by mouth every day.     • ranitidine (ZANTAC) 75 MG tablet Take 1 Tab by mouth every day. 90 Tab 1   • alendronate (FOSAMAX) 70 MG Tab TAKE 1 TABLET BY MOUTH EVERY 7 DAYS. TAKE ON AN EMPTY STOMACH. NO FOOD OR DRINK FOR 30 MIN 12 Tab 1   • valacyclovir (VALTREX) 1 GM Tab Take 0.5 tablet once daily 180 Tab 0   • acetaminophen (TYLENOL) 325 MG Tab Take 650 mg by mouth every four hours as needed.     • cyanocobalamin (VITAMIN B-12) 500 MCG TABS Take 500 mcg by mouth every day.     • aspirin 81 MG tablet Take 81 mg by mouth every day.     • CALCIUM 500 + D PO Take  by mouth. 2 daily     • [DISCONTINUED] atorvastatin (LIPITOR) 10 MG Tab Take 1 Tab by mouth every day. 90 Tab 1   • naproxen (NAPROSYN) 500 MG Tab TAKE 1 TABLET BY MOUTH 2 TIMES A DAY, WITH MEALS. 60 Tab 5   • [DISCONTINUED] PROAIR  (90 BASE) MCG/ACT AERS inhalation aerosol INHALE 2 PUFFS BY MOUTH EVERY FOUR HOURS AS NEEDED FOR SHORTNESS OF BREATH. (Patient not taking: Reported on 5/1/2018) 1 Inhaler 3   • [DISCONTINUED] Cholecalciferol (VITAMIN D) 400 UNIT TABS Take 2,000 Units by mouth every day.       No facility-administered encounter medications on file as of 5/1/2018.      Review of Systems   Constitutional: Negative for chills and fever.   HENT: Negative for congestion.    Respiratory: Negative for cough and shortness of breath.    Cardiovascular: Negative for chest pain, palpitations, orthopnea, leg swelling and PND.   Gastrointestinal: Negative for abdominal pain and nausea.   Musculoskeletal: Negative for myalgias.   Skin: Negative for rash.   Neurological: Negative for dizziness, loss of consciousness and headaches.   Endo/Heme/Allergies: Does not bruise/bleed easily.        Objective:   /60   Pulse 88   Ht 1.588 m (5' 2.5\")   Wt 55.8 kg (123 lb)   LMP 03/01/1986   SpO2 96%   BMI 22.14 kg/m²      Physical Exam   Constitutional: She is oriented to person, place, and time. She appears well-developed " and well-nourished.   Fit, looks younger than stated age.   HENT:   Head: Normocephalic.   Eyes: EOM are normal.   Neck: Normal range of motion. Neck supple. No JVD present.   Cardiovascular: Normal rate, regular rhythm and normal heart sounds.    Pulmonary/Chest: Effort normal and breath sounds normal. No respiratory distress. She has no wheezes. She has no rales.   Abdominal: Soft. Bowel sounds are normal. She exhibits no distension. There is no tenderness.   Musculoskeletal: Normal range of motion. She exhibits no edema.   Neurological: She is alert and oriented to person, place, and time.   Skin: Skin is warm and dry. No rash noted.   Psychiatric: She has a normal mood and affect.     CONCLUSIONS OF ECHOCARDIOGRAM OF 4/20/2018 - REVIEWED WITH PATIENT:  Normal left ventricular size and systolic function. Normal regional   wall motion. Left ventricular ejection fraction is visually estimated   to be 65%. Normal left ventricular wall thickness. Indeterminate   Diastolic function.  Compared to the report of the study done 9/2014 - there has been no   significant change.     FINDINGS OF CORONARY CT CALCIUM SCORING OF 4/18/2018 - REVIEWED WITH PATIENT:  Coronary calcification:  LMA - 0.0  LCX - 0.0  LAD - 23.3  RCA - 29.3  PDA - 0.0  Calcium score:  52.3  The visualized portions of the lungs, mediastinum, and upper abdomen are within normal limits.  1.  There is definite, at least mild atherosclerotic plaque burden present.  2.  Minimal to mild coronary stenoses are likely.  3.  The patient's cardiovascular risk is moderate.  4. Aortic atherosclerotic plaque  Calcium score is at the 50th percentile for the patient's age and sex.    Lab Results   Component Value Date/Time    CHOLSTRLTOT 171 06/19/2017 07:01 AM    LDL 93 06/19/2017 07:01 AM    HDL 53 06/19/2017 07:01 AM    TRIGLYCERIDE 124 06/19/2017 07:01 AM       Lab Results   Component Value Date/Time    SODIUM 139 06/19/2017 07:01 AM    POTASSIUM 4.3 06/19/2017  07:01 AM    CHLORIDE 109 06/19/2017 07:01 AM    CO2 25 06/19/2017 07:01 AM    GLUCOSE 98 06/19/2017 07:01 AM    BUN 19 06/19/2017 07:01 AM    CREATININE 0.82 06/19/2017 07:01 AM    CREATININE 0.82 05/06/2013 07:59 AM    BUNCREATRAT 21 05/06/2013 07:59 AM     Lab Results   Component Value Date/Time    ALKPHOSPHAT 50 06/19/2017 07:01 AM    ASTSGOT 14 06/19/2017 07:01 AM    ALTSGPT 9 06/19/2017 07:01 AM    TBILIRUBIN 0.4 06/19/2017 07:01 AM        Assessment:     1. Chest discomfort     2. Dyslipidemia  atorvastatin (LIPITOR) 20 MG Tab   3. Palpitations  THYROID PANEL    TSH       Medical Decision Making:  Today's Assessment / Status / Plan:     1. Chest discomfort, mostly resolved. Reviewed echocardiogram and CT coronary calcium scoring with patient and her . Continue with diet and exercise lifestyle modifications. To also increase Lipitor from 10mg to 20mg, and repeat lipid panel in a month.    2. Hyperlipidemia, treated. As above, to increase dose from 10mg to 20mg.    3. Palpitations. To check thyroid panel and TSH today. If normal, consider holter monitor at follow-up.    Plan as above. FU in 6 weeks to evaluate labs. FU sooner if clinical condition changes.    Collaborating MD: KATERINA Keenan      No Recipients

## 2018-05-03 LAB
FT4I SERPL CALC-MCNC: 2.1 UNITS (ref 1.7–4.2)
T3RU NFR SERPL: 35 % (ref 28–41)
T4 SERPL-MCNC: 5.88 UG/DL (ref 5.1–14.1)

## 2018-05-08 ENCOUNTER — TELEPHONE (OUTPATIENT)
Dept: CARDIOLOGY | Facility: MEDICAL CENTER | Age: 73
End: 2018-05-08

## 2018-05-08 NOTE — TELEPHONE ENCOUNTER
Pt. is out of town right now. On the road in [place]. Seems to have less palpitations. Main concern is steady faster HR consistently in 80's. She will think about doing HM & let us know when gets back to town. Will keep 6/12 FV with Vandana.       Message   Received: Yesterday   Message Contents   JENI Doss R.N.             Thyroid studies are completely normal. Is she still having palpitations?   If so, would suggest holter monitor.   Thanks, AB

## 2018-06-05 ENCOUNTER — HOSPITAL ENCOUNTER (OUTPATIENT)
Dept: LAB | Facility: MEDICAL CENTER | Age: 73
End: 2018-06-05
Attending: FAMILY MEDICINE
Payer: MEDICARE

## 2018-06-05 DIAGNOSIS — E55.9 VITAMIN D INSUFFICIENCY: ICD-10-CM

## 2018-06-05 DIAGNOSIS — E78.5 HYPERLIPIDEMIA WITH TARGET LDL LESS THAN 70: ICD-10-CM

## 2018-06-05 DIAGNOSIS — I44.7 LBBB (LEFT BUNDLE BRANCH BLOCK): ICD-10-CM

## 2018-06-05 LAB
25(OH)D3 SERPL-MCNC: 34 NG/ML (ref 30–100)
ALBUMIN SERPL BCP-MCNC: 3.8 G/DL (ref 3.2–4.9)
ALBUMIN/GLOB SERPL: 1.3 G/DL
ALP SERPL-CCNC: 44 U/L (ref 30–99)
ALT SERPL-CCNC: 6 U/L (ref 2–50)
ANION GAP SERPL CALC-SCNC: 6 MMOL/L (ref 0–11.9)
AST SERPL-CCNC: 11 U/L (ref 12–45)
BASOPHILS # BLD AUTO: 0.9 % (ref 0–1.8)
BASOPHILS # BLD: 0.05 K/UL (ref 0–0.12)
BILIRUB SERPL-MCNC: 0.5 MG/DL (ref 0.1–1.5)
BUN SERPL-MCNC: 22 MG/DL (ref 8–22)
CALCIUM SERPL-MCNC: 9.1 MG/DL (ref 8.5–10.5)
CHLORIDE SERPL-SCNC: 103 MMOL/L (ref 96–112)
CHOLEST SERPL-MCNC: 164 MG/DL (ref 100–199)
CO2 SERPL-SCNC: 27 MMOL/L (ref 20–33)
CREAT SERPL-MCNC: 0.69 MG/DL (ref 0.5–1.4)
EOSINOPHIL # BLD AUTO: 0.23 K/UL (ref 0–0.51)
EOSINOPHIL NFR BLD: 4.3 % (ref 0–6.9)
ERYTHROCYTE [DISTWIDTH] IN BLOOD BY AUTOMATED COUNT: 48.3 FL (ref 35.9–50)
GLOBULIN SER CALC-MCNC: 2.9 G/DL (ref 1.9–3.5)
GLUCOSE SERPL-MCNC: 80 MG/DL (ref 65–99)
HCT VFR BLD AUTO: 40.4 % (ref 37–47)
HDLC SERPL-MCNC: 48 MG/DL
HGB BLD-MCNC: 13 G/DL (ref 12–16)
IMM GRANULOCYTES # BLD AUTO: 0.01 K/UL (ref 0–0.11)
IMM GRANULOCYTES NFR BLD AUTO: 0.2 % (ref 0–0.9)
LDLC SERPL CALC-MCNC: 79 MG/DL
LYMPHOCYTES # BLD AUTO: 2.23 K/UL (ref 1–4.8)
LYMPHOCYTES NFR BLD: 42.1 % (ref 22–41)
MCH RBC QN AUTO: 32.5 PG (ref 27–33)
MCHC RBC AUTO-ENTMCNC: 32.2 G/DL (ref 33.6–35)
MCV RBC AUTO: 101 FL (ref 81.4–97.8)
MONOCYTES # BLD AUTO: 0.52 K/UL (ref 0–0.85)
MONOCYTES NFR BLD AUTO: 9.8 % (ref 0–13.4)
NEUTROPHILS # BLD AUTO: 2.26 K/UL (ref 2–7.15)
NEUTROPHILS NFR BLD: 42.7 % (ref 44–72)
NRBC # BLD AUTO: 0 K/UL
NRBC BLD-RTO: 0 /100 WBC
PLATELET # BLD AUTO: 285 K/UL (ref 164–446)
PMV BLD AUTO: 10.4 FL (ref 9–12.9)
POTASSIUM SERPL-SCNC: 4.4 MMOL/L (ref 3.6–5.5)
PROT SERPL-MCNC: 6.7 G/DL (ref 6–8.2)
RBC # BLD AUTO: 4 M/UL (ref 4.2–5.4)
SODIUM SERPL-SCNC: 136 MMOL/L (ref 135–145)
TRIGL SERPL-MCNC: 186 MG/DL (ref 0–149)
WBC # BLD AUTO: 5.3 K/UL (ref 4.8–10.8)

## 2018-06-05 PROCEDURE — 80061 LIPID PANEL: CPT

## 2018-06-05 PROCEDURE — 36415 COLL VENOUS BLD VENIPUNCTURE: CPT

## 2018-06-05 PROCEDURE — 80053 COMPREHEN METABOLIC PANEL: CPT

## 2018-06-05 PROCEDURE — 82306 VITAMIN D 25 HYDROXY: CPT

## 2018-06-05 PROCEDURE — 85025 COMPLETE CBC W/AUTO DIFF WBC: CPT

## 2018-06-12 ENCOUNTER — OFFICE VISIT (OUTPATIENT)
Dept: CARDIOLOGY | Facility: MEDICAL CENTER | Age: 73
End: 2018-06-12
Payer: MEDICARE

## 2018-06-12 VITALS
SYSTOLIC BLOOD PRESSURE: 112 MMHG | HEART RATE: 84 BPM | DIASTOLIC BLOOD PRESSURE: 70 MMHG | WEIGHT: 119 LBS | BODY MASS INDEX: 21.09 KG/M2 | HEIGHT: 63 IN

## 2018-06-12 DIAGNOSIS — R00.2 PALPITATIONS: ICD-10-CM

## 2018-06-12 DIAGNOSIS — E78.5 DYSLIPIDEMIA: ICD-10-CM

## 2018-06-12 DIAGNOSIS — R07.89 CHEST DISCOMFORT: ICD-10-CM

## 2018-06-12 PROCEDURE — 99214 OFFICE O/P EST MOD 30 MIN: CPT | Performed by: NURSE PRACTITIONER

## 2018-06-12 ASSESSMENT — ENCOUNTER SYMPTOMS
COUGH: 0
ABDOMINAL PAIN: 0
DIZZINESS: 0
LOSS OF CONSCIOUSNESS: 0
MYALGIAS: 0
FEVER: 0
SHORTNESS OF BREATH: 0
NAUSEA: 0
BRUISES/BLEEDS EASILY: 0
CHILLS: 0
PND: 0
ORTHOPNEA: 0
PALPITATIONS: 0
HEADACHES: 0

## 2018-06-12 NOTE — LETTER
[facility]   [address]   [address]  [place] [address]  Phone: [phone]  Fax: [phone]              Hermelinda Wasserman  1945    Encounter Date: 6/12/2018    JENI Doss          PROGRESS NOTE:  Chief Complaint   Patient presents with   • Follow-Up   • Chest Pain   • Hyperlipidemia       Subjective:   Hermelinda Wasserman is a 72 y.o. female who presents today for one month follow-up of chest pain and medication change evaluation.    Hermelinda is a 72 year old female with history of hyperlipidemia, palpitations, chest pain and LBBB, previous seen by Dr. Dunn. Coronary CT calcium scoring showed minimal-mild coronary plaque, and echocardiogram was ordered. Lipitor was increased from 10mg to 20mg.     She is here today for follow-up. She is tolerating this dose of Lipitor without any problems. Her symptoms of chest pain have resolved completely. She was having some stress at home, which has since resolved; her brother was also in the hospital in [facility], and he is now out. Still occasional, nonsustained palpitations, but less and less frequent. She continues to exercise daily for about an hour, and tolerates without any problems. No shortness of breath, orthopnea or PND; no dizziness or syncope; no edema. She has a vegetarian diet.    Past Medical History:   Diagnosis Date   • Ankle fracture 1/18/2015    Unstable fracture 2014. Sees Ortho.   • Atrophic vaginitis    • CATARACT     Surgically corrected   • Chest pain 04/2018    Coronary CT calcium score of 52.3 (LAD and RCA). Echocardiogram with normal LV size, LVEF 65%. No valvular abnormalities.   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 Hamilton   • Leg length discrepancy    • Lumbar radicular pain 12/15/2012   • Osteoarthritis 8/22/2011   • Osteopenia    • Palpitations    • Raynaud's phenomenon    • Seasonal allergies    • Tinnitus    • Varicose vein of leg      Past Surgical History:   Procedure Laterality Date   • ANKLE ORIF  1/6/15    [facility]   • KNEE ARTHROSCOPY  12/29/2011    Performed by BRADFORD HAMILTON at SURGERY [facility] ORS   • MEDIAL MENISCECTOMY  12/29/2011    Performed by BRADFORD HAMILTON at SURGERY [facility] ORS   • HYSTERECTOMY, VAGINAL  1985   • APPENDECTOMY  1980    was in [place]   • LAPAROTOMY  1980    excision uterine fibroid   • HARDWARE REMOVAL ORTHO  1979    left ring finger   • HAND SURGERY  1978    ORIF left ring finger   • CATARACT EXTRACTION WITH IOL  2007, 2008    Bilateral   • TONSILLECTOMY AND ADENOIDECTOMY  as child     Family History   Problem Relation Age of Onset   • Heart Disease Father      d. MI age 56   • Genitourinary () Mother      CKD   • Heart Disease Brother 42     CABG x multi vessel   • Diabetes Brother      DM2     Social History     Social History   • Marital status:      Spouse name: N/A   • Number of children: N/A   • Years of education: N/A     Occupational History   • Not on file.     Social History Main Topics   • Smoking status: Never Smoker   • Smokeless tobacco: Never Used   • Alcohol use 2.4 oz/week     4 Glasses of wine per week      Comment: 2-4 per week   • Drug use: No   • Sexual activity: Yes     Partners: Male     Other Topics Concern   • Not on file     Social History Narrative    Swims, does weights, and golfs 4-5 x/week.     . Retired Dental Hygienist.      Allergies   Allergen Reactions   • Nabumetone      Neutropenia   • Tape      adheavise   • Tramadol Unspecified     Hard to move after taking     Outpatient Encounter Prescriptions as of 6/12/2018   Medication Sig Dispense Refill   • Magnesium 250 MG Tab Take   "by mouth.     • Naproxen Sod-Diphenhydramine (ALEVE PM PO) Take  by mouth.     • atorvastatin (LIPITOR) 20 MG Tab Take 1 Tab by mouth every day. 90 Tab 3   • Cholecalciferol (VITAMIN D) 2000 UNIT Tab Take  by mouth every day.     • ranitidine (ZANTAC) 75 MG tablet Take 1 Tab by mouth every day. 90 Tab 1   • alendronate (FOSAMAX) 70 MG Tab TAKE 1 TABLET BY MOUTH EVERY 7 DAYS. TAKE ON AN EMPTY STOMACH. NO FOOD OR DRINK FOR 30 MIN 12 Tab 1   • valacyclovir (VALTREX) 1 GM Tab Take 0.5 tablet once daily 180 Tab 0   • acetaminophen (TYLENOL) 325 MG Tab Take 650 mg by mouth every four hours as needed.     • naproxen (NAPROSYN) 500 MG Tab TAKE 1 TABLET BY MOUTH 2 TIMES A DAY, WITH MEALS. 60 Tab 5   • cyanocobalamin (VITAMIN B-12) 500 MCG TABS Take 500 mcg by mouth every day.     • aspirin 81 MG tablet Take 81 mg by mouth every day.     • CALCIUM 500 + D PO Take  by mouth. 2 daily       No facility-administered encounter medications on file as of 6/12/2018.      Review of Systems   Constitutional: Negative for chills and fever.   HENT: Negative for congestion.    Respiratory: Negative for cough and shortness of breath.    Cardiovascular: Negative for chest pain, palpitations, orthopnea, leg swelling and PND.   Gastrointestinal: Negative for abdominal pain and nausea.   Musculoskeletal: Negative for myalgias.   Skin: Negative for rash.   Neurological: Negative for dizziness, loss of consciousness and headaches.   Endo/Heme/Allergies: Does not bruise/bleed easily.        Objective:   /70   Pulse 84   Ht 1.588 m (5' 2.5\")   Wt 54 kg (119 lb)   LMP 03/01/1986   BMI 21.42 kg/m²      Physical Exam   Constitutional: She is oriented to person, place, and time. She appears well-developed and well-nourished.   Fit, looks younger than stated age.   HENT:   Head: Normocephalic.   Eyes: EOM are normal.   Neck: Normal range of motion. Neck supple. No JVD present.   Cardiovascular: Normal rate, regular rhythm and normal heart " sounds.    Pulmonary/Chest: Effort normal and breath sounds normal. No respiratory distress. She has no wheezes. She has no rales.   Abdominal: Soft. Bowel sounds are normal. She exhibits no distension. There is no tenderness.   Musculoskeletal: Normal range of motion. She exhibits no edema.   Neurological: She is alert and oriented to person, place, and time.   Skin: Skin is warm and dry. No rash noted.   Psychiatric: She has a normal mood and affect.     Lab Results   Component Value Date/Time    CHOLSTRLTOT 164 06/05/2018 06:41 AM    LDL 79 06/05/2018 06:41 AM    HDL 48 06/05/2018 06:41 AM    TRIGLYCERIDE 186 (H) 06/05/2018 06:41 AM       Lab Results   Component Value Date/Time    SODIUM 136 06/05/2018 06:41 AM    POTASSIUM 4.4 06/05/2018 06:41 AM    CHLORIDE 103 06/05/2018 06:41 AM    CO2 27 06/05/2018 06:41 AM    GLUCOSE 80 06/05/2018 06:41 AM    BUN 22 06/05/2018 06:41 AM    CREATININE 0.69 06/05/2018 06:41 AM    CREATININE 0.82 05/06/2013 07:59 AM    BUNCREATRAT 21 05/06/2013 07:59 AM     Lab Results   Component Value Date/Time    ALKPHOSPHAT 44 06/05/2018 06:41 AM    ASTSGOT 11 (L) 06/05/2018 06:41 AM    ALTSGPT 6 06/05/2018 06:41 AM    TBILIRUBIN 0.5 06/05/2018 06:41 AM      FINDINGS OF CORONARY CT CALCIUM SCORING OF 4/18/2018 - REVIEWED WITH PATIENT:  Coronary calcification:  LMA - 0.0  LCX - 0.0  LAD - 23.3  RCA - 29.3  PDA - 0.0  Calcium score:  52.3  The visualized portions of the lungs, mediastinum, and upper abdomen are within normal limits.  1.  There is definite, at least mild atherosclerotic plaque burden present.  2.  Minimal to mild coronary stenoses are likely.  3.  The patient's cardiovascular risk is moderate.  4. Aortic atherosclerotic plaque  Calcium score is at the 50th percentile for the patient's age and sex.    Assessment:     1. Chest discomfort     2. Dyslipidemia     3. Palpitations         Medical Decision Making:  Today's Assessment / Status / Plan:     1. Chest discomfort, now  resolved, due to resolution of stress.    2. Hyperlipidemia, treated with Lipitor. Recent lipid panel was good; she is tolerating without problems.    3. Palpitations, rare, nonsustained. She does take OTC magnesium. Does not want to add an Rx medications at this time.    Same medications for now. Continue with excellent diet and lifestyle. Follow-up in 1 year, sooner if clinical condition changes.    Collaborating MD: Jacinto Gilbert

## 2018-06-12 NOTE — PROGRESS NOTES
Chief Complaint   Patient presents with   • Follow-Up   • Chest Pain   • Hyperlipidemia       Subjective:   Hermelinda Wassemran is a 72 y.o. female who presents today for one month follow-up of chest pain and medication change evaluation.    Hermelinda is a 72 year old female with history of hyperlipidemia, palpitations, chest pain and LBBB, previous seen by Dr. Dunn. Coronary CT calcium scoring showed minimal-mild coronary plaque, and echocardiogram was ordered. Lipitor was increased from 10mg to 20mg.     She is here today for follow-up. She is tolerating this dose of Lipitor without any problems. Her symptoms of chest pain have resolved completely. She was having some stress at home, which has since resolved; her brother was also in the hospital in [facility], and he is now out. Still occasional, nonsustained palpitations, but less and less frequent. She continues to exercise daily for about an hour, and tolerates without any problems. No shortness of breath, orthopnea or PND; no dizziness or syncope; no edema. She has a vegetarian diet.    Past Medical History:   Diagnosis Date   • Ankle fracture 1/18/2015    Unstable fracture 2014. Sees Ortho.   • Atrophic vaginitis    • CATARACT     Surgically corrected   • Chest pain 04/2018    Coronary CT calcium score of 52.3 (LAD and RCA). Echocardiogram with normal LV size, LVEF 65%. No valvular abnormalities.   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 Hamilton   • Leg length discrepancy    • Lumbar radicular pain 12/15/2012   • Osteoarthritis 8/22/2011   • Osteopenia    • Palpitations    • Raynaud's phenomenon    • Seasonal allergies    • Tinnitus    • Varicose vein of leg      Past Surgical History:    Procedure Laterality Date   • ANKLE ORIF  1/6/15    [facility]   • KNEE ARTHROSCOPY  12/29/2011    Performed by BRADFORD WEBB at SURGERY [facility] ORS   • MEDIAL MENISCECTOMY  12/29/2011    Performed by BRADFORD WEBB at SURGERY [facility] ORS   • HYSTERECTOMY, VAGINAL  1985   • APPENDECTOMY  1980    was in [place]   • LAPAROTOMY  1980    excision uterine fibroid   • HARDWARE REMOVAL ORTHO  1979    left ring finger   • HAND SURGERY  1978    ORIF left ring finger   • CATARACT EXTRACTION WITH IOL  2007, 2008    Bilateral   • TONSILLECTOMY AND ADENOIDECTOMY  as child     Family History   Problem Relation Age of Onset   • Heart Disease Father      d. MI age 56   • Genitourinary () Mother      CKD   • Heart Disease Brother 42     CABG x multi vessel   • Diabetes Brother      DM2     Social History     Social History   • Marital status:      Spouse name: N/A   • Number of children: N/A   • Years of education: N/A     Occupational History   • Not on file.     Social History Main Topics   • Smoking status: Never Smoker   • Smokeless tobacco: Never Used   • Alcohol use 2.4 oz/week     4 Glasses of wine per week      Comment: 2-4 per week   • Drug use: No   • Sexual activity: Yes     Partners: Male     Other Topics Concern   • Not on file     Social History Narrative    Swims, does weights, and golfs 4-5 x/week.     . Retired Dental Hygienist.      Allergies   Allergen Reactions   • Nabumetone      Neutropenia   • Tape      adheavise   • Tramadol Unspecified     Hard to move after taking     Outpatient Encounter Prescriptions as of 6/12/2018   Medication Sig Dispense Refill   • Magnesium 250 MG Tab Take  by mouth.     • Naproxen Sod-Diphenhydramine (ALEVE PM PO) Take  by mouth.     • atorvastatin (LIPITOR) 20 MG Tab Take 1 Tab by mouth every day. 90 Tab 3   • Cholecalciferol (VITAMIN D) 2000 UNIT Tab Take  by mouth every day.     • ranitidine (ZANTAC) 75 MG tablet Take 1 Tab by mouth every  "day. 90 Tab 1   • alendronate (FOSAMAX) 70 MG Tab TAKE 1 TABLET BY MOUTH EVERY 7 DAYS. TAKE ON AN EMPTY STOMACH. NO FOOD OR DRINK FOR 30 MIN 12 Tab 1   • valacyclovir (VALTREX) 1 GM Tab Take 0.5 tablet once daily 180 Tab 0   • acetaminophen (TYLENOL) 325 MG Tab Take 650 mg by mouth every four hours as needed.     • naproxen (NAPROSYN) 500 MG Tab TAKE 1 TABLET BY MOUTH 2 TIMES A DAY, WITH MEALS. 60 Tab 5   • cyanocobalamin (VITAMIN B-12) 500 MCG TABS Take 500 mcg by mouth every day.     • aspirin 81 MG tablet Take 81 mg by mouth every day.     • CALCIUM 500 + D PO Take  by mouth. 2 daily       No facility-administered encounter medications on file as of 6/12/2018.      Review of Systems   Constitutional: Negative for chills and fever.   HENT: Negative for congestion.    Respiratory: Negative for cough and shortness of breath.    Cardiovascular: Negative for chest pain, palpitations, orthopnea, leg swelling and PND.   Gastrointestinal: Negative for abdominal pain and nausea.   Musculoskeletal: Negative for myalgias.   Skin: Negative for rash.   Neurological: Negative for dizziness, loss of consciousness and headaches.   Endo/Heme/Allergies: Does not bruise/bleed easily.        Objective:   /70   Pulse 84   Ht 1.588 m (5' 2.5\")   Wt 54 kg (119 lb)   LMP 03/01/1986   BMI 21.42 kg/m²     Physical Exam   Constitutional: She is oriented to person, place, and time. She appears well-developed and well-nourished.   Fit, looks younger than stated age.   HENT:   Head: Normocephalic.   Eyes: EOM are normal.   Neck: Normal range of motion. Neck supple. No JVD present.   Cardiovascular: Normal rate, regular rhythm and normal heart sounds.    Pulmonary/Chest: Effort normal and breath sounds normal. No respiratory distress. She has no wheezes. She has no rales.   Abdominal: Soft. Bowel sounds are normal. She exhibits no distension. There is no tenderness.   Musculoskeletal: Normal range of motion. She exhibits no edema. "   Neurological: She is alert and oriented to person, place, and time.   Skin: Skin is warm and dry. No rash noted.   Psychiatric: She has a normal mood and affect.     Lab Results   Component Value Date/Time    CHOLSTRLTOT 164 06/05/2018 06:41 AM    LDL 79 06/05/2018 06:41 AM    HDL 48 06/05/2018 06:41 AM    TRIGLYCERIDE 186 (H) 06/05/2018 06:41 AM       Lab Results   Component Value Date/Time    SODIUM 136 06/05/2018 06:41 AM    POTASSIUM 4.4 06/05/2018 06:41 AM    CHLORIDE 103 06/05/2018 06:41 AM    CO2 27 06/05/2018 06:41 AM    GLUCOSE 80 06/05/2018 06:41 AM    BUN 22 06/05/2018 06:41 AM    CREATININE 0.69 06/05/2018 06:41 AM    CREATININE 0.82 05/06/2013 07:59 AM    BUNCREATRAT 21 05/06/2013 07:59 AM     Lab Results   Component Value Date/Time    ALKPHOSPHAT 44 06/05/2018 06:41 AM    ASTSGOT 11 (L) 06/05/2018 06:41 AM    ALTSGPT 6 06/05/2018 06:41 AM    TBILIRUBIN 0.5 06/05/2018 06:41 AM      FINDINGS OF CORONARY CT CALCIUM SCORING OF 4/18/2018 - REVIEWED WITH PATIENT:  Coronary calcification:  LMA - 0.0  LCX - 0.0  LAD - 23.3  RCA - 29.3  PDA - 0.0  Calcium score:  52.3  The visualized portions of the lungs, mediastinum, and upper abdomen are within normal limits.  1.  There is definite, at least mild atherosclerotic plaque burden present.  2.  Minimal to mild coronary stenoses are likely.  3.  The patient's cardiovascular risk is moderate.  4. Aortic atherosclerotic plaque  Calcium score is at the 50th percentile for the patient's age and sex.    Assessment:     1. Chest discomfort     2. Dyslipidemia     3. Palpitations         Medical Decision Making:  Today's Assessment / Status / Plan:     1. Chest discomfort, now resolved, due to resolution of stress.    2. Hyperlipidemia, treated with Lipitor. Recent lipid panel was good; she is tolerating without problems.    3. Palpitations, rare, nonsustained. She does take OTC magnesium. Does not want to add an Rx medications at this time.    Same medications for  now. Continue with excellent diet and lifestyle. Follow-up in 1 year, sooner if clinical condition changes.    Collaborating MD: Jacinto

## 2018-07-09 ENCOUNTER — OFFICE VISIT (OUTPATIENT)
Dept: MEDICAL GROUP | Age: 73
End: 2018-07-09
Payer: MEDICARE

## 2018-07-09 VITALS
TEMPERATURE: 96.4 F | HEIGHT: 63 IN | SYSTOLIC BLOOD PRESSURE: 112 MMHG | BODY MASS INDEX: 21.72 KG/M2 | WEIGHT: 122.6 LBS | DIASTOLIC BLOOD PRESSURE: 64 MMHG | HEART RATE: 75 BPM | OXYGEN SATURATION: 98 % | RESPIRATION RATE: 12 BRPM

## 2018-07-09 DIAGNOSIS — R00.2 PALPITATIONS: ICD-10-CM

## 2018-07-09 DIAGNOSIS — M85.80 OSTEOPENIA WITH HIGH RISK OF FRACTURE: ICD-10-CM

## 2018-07-09 DIAGNOSIS — I73.00 RAYNAUD'S PHENOMENON WITHOUT GANGRENE: ICD-10-CM

## 2018-07-09 DIAGNOSIS — Z00.00 MEDICARE ANNUAL WELLNESS VISIT, SUBSEQUENT: ICD-10-CM

## 2018-07-09 DIAGNOSIS — E55.9 VITAMIN D INSUFFICIENCY: ICD-10-CM

## 2018-07-09 DIAGNOSIS — K21.9 GASTROESOPHAGEAL REFLUX DISEASE WITHOUT ESOPHAGITIS: ICD-10-CM

## 2018-07-09 DIAGNOSIS — M15.9 GENERALIZED OSTEOARTHRITIS: ICD-10-CM

## 2018-07-09 DIAGNOSIS — A60.04 HERPES SIMPLEX VULVOVAGINITIS: ICD-10-CM

## 2018-07-09 DIAGNOSIS — E78.00 PURE HYPERCHOLESTEROLEMIA: ICD-10-CM

## 2018-07-09 PROBLEM — R07.89 CHEST DISCOMFORT: Status: RESOLVED | Noted: 2018-04-16 | Resolved: 2018-07-09

## 2018-07-09 PROCEDURE — G0439 PPPS, SUBSEQ VISIT: HCPCS | Performed by: FAMILY MEDICINE

## 2018-07-09 RX ORDER — NAPROXEN 500 MG/1
500 TABLET ORAL
Qty: 90 TAB | Refills: 0 | Status: SHIPPED | OUTPATIENT
Start: 2018-07-09 | End: 2019-06-18

## 2018-07-09 RX ORDER — VALACYCLOVIR HYDROCHLORIDE 1 G/1
TABLET, FILM COATED ORAL
Qty: 180 TAB | Refills: 1 | Status: SHIPPED | OUTPATIENT
Start: 2018-07-09 | End: 2019-06-18

## 2018-07-09 ASSESSMENT — PATIENT HEALTH QUESTIONNAIRE - PHQ9: CLINICAL INTERPRETATION OF PHQ2 SCORE: 0

## 2018-07-09 NOTE — PROGRESS NOTES
Chief Complaint   Patient presents with   • Annual Exam   • Hip Pain     x1W pt state its getting worst    • Back Pain     x1W         HPI:  Hermelinda Roger is a 73 y.o. 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 90 Tab 0   • valacyclovir (VALTREX) 1 GM Tab Take 0.5 tablet once daily 180 Tab 1   • Magnesium 250 MG Tab Take  by mouth.     • Naproxen Sod-Diphenhydramine (ALEVE PM PO) Take  by mouth.     • atorvastatin (LIPITOR) 20 MG Tab Take 1 Tab by mouth every day. 90 Tab 3   • Cholecalciferol (VITAMIN D) 2000 UNIT Tab Take  by mouth every day.     • ranitidine (ZANTAC) 75 MG tablet Take 1 Tab by mouth every day. 90 Tab 1   • acetaminophen (TYLENOL) 325 MG Tab Take 650 mg by mouth every four hours as needed.     • cyanocobalamin (VITAMIN B-12) 500 MCG TABS Take 500 mcg by mouth every day.     • aspirin 81 MG tablet Take 81 mg by mouth every day.     • CALCIUM 500 + D PO Take  by mouth. 2 daily       No current facility-administered medications for this visit.             Current supplements as per medication list.       Allergies: Nabumetone; Tape; and Tramadol    Current social contact/activities: , no children, has stepchildren, lives with her , good social activities, exercises regularly.      She  reports that she has never smoked. She has never used smokeless  tobacco. She reports that she drinks about 2.4 oz of alcohol per week . She reports that she does not use drugs.  Counseling given: Not Answered      DPA/Advanced Directive:  Patient does not have an Advanced Directive.  A packet and workshop information was given on Advanced Directives.    ROS:    Gait: Uses no assistive device  Ostomy: No  Other tubes: No  Amputations: No  Chronic oxygen use: No  Last eye exam: 2016  Wears hearing aids: No   : Denies any urinary leakage during the last 6 months    Screening:    Depression Screening    Little interest or pleasure in doing things?  0 - not at all  Feeling down, depressed , or hopeless? 0 - not at all  Patient Health Questionnaire Score: 0   NO depressive symptoms identified     Screening for Cognitive Impairment    Three Minute Recall (leader, season, table)  /3 Pt declines  Yonatan clock face with all 12 numbers and set the hands to show 10 past 11.    Pt declines  Cognitive concerns identified deferred for follow up unless specifically addressed in assessment and plan.    Fall Risk Assessment    Has the patient had two or more falls in the last year or any fall with injury in the last year?  No    Safety Assessment    Throw rugs on floor.     Handrails on all stairs.     Good lighting in all hallways.     Difficulty hearing.     Patient counseled about all safety risks that were identified.    Functional Assessment ADLs    Are there any barriers preventing you from cooking for yourself or meeting nutritional needs? NO  .    Are there any barriers preventing you from driving safely or obtaining transportation?   no  Are there any barriers preventing you from using a telephone or calling for help?   no  Are there any barriers preventing you from shopping?   no  Are there any barriers preventing you from taking care of your own finances?   no  Are there any barriers preventing you from managing your medications?   no  Are there any barriers preventing you from  showering, bathing or dressing yourself? no   .    Are you currently engaging in any exercise or physical activity?   yes  What is your perception of your health?  Good       Health Maintenance Summary                Annual Wellness Visit Overdue 6/23/2018      Done 6/22/2017 Visit Dx: Medicare annual wellness visit, subsequent    BONE DENSITY Next Due 8/22/2018      Done 8/22/2016 DS-BONE DENSITY STUDY (DEXA)     Patient has more history with this topic...    IMM INFLUENZA Next Due 9/1/2018      Done 10/2/2017 Imm Admin: Influenza Vaccine Adult HD     Patient has more history with this topic...    MAMMOGRAM Next Due 1/4/2020      Done 1/4/2018 MA-MAMMO SCREENING BILAT W/TOMOSYNTHESIS W/CAD     Patient has more history with this topic...    COLONOSCOPY Next Due 5/18/2021      Done 5/18/2011     IMM DTaP/Tdap/Td Vaccine Next Due 6/5/2025      Done 6/5/2015 Imm Admin: Tdap Vaccine          Patient Care Team:  Johana Felix M.D. as PCP - General (Family Medicine)  JENI Hightower as Consulting Physician (OB/Gyn)  Ar Diaz M.D. as Consulting Physician (Orthopaedics)  Pop Bobby Jr., M.D. as Consulting Physician (Gastroenterology)  HELADIO CroftPNIELS as Consulting Physician (Dermatology)  Pankaj Beal M.D. (Orthopaedics)  JENI Garcia (Inactive) (Orthopaedics)        Social History   Substance Use Topics   • Smoking status: Never Smoker   • Smokeless tobacco: Never Used   • Alcohol use 2.4 oz/week     4 Glasses of wine per week      Comment: 2-4 per week     Family History   Problem Relation Age of Onset   • Heart Disease Father      d. MI age 56   • Genitourinary () Mother      CKD   • Heart Disease Brother 42     CABG x multi vessel   • Diabetes Brother      DM2     She  has a past medical history of Ankle fracture (1/18/2015); Atrophic vaginitis; CATARACT; Chest pain (04/2018); DJD (degenerative joint disease) of hip (8/22/2011); GENETIC SUSCEPTIBILITY TO DISEASE; GERD (gastroesophageal  "reflux disease); HERPES ZOSTER; Hyperlipidemia LDL goal < 70; Iron deficiency anemia (4/14/2011); LBBB (left bundle branch block) (9/25/2010); Leg length discrepancy; Lumbar radicular pain (12/15/2012); Osteoarthritis (8/22/2011); Osteopenia; Palpitations; Raynaud's phenomenon; Seasonal allergies; Tinnitus; and Varicose vein of leg.   Past Surgical History:   Procedure Laterality Date   • ANKLE ORIF  1/6/15    [facility]   • KNEE ARTHROSCOPY  12/29/2011    Per[facility] by [facility]   • MEDIAL MENISCECTOMY  12/29/2011    Performed by BR[facility]FORD WEBB at [facility]   • HYSTERECTOMY, VAGINAL  1985   • APPENDECTOMY  1980    was in [facility]   • LAPAROTOMY  1980    excision uterine fibroid   • HARDWARE REMOVAL ORTHO  1979    left ring finger   • HAND SURGERY  1978    ORIF left ring finger   • CATARACT EXTRACTION WITH IOL  2007, 2008    Bilateral   • TONSILLECTOMY AND ADENOIDECTOMY  as child       Exam:   Blood pressure 112/64, pulse 75, temperature (!) 35.8 °C (96.4 °F), resp. rate 12, height 1.588 m (5' 2.5\"), weight 55.6 kg (122 lb 9.6 oz), last menstrual period 03/01/1986, SpO2 98 %, not currently breastfeeding. Body mass index is 22.07 kg/m².    Hearing excellent.    Dentition good  Alert, oriented in no acute distress.  Eye contact is good, speech goal directed, affect calm    Assessment and Plan. The following treatment and monitoring plan is recommended:      1. Osteopenia with high risk of fracture  Patient had history of osteopenia and fracture of the right ankle in 2014.  Patient was started on Fosamax in 2015.  Patient has been on this medication consistently every week.  Her last DEXA scan noticeable improvement of bone density.  However, patient complains of diffuse joint pain secondary to osteoarthritis.  Patient states that Fosamax has make her symptoms worse.  Patient is interested in.  Patient continues to take vitamin D and calcium.  She is very active.  She walks " 3 miles 2 times a week.  She also swims and exercise regularly with her  3-5 times per weeks.  Except for history of fall and ankle fracture in 2014, patient has not had any fall or bone fracture since.    2. Raynaud's phenomenon without gangrene  Chronic, mild, controlled with behavioral modification.  Will monitor    3. Pure hypercholesterolemia  Chronic, well controlled with Lipitor 20 mg daily, denies any side effects.  Will continue     4. Vitamin D insufficiency  patient is taking 2000 units of vitamin D daily, will continue    5. Palpitations  Patient has history of palpitation couple months ago.  Patient was seen by cardiology.  She had that echocardiogram and CT calcium score done in April 2018.  The results as below.  Her symptoms have since resolved.  Patient attributes her symptoms to stress.    Echo, 4/2018  Normal left ventricular size and systolic function. Normal regional   wall motion. Left ventricular ejection fraction is visually estimated   to be 65%. Normal left ventricular wall thickness. Indeterminate   Diastolic function.  Compared to the report of the study done 9/2014 - there has been no   significant change.      CORONARY CT CALCIUM SCORING OF 4/18/2018  Coronary calcification:  LMA - 0.0  LCX - 0.0  LAD - 23.3  RCA - 29.3  PDA - 0.0  Calcium score:  52.3  The visualized portions of the lungs, mediastinum, and upper abdomen are within normal limits.  1.  There is definite, at least mild atherosclerotic plaque burden present.  2.  Minimal to mild coronary stenoses are likely.  3.  The patient's cardiovascular risk is moderate.  4. Aortic atherosclerotic plaque  Calcium score is at the 50th percentile for the patient's age and sex.    6. Generalized Osteoarthritis  Patient has history of generalized osteoarthritis involving her ankles, knees, hips, small joints of the hands, and elbows.  Patient is taking Tylenol 650 mg twice daily as well as naproxen as needed for worsening pain.   Patient complains of worsening arthritic pain.  She attributes her symptoms to aging as well as Fosamax.  Fosamax is discontinued per patient request as above.  Patient is very active and exercises regularly 3-5 times weekly.  Plans:  -Tylenol 650 mg 3 times daily or 4 times daily as needed, keep daily total dose of Tylenol less than 3000 mg per day.  - naproxen (NAPROSYN) 500 MG Tab; Take 1 Tab by mouth 2 times daily with meals as needed.  Dispense: 90 Tab; Refill: 0  -Continue regular exercise  -Follow-up as needed    7. Herpes simplex vulvovaginitis  Chronic, on chronic suppression with Valtrex 5 mg daily.  Patient is tolerating Valtrac well, denies any side effects.  Patient rarely has outbreaks.  - valacyclovir (VALTREX) 1 GM Tab; Take 0.5 tablet once daily  Dispense: 180 Tab; Refill: 1    8. Gastroesophageal reflux disease without esophagitis  Chronic, taking Zantac 75 mg daily.  Patient is working on dietary modification.  Symptoms are overall in good control.    Services suggested: No services needed at this time  Health Care Screening: Age-appropriate preventive services recommended by USPTF and ACIP covered by Medicare were discussed today. Services ordered if indicated and agreed upon by the patient.  Referrals offered: Community-based lifestyle interventions to reduce health risks and promote self-management and wellness, fall prevention, nutrition, physical activity, tobacco-use cessation, weight loss, and mental health services as per orders if indicated.    Discussion today about general wellness and lifestyle habits:    · Prevent falls and reduce trip hazards; Cautioned about securing or removing rugs.  · Have a working fire alarm and carbon monoxide detector;   · Engage in regular physical activity and social activities     Follow-up: Return in about 1 year (around 7/9/2019) for Annual wellness visit.

## 2018-07-10 RX ORDER — NAPROXEN 500 MG/1
500 TABLET ORAL
Qty: 60 TAB | Refills: 0 | OUTPATIENT
Start: 2018-07-10

## 2018-08-07 ENCOUNTER — APPOINTMENT (RX ONLY)
Dept: URBAN - METROPOLITAN AREA CLINIC 20 | Facility: CLINIC | Age: 73
Setting detail: DERMATOLOGY
End: 2018-08-07

## 2018-08-07 DIAGNOSIS — D22 MELANOCYTIC NEVI: ICD-10-CM

## 2018-08-07 DIAGNOSIS — L84 CORNS AND CALLOSITIES: ICD-10-CM

## 2018-08-07 DIAGNOSIS — Z71.89 OTHER SPECIFIED COUNSELING: ICD-10-CM

## 2018-08-07 DIAGNOSIS — L82.1 OTHER SEBORRHEIC KERATOSIS: ICD-10-CM

## 2018-08-07 DIAGNOSIS — L72.0 EPIDERMAL CYST: ICD-10-CM

## 2018-08-07 DIAGNOSIS — D18.0 HEMANGIOMA: ICD-10-CM

## 2018-08-07 DIAGNOSIS — L81.4 OTHER MELANIN HYPERPIGMENTATION: ICD-10-CM

## 2018-08-07 PROBLEM — D22.39 MELANOCYTIC NEVI OF OTHER PARTS OF FACE: Status: ACTIVE | Noted: 2018-08-07

## 2018-08-07 PROBLEM — D22.5 MELANOCYTIC NEVI OF TRUNK: Status: ACTIVE | Noted: 2018-08-07

## 2018-08-07 PROBLEM — D18.01 HEMANGIOMA OF SKIN AND SUBCUTANEOUS TISSUE: Status: ACTIVE | Noted: 2018-08-07

## 2018-08-07 PROBLEM — D22.72 MELANOCYTIC NEVI OF LEFT LOWER LIMB, INCLUDING HIP: Status: ACTIVE | Noted: 2018-08-07

## 2018-08-07 PROBLEM — D22.71 MELANOCYTIC NEVI OF RIGHT LOWER LIMB, INCLUDING HIP: Status: ACTIVE | Noted: 2018-08-07

## 2018-08-07 PROBLEM — D22.62 MELANOCYTIC NEVI OF LEFT UPPER LIMB, INCLUDING SHOULDER: Status: ACTIVE | Noted: 2018-08-07

## 2018-08-07 PROBLEM — D22.61 MELANOCYTIC NEVI OF RIGHT UPPER LIMB, INCLUDING SHOULDER: Status: ACTIVE | Noted: 2018-08-07

## 2018-08-07 PROCEDURE — ? COSMETIC EXTRACTIONS

## 2018-08-07 PROCEDURE — 99214 OFFICE O/P EST MOD 30 MIN: CPT

## 2018-08-07 PROCEDURE — ? SUNSCREEN RECOMMENDATIONS

## 2018-08-07 PROCEDURE — ? COUNSELING

## 2018-08-07 ASSESSMENT — LOCATION SIMPLE DESCRIPTION DERM
LOCATION SIMPLE: RIGHT FOREHEAD
LOCATION SIMPLE: RIGHT UPPER BACK
LOCATION SIMPLE: UPPER BACK
LOCATION SIMPLE: LEFT FOREARM
LOCATION SIMPLE: LEFT POSTERIOR THIGH
LOCATION SIMPLE: RIGHT POSTERIOR UPPER ARM
LOCATION SIMPLE: RIGHT FOREARM
LOCATION SIMPLE: LEFT PRETIBIAL REGION
LOCATION SIMPLE: RIGHT LOWER BACK
LOCATION SIMPLE: CHEST
LOCATION SIMPLE: RIGHT CHEEK
LOCATION SIMPLE: RIGHT PRETIBIAL REGION
LOCATION SIMPLE: RIGHT POSTERIOR THIGH
LOCATION SIMPLE: LEFT PLANTAR SURFACE
LOCATION SIMPLE: LEFT POSTERIOR UPPER ARM

## 2018-08-07 ASSESSMENT — LOCATION ZONE DERM
LOCATION ZONE: ARM
LOCATION ZONE: LEG
LOCATION ZONE: FEET
LOCATION ZONE: FACE
LOCATION ZONE: TRUNK

## 2018-08-07 ASSESSMENT — LOCATION DETAILED DESCRIPTION DERM
LOCATION DETAILED: RIGHT PROXIMAL PRETIBIAL REGION
LOCATION DETAILED: STERNAL NOTCH
LOCATION DETAILED: LEFT PLANTAR FOREFOOT OVERLYING 1ST METATARSAL
LOCATION DETAILED: LEFT LATERAL PROXIMAL PRETIBIAL REGION
LOCATION DETAILED: RIGHT INFERIOR FOREHEAD
LOCATION DETAILED: RIGHT INFERIOR MEDIAL MIDBACK
LOCATION DETAILED: RIGHT DISTAL POSTERIOR UPPER ARM
LOCATION DETAILED: RIGHT INFERIOR CENTRAL MALAR CHEEK
LOCATION DETAILED: RIGHT DISTAL POSTERIOR THIGH
LOCATION DETAILED: RIGHT SUPERIOR UPPER BACK
LOCATION DETAILED: RIGHT INFERIOR MEDIAL UPPER BACK
LOCATION DETAILED: LEFT DISTAL POSTERIOR THIGH
LOCATION DETAILED: LEFT PROXIMAL POSTERIOR UPPER ARM
LOCATION DETAILED: INFERIOR THORACIC SPINE
LOCATION DETAILED: LEFT VENTRAL PROXIMAL FOREARM
LOCATION DETAILED: RIGHT VENTRAL PROXIMAL FOREARM
LOCATION DETAILED: UPPER STERNUM

## 2018-08-07 NOTE — PROCEDURE: COSMETIC EXTRACTIONS
Post-Care Instructions: I reviewed with the patient in detail post-care instructions. Patient is to wear sunprotection, and avoid picking at any of the treated lesions. Pt may apply Vaseline to crusted or scabbing areas.
Detail Level: Detailed
Anesthesia Volume In Cc: 0
Render The Number Of Extractions: Yes
Consent: The patient's consent was obtained including but not limited to risks of crusting, scabbing, blistering, scarring, darker or lighter pigmentary change, recurrence, incomplete removal and infection.

## 2019-01-07 ENCOUNTER — HOSPITAL ENCOUNTER (OUTPATIENT)
Dept: RADIOLOGY | Facility: MEDICAL CENTER | Age: 74
End: 2019-01-07
Attending: FAMILY MEDICINE
Payer: MEDICARE

## 2019-01-07 DIAGNOSIS — Z12.31 VISIT FOR SCREENING MAMMOGRAM: ICD-10-CM

## 2019-01-07 PROCEDURE — 77063 BREAST TOMOSYNTHESIS BI: CPT

## 2019-02-05 ENCOUNTER — APPOINTMENT (RX ONLY)
Dept: URBAN - METROPOLITAN AREA CLINIC 20 | Facility: CLINIC | Age: 74
Setting detail: DERMATOLOGY
End: 2019-02-05

## 2019-02-05 DIAGNOSIS — L73.8 OTHER SPECIFIED FOLLICULAR DISORDERS: ICD-10-CM

## 2019-02-05 DIAGNOSIS — L81.4 OTHER MELANIN HYPERPIGMENTATION: ICD-10-CM

## 2019-02-05 DIAGNOSIS — L82.1 OTHER SEBORRHEIC KERATOSIS: ICD-10-CM

## 2019-02-05 DIAGNOSIS — D18.0 HEMANGIOMA: ICD-10-CM

## 2019-02-05 DIAGNOSIS — L82.0 INFLAMED SEBORRHEIC KERATOSIS: ICD-10-CM

## 2019-02-05 DIAGNOSIS — D22 MELANOCYTIC NEVI: ICD-10-CM

## 2019-02-05 DIAGNOSIS — Z71.89 OTHER SPECIFIED COUNSELING: ICD-10-CM

## 2019-02-05 PROBLEM — D22.61 MELANOCYTIC NEVI OF RIGHT UPPER LIMB, INCLUDING SHOULDER: Status: ACTIVE | Noted: 2019-02-05

## 2019-02-05 PROBLEM — D22.62 MELANOCYTIC NEVI OF LEFT UPPER LIMB, INCLUDING SHOULDER: Status: ACTIVE | Noted: 2019-02-05

## 2019-02-05 PROBLEM — D18.01 HEMANGIOMA OF SKIN AND SUBCUTANEOUS TISSUE: Status: ACTIVE | Noted: 2019-02-05

## 2019-02-05 PROBLEM — D22.5 MELANOCYTIC NEVI OF TRUNK: Status: ACTIVE | Noted: 2019-02-05

## 2019-02-05 PROCEDURE — 99214 OFFICE O/P EST MOD 30 MIN: CPT | Mod: 25

## 2019-02-05 PROCEDURE — ? SUNSCREEN RECOMMENDATIONS

## 2019-02-05 PROCEDURE — ? LIQUID NITROGEN

## 2019-02-05 PROCEDURE — 17110 DESTRUCTION B9 LES UP TO 14: CPT

## 2019-02-05 PROCEDURE — ? COUNSELING

## 2019-02-05 ASSESSMENT — LOCATION DETAILED DESCRIPTION DERM
LOCATION DETAILED: LEFT PROXIMAL DORSAL FOREARM
LOCATION DETAILED: RIGHT MID PREAURICULAR CHEEK
LOCATION DETAILED: SUPERIOR THORACIC SPINE
LOCATION DETAILED: RIGHT SUPERIOR UPPER BACK
LOCATION DETAILED: RIGHT INFERIOR UPPER BACK
LOCATION DETAILED: LEFT VENTRAL PROXIMAL FOREARM
LOCATION DETAILED: LEFT SUPERIOR MEDIAL UPPER BACK
LOCATION DETAILED: RIGHT RADIAL DORSAL HAND
LOCATION DETAILED: LEFT RADIAL DORSAL HAND
LOCATION DETAILED: RIGHT MEDIAL UPPER BACK
LOCATION DETAILED: RIGHT VENTRAL DISTAL FOREARM
LOCATION DETAILED: RIGHT PROXIMAL DORSAL FOREARM
LOCATION DETAILED: INFERIOR THORACIC SPINE
LOCATION DETAILED: RIGHT INFERIOR MEDIAL FOREHEAD

## 2019-02-05 ASSESSMENT — LOCATION SIMPLE DESCRIPTION DERM
LOCATION SIMPLE: RIGHT FOREHEAD
LOCATION SIMPLE: RIGHT CHEEK
LOCATION SIMPLE: LEFT FOREARM
LOCATION SIMPLE: RIGHT FOREARM
LOCATION SIMPLE: UPPER BACK
LOCATION SIMPLE: RIGHT HAND
LOCATION SIMPLE: LEFT HAND
LOCATION SIMPLE: RIGHT UPPER BACK
LOCATION SIMPLE: LEFT UPPER BACK

## 2019-02-05 ASSESSMENT — LOCATION ZONE DERM
LOCATION ZONE: FACE
LOCATION ZONE: ARM
LOCATION ZONE: TRUNK
LOCATION ZONE: HAND

## 2019-02-05 NOTE — PROCEDURE: LIQUID NITROGEN
Post-Care Instructions: I reviewed with the patient in detail post-care instructions. Patient is to wear sunprotection, and avoid picking at any of the treated lesions. Pt may apply Vaseline to crusted or scabbing areas.
Detail Level: Detailed
Consent: The patient's consent was obtained including but not limited to risks of crusting, scabbing, blistering, scarring, darker or lighter pigmentary change, recurrence, incomplete removal and infection.
Medical Necessity Information: It is in your best interest to select a reason for this procedure from the list below. All of these items fulfill various CMS LCD requirements except the new and changing color options.
Add 52 Modifier (Optional): no
Number Of Freeze-Thaw Cycles: 2 freeze-thaw cycles
Medical Necessity Clause: This procedure was medically necessary because the lesions that were treated were:

## 2019-03-11 ENCOUNTER — OFFICE VISIT (OUTPATIENT)
Dept: URGENT CARE | Facility: CLINIC | Age: 74
End: 2019-03-11
Payer: MEDICARE

## 2019-03-11 VITALS
DIASTOLIC BLOOD PRESSURE: 64 MMHG | SYSTOLIC BLOOD PRESSURE: 122 MMHG | WEIGHT: 122 LBS | HEART RATE: 82 BPM | OXYGEN SATURATION: 98 % | BODY MASS INDEX: 22.45 KG/M2 | HEIGHT: 62 IN | RESPIRATION RATE: 16 BRPM | TEMPERATURE: 97.9 F

## 2019-03-11 DIAGNOSIS — J01.90 ACUTE NON-RECURRENT SINUSITIS, UNSPECIFIED LOCATION: Primary | ICD-10-CM

## 2019-03-11 PROCEDURE — 99214 OFFICE O/P EST MOD 30 MIN: CPT | Performed by: NURSE PRACTITIONER

## 2019-03-11 RX ORDER — AMOXICILLIN AND CLAVULANATE POTASSIUM 875; 125 MG/1; MG/1
1 TABLET, FILM COATED ORAL 2 TIMES DAILY
Qty: 20 TAB | Refills: 0 | Status: SHIPPED | OUTPATIENT
Start: 2019-03-11 | End: 2019-03-21

## 2019-03-11 ASSESSMENT — ENCOUNTER SYMPTOMS
NEUROLOGICAL NEGATIVE: 1
CARDIOVASCULAR NEGATIVE: 1
SORE THROAT: 0
FEVER: 0
SINUS PRESSURE: 1
RESPIRATORY NEGATIVE: 1
COUGH: 0
CONSTITUTIONAL NEGATIVE: 1
SHORTNESS OF BREATH: 0
SINUS PAIN: 1

## 2019-03-12 NOTE — PROGRESS NOTES
Subjective:     Hermelinda Roger is a 73 y.o. female who presents for Cough (headache, cough, cold symptoms, sinus pain, tired started 10 days ago)       Sinusitis   This is a new problem. Episode onset: 2 days ago. The problem has been gradually worsening since onset. The pain is moderate (right side of face). Associated symptoms include congestion and sinus pressure (and pain). Pertinent negatives include no coughing, ear pain (congested), shortness of breath or sore throat.   Pt reports that about 10 days ago she developed cold-symptoms which improved, then 2 days ago started to develop sinus pain. Reports a similar episode happened about one year ago.    PMH:  has a past medical history of Ankle fracture (1/18/2015); Atrophic vaginitis; CATARACT; Chest pain (04/2018); DJD (degenerative joint disease) of hip (8/22/2011); GENETIC SUSCEPTIBILITY TO DISEASE; GERD (gastroesophageal reflux disease); HERPES ZOSTER; Hyperlipidemia LDL goal < 70; Iron deficiency anemia (4/14/2011); LBBB (left bundle branch block) (9/25/2010); Leg length discrepancy; Lumbar radicular pain (12/15/2012); Osteoarthritis (8/22/2011); Osteopenia; Palpitations; Raynaud's phenomenon; Seasonal allergies; Tinnitus; and Varicose vein of leg.    MEDS:   Current Outpatient Prescriptions:   •  amoxicillin-clavulanate (AUGMENTIN) 875-125 MG Tab, Take 1 Tab by mouth 2 times a day for 10 days., Disp: 20 Tab, Rfl: 0  •  naproxen (NAPROSYN) 500 MG Tab, Take 1 Tab by mouth 2 times daily with meals as needed., Disp: 90 Tab, Rfl: 0  •  valacyclovir (VALTREX) 1 GM Tab, Take 0.5 tablet once daily, Disp: 180 Tab, Rfl: 1  •  Magnesium 250 MG Tab, Take  by mouth., Disp: , Rfl:   •  Naproxen Sod-Diphenhydramine (ALEVE PM PO), Take  by mouth., Disp: , Rfl:   •  atorvastatin (LIPITOR) 20 MG Tab, Take 1 Tab by mouth every day., Disp: 90 Tab, Rfl: 3  •  Cholecalciferol (VITAMIN D) 2000 UNIT Tab, Take  by mouth every day., Disp: , Rfl:   •  ranitidine (ZANTAC) 75 MG  tablet, Take 1 Tab by mouth every day., Disp: 90 Tab, Rfl: 1  •  acetaminophen (TYLENOL) 325 MG Tab, Take 650 mg by mouth every four hours as needed., Disp: , Rfl:   •  cyanocobalamin (VITAMIN B-12) 500 MCG TABS, Take 500 mcg by mouth every day., Disp: , Rfl:   •  aspirin 81 MG tablet, Take 81 mg by mouth every day., Disp: , Rfl:   •  CALCIUM 500 + D PO, Take  by mouth. 2 daily, Disp: , Rfl:     ALLERGIES:   Allergies   Allergen Reactions   • Nabumetone      Neutropenia   • Tape      adheavise   • Tramadol Unspecified     Hard to move after taking     SURGHX:   Past Surgical History:   Procedure Laterality Date   • ANKLE ORIF  1/6/15    [facility]   • KNEE ARTHROSCOPY  12/29/2011    Performed by BRADFORD WEBB at [facility]   • MEDIAL MENISCECTOMY  12/29/2011    Performed by [facility] at [facility]   • HYSTERECTOMY, VAGINAL  1985   • APPENDECTOMY  1980    was in [facility]   • LAPAROTOMY  1980    excision uterine fibroid   • HARDWARE REMOVAL ORTHO  1979    left ring finger   • HAND SURGERY  1978    ORIF left ring finger   • CATARACT EXTRACTION WITH IOL  2007, 2008    Bilateral   • TONSILLECTOMY AND ADENOIDECTOMY  as child     SOCHX:  reports that she has never smoked. She has never used smokeless tobacco. She reports that she drinks about 2.4 oz of alcohol per week . She reports that she does not use drugs.     FH: Reviewed with patient, not pertinent to this visit.     Review of Systems   Constitutional: Negative.  Negative for fever.   HENT: Positive for congestion, sinus pain (right-sided) and sinus pressure (and pain). Negative for ear pain (congested) and sore throat.    Respiratory: Negative.  Negative for cough and shortness of breath.    Cardiovascular: Negative.  Negative for chest pain.   Neurological: Negative.    All other systems reviewed and are negative.    Objective:     /64 (BP Location: Left arm, Patient Position: Sitting, BP Cuff Size: Adult)   Pulse 82   Temp  "36.6 °C (97.9 °F) (Temporal)   Resp 16   Ht 1.575 m (5' 2\")   Wt 55.3 kg (122 lb)   LMP 03/01/1986   SpO2 98%   BMI 22.31 kg/m²     Physical Exam   Constitutional: She is oriented to person, place, and time. She appears well-developed and well-nourished. She is cooperative.  Non-toxic appearance. No distress.   HENT:   Head: Normocephalic and atraumatic.   Right Ear: Tympanic membrane and external ear normal.   Left Ear: Tympanic membrane and external ear normal.   Nose: Mucosal edema present. Right sinus exhibits maxillary sinus tenderness and frontal sinus tenderness.   Mouth/Throat: Uvula is midline, oropharynx is clear and moist and mucous membranes are normal.   Eyes: Pupils are equal, round, and reactive to light. Conjunctivae and EOM are normal.   Neck: Normal range of motion.   Cardiovascular: Normal rate, regular rhythm, normal heart sounds and normal pulses.    Pulmonary/Chest: Effort normal and breath sounds normal. No respiratory distress. She has no decreased breath sounds.   Abdominal: Soft. Bowel sounds are normal.   Musculoskeletal: Normal range of motion. She exhibits no deformity.   Lymphadenopathy:     She has no cervical adenopathy.   Neurological: She is alert and oriented to person, place, and time. She has normal strength. No sensory deficit.   Skin: Skin is warm, dry and intact. Capillary refill takes less than 2 seconds.   Psychiatric: She has a normal mood and affect. Her behavior is normal.   Vitals reviewed.       Assessment/Plan:     1. Acute non-recurrent sinusitis, unspecified location  - amoxicillin-clavulanate (AUGMENTIN) 875-125 MG Tab; Take 1 Tab by mouth 2 times a day for 10 days.  Dispense: 20 Tab; Refill: 0    Rx sent electronically. Discussed supportive measures including increasing fluids and rest as well as OTC symptom management including acetaminophen and/or ibuprofen PRN pain and/or fever. Discussed OTC decongestants (e.g. Sudafed), antihistamines, Flonase, and nasal " saline rinses/neti pot.    Patient advised to: Return for 1) Symptoms don't improve or worsen, or go to ER, 2) Follow up with primary care in 7-10 days.    Differential diagnosis, natural history, supportive care, and indications for immediate follow-up discussed. All questions answered. Patient agrees with the plan of care.

## 2019-03-12 NOTE — PATIENT INSTRUCTIONS

## 2019-03-22 ENCOUNTER — OFFICE VISIT (OUTPATIENT)
Dept: MEDICAL GROUP | Age: 74
End: 2019-03-22
Payer: MEDICARE

## 2019-03-22 VITALS
DIASTOLIC BLOOD PRESSURE: 76 MMHG | OXYGEN SATURATION: 97 % | SYSTOLIC BLOOD PRESSURE: 114 MMHG | TEMPERATURE: 97.5 F | BODY MASS INDEX: 22.26 KG/M2 | HEART RATE: 70 BPM | HEIGHT: 62 IN | WEIGHT: 121 LBS

## 2019-03-22 DIAGNOSIS — J01.00 ACUTE NON-RECURRENT MAXILLARY SINUSITIS: Primary | ICD-10-CM

## 2019-03-22 PROCEDURE — 99214 OFFICE O/P EST MOD 30 MIN: CPT | Performed by: FAMILY MEDICINE

## 2019-03-22 RX ORDER — DOXYCYCLINE HYCLATE 100 MG
100 TABLET ORAL 2 TIMES DAILY
Qty: 14 TAB | Refills: 0 | Status: SHIPPED | OUTPATIENT
Start: 2019-03-22 | End: 2019-03-29

## 2019-03-22 ASSESSMENT — PATIENT HEALTH QUESTIONNAIRE - PHQ9
5. POOR APPETITE OR OVEREATING: 0 - NOT AT ALL
CLINICAL INTERPRETATION OF PHQ2 SCORE: 1
SUM OF ALL RESPONSES TO PHQ QUESTIONS 1-9: 2

## 2019-03-22 NOTE — PROGRESS NOTES
Subjective:   CC: sinus pain     HPI:     Hermelinda Roger is a 73 y.o. female who is here as an established patient and presents with the following concerns:    This is a new problem to me.    Patient complains of right-sided sinus pain onset two weeks ago. She first developed a cough and headache three weeks ago that improved over a week but then changed into sinus pain, right-sided facial pain, sinus pressure, congestion and rhinorrhea. She tried taking anti-histamines, Dayquil, and Nyquil without relief. Patient was evaluated at  on 3/11/19 and was prescribed Augmentin. Her symptoms did not improve with Augmentin. She was evaluated by her audiologist on 3/19/19 who noted fluid accumulation in her right ear on exam. Patient took some Advil yesterday which moderately improved her sinus and facial pain. She used Flonase last night and this morning which mildly improved her congestion. She denies any fevers, sore throat or chills. Prior to her  evaluation, patient last took antibiotics in September, 2018 for similar symptoms which resolved on amoxicillin.     Current medicines (including changes today)  Current Outpatient Prescriptions   Medication Sig Dispense Refill   • doxycycline (VIBRAMYCIN) 100 MG Tab Take 1 Tab by mouth 2 times a day for 7 days. 14 Tab 0   • naproxen (NAPROSYN) 500 MG Tab Take 1 Tab by mouth 2 times daily with meals as needed. 90 Tab 0   • valacyclovir (VALTREX) 1 GM Tab Take 0.5 tablet once daily 180 Tab 1   • Magnesium 250 MG Tab Take  by mouth.     • Naproxen Sod-Diphenhydramine (ALEVE PM PO) Take  by mouth.     • atorvastatin (LIPITOR) 20 MG Tab Take 1 Tab by mouth every day. 90 Tab 3   • Cholecalciferol (VITAMIN D) 2000 UNIT Tab Take  by mouth every day.     • ranitidine (ZANTAC) 75 MG tablet Take 1 Tab by mouth every day. 90 Tab 1   • acetaminophen (TYLENOL) 325 MG Tab Take 650 mg by mouth every four hours as needed.     • cyanocobalamin (VITAMIN B-12) 500 MCG TABS Take 500 mcg  "by mouth every day.     • aspirin 81 MG tablet Take 81 mg by mouth every day.     • CALCIUM 500 + D PO Take  by mouth. 2 daily       No current facility-administered medications for this visit.      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    I personally reviewed patient's problem list, allergies, medications, family hx, social hx with patient and update EPIC.     REVIEW OF SYSTEMS:  CONSTITUTIONAL:  Denies night sweats, fatigue, malaise, lethargy, fever or chills.  RESPIRATORY:  Denies wheeze, hemoptysis, or shortness of breath.  CARDIOVASCULAR:  Denies chest pains, palpitations, pedal edema     Objective:     Blood pressure 114/76, pulse 70, temperature 36.4 °C (97.5 °F), temperature source Temporal, height 1.575 m (5' 2\"), weight 54.9 kg (121 lb), last menstrual period 03/01/1986, SpO2 97 %, not currently breastfeeding. Body mass index is 22.13 kg/m².    Physical Exam:  Constitutional: awake, alert, in no distress.  Skin: Warm, dry, good turgor, no rashes, bruises, ulcers in visible areas.  Eye: conjunctiva clear, lids neg for edema or lesions.  ENMT: TM and auditory canals wnl. Inflamed nasal mucosa with inferior nasal turbinate hypertrophy bilaterally, moderate mucus.  Moderate postnasal drip. Oral mucosa wnl. Lips without lesions, good dentition, oropharynx clear. Tenderness to percussion on the right cheek.   Neck: Trachea midline, no masses, no thyromegaly. No cervical or supraclavicular " lymphadenopathy  Respiratory: Unlabored respiratory effort, lungs clear to auscultation, no wheezes, no rales.  Cardiovascular: Normal S1, S2, no murmur, no pedal edema.  Psych: Oriented x3, affect and mood wnl, intact judgement and insight.     Assessment and Plan:   The following treatment plan was discussed    1. Acute non-recurrent maxillary sinusitis  Acute right-sided facial pain for 2-3 weeks, failed to respond to Augmentin as well as other conservative management.  She denies fever, chills, endorses right facial pain.  Exam is concerning for acute right maxillary sinusitis.  Plans:  -doxycycline (VIBRAMYCIN) 100 MG Tab; Take 1 Tab by mouth 2 times a day for 7 days.  Dispense: 14 Tab; Rediagmed   -Nasal saline irrigation  -Over-the-counter analgesics PRN for pain  -Follow-up as needed    Johana Felix M.D.    Followup: Return for As needed.    Please note that this dictation was created using voice recognition software and/or scribes. I have made every reasonable attempt to correct obvious errors, but I expect that there are errors of grammar and possibly content that I did not discover before finalizing the note.     Ralf TEAGUE (Scribe), am scribing for, and in the presence of, Johana Felix M.D.    Electronically signed by: Ralf Langley (José Migueliberic), 3/22/2019    Johana TEAGUE M.D. personally performed the services described in this documentation, as scribed by Ralf Langley in my presence, and it is both accurate and complete.

## 2019-05-30 ENCOUNTER — PATIENT MESSAGE (OUTPATIENT)
Dept: MEDICAL GROUP | Age: 74
End: 2019-05-30

## 2019-05-30 DIAGNOSIS — E78.00 PURE HYPERCHOLESTEROLEMIA: ICD-10-CM

## 2019-06-12 ENCOUNTER — HOSPITAL ENCOUNTER (OUTPATIENT)
Dept: LAB | Facility: MEDICAL CENTER | Age: 74
End: 2019-06-12
Attending: FAMILY MEDICINE
Payer: MEDICARE

## 2019-06-12 ENCOUNTER — APPOINTMENT (RX ONLY)
Dept: URBAN - METROPOLITAN AREA CLINIC 20 | Facility: CLINIC | Age: 74
Setting detail: DERMATOLOGY
End: 2019-06-12

## 2019-06-12 DIAGNOSIS — E78.00 PURE HYPERCHOLESTEROLEMIA: ICD-10-CM

## 2019-06-12 DIAGNOSIS — L73.8 OTHER SPECIFIED FOLLICULAR DISORDERS: ICD-10-CM

## 2019-06-12 LAB
ALBUMIN SERPL BCP-MCNC: 3.7 G/DL (ref 3.2–4.9)
ALBUMIN/GLOB SERPL: 1.2 G/DL
ALP SERPL-CCNC: 55 U/L (ref 30–99)
ALT SERPL-CCNC: 9 U/L (ref 2–50)
ANION GAP SERPL CALC-SCNC: 6 MMOL/L (ref 0–11.9)
AST SERPL-CCNC: 14 U/L (ref 12–45)
BASOPHILS # BLD AUTO: 0.7 % (ref 0–1.8)
BASOPHILS # BLD: 0.03 K/UL (ref 0–0.12)
BILIRUB SERPL-MCNC: 0.4 MG/DL (ref 0.1–1.5)
BUN SERPL-MCNC: 17 MG/DL (ref 8–22)
CALCIUM SERPL-MCNC: 8.6 MG/DL (ref 8.5–10.5)
CHLORIDE SERPL-SCNC: 107 MMOL/L (ref 96–112)
CHOLEST SERPL-MCNC: 172 MG/DL (ref 100–199)
CO2 SERPL-SCNC: 24 MMOL/L (ref 20–33)
CREAT SERPL-MCNC: 0.78 MG/DL (ref 0.5–1.4)
EOSINOPHIL # BLD AUTO: 0.41 K/UL (ref 0–0.51)
EOSINOPHIL NFR BLD: 9.8 % (ref 0–6.9)
ERYTHROCYTE [DISTWIDTH] IN BLOOD BY AUTOMATED COUNT: 49.9 FL (ref 35.9–50)
FASTING STATUS PATIENT QL REPORTED: NORMAL
GLOBULIN SER CALC-MCNC: 3 G/DL (ref 1.9–3.5)
GLUCOSE SERPL-MCNC: 87 MG/DL (ref 65–99)
HCT VFR BLD AUTO: 39.2 % (ref 37–47)
HDLC SERPL-MCNC: 52 MG/DL
HGB BLD-MCNC: 12.5 G/DL (ref 12–16)
IMM GRANULOCYTES # BLD AUTO: 0.01 K/UL (ref 0–0.11)
IMM GRANULOCYTES NFR BLD AUTO: 0.2 % (ref 0–0.9)
LDLC SERPL CALC-MCNC: 94 MG/DL
LYMPHOCYTES # BLD AUTO: 0.82 K/UL (ref 1–4.8)
LYMPHOCYTES NFR BLD: 19.5 % (ref 22–41)
MCH RBC QN AUTO: 32.6 PG (ref 27–33)
MCHC RBC AUTO-ENTMCNC: 31.9 G/DL (ref 33.6–35)
MCV RBC AUTO: 102.1 FL (ref 81.4–97.8)
MONOCYTES # BLD AUTO: 0.67 K/UL (ref 0–0.85)
MONOCYTES NFR BLD AUTO: 16 % (ref 0–13.4)
NEUTROPHILS # BLD AUTO: 2.26 K/UL (ref 2–7.15)
NEUTROPHILS NFR BLD: 53.8 % (ref 44–72)
NRBC # BLD AUTO: 0 K/UL
NRBC BLD-RTO: 0 /100 WBC
PLATELET # BLD AUTO: 226 K/UL (ref 164–446)
PMV BLD AUTO: 10.6 FL (ref 9–12.9)
POTASSIUM SERPL-SCNC: 4 MMOL/L (ref 3.6–5.5)
PROT SERPL-MCNC: 6.7 G/DL (ref 6–8.2)
RBC # BLD AUTO: 3.84 M/UL (ref 4.2–5.4)
SODIUM SERPL-SCNC: 137 MMOL/L (ref 135–145)
TRIGL SERPL-MCNC: 129 MG/DL (ref 0–149)
WBC # BLD AUTO: 4.2 K/UL (ref 4.8–10.8)

## 2019-06-12 PROCEDURE — 36415 COLL VENOUS BLD VENIPUNCTURE: CPT

## 2019-06-12 PROCEDURE — 85025 COMPLETE CBC W/AUTO DIFF WBC: CPT

## 2019-06-12 PROCEDURE — 99213 OFFICE O/P EST LOW 20 MIN: CPT

## 2019-06-12 PROCEDURE — 80061 LIPID PANEL: CPT

## 2019-06-12 PROCEDURE — ? COUNSELING

## 2019-06-12 PROCEDURE — 80053 COMPREHEN METABOLIC PANEL: CPT

## 2019-06-12 ASSESSMENT — LOCATION ZONE DERM: LOCATION ZONE: FACE

## 2019-06-12 ASSESSMENT — LOCATION DETAILED DESCRIPTION DERM: LOCATION DETAILED: RIGHT MEDIAL MALAR CHEEK

## 2019-06-12 ASSESSMENT — LOCATION SIMPLE DESCRIPTION DERM: LOCATION SIMPLE: RIGHT CHEEK

## 2019-06-18 ENCOUNTER — OFFICE VISIT (OUTPATIENT)
Dept: MEDICAL GROUP | Age: 74
End: 2019-06-18
Payer: MEDICARE

## 2019-06-18 VITALS
WEIGHT: 122.6 LBS | BODY MASS INDEX: 21.72 KG/M2 | OXYGEN SATURATION: 98 % | SYSTOLIC BLOOD PRESSURE: 108 MMHG | DIASTOLIC BLOOD PRESSURE: 60 MMHG | HEIGHT: 63 IN | HEART RATE: 83 BPM | TEMPERATURE: 98 F

## 2019-06-18 DIAGNOSIS — M15.9 GENERALIZED OSTEOARTHRITIS: ICD-10-CM

## 2019-06-18 DIAGNOSIS — K21.9 GASTROESOPHAGEAL REFLUX DISEASE WITHOUT ESOPHAGITIS: ICD-10-CM

## 2019-06-18 DIAGNOSIS — Z15.89 GENETIC SUSCEPTIBILITY TO DISEASE: ICD-10-CM

## 2019-06-18 DIAGNOSIS — E78.00 PURE HYPERCHOLESTEROLEMIA: Primary | ICD-10-CM

## 2019-06-18 PROBLEM — R00.2 PALPITATIONS: Status: RESOLVED | Noted: 2018-04-16 | Resolved: 2019-06-18

## 2019-06-18 PROCEDURE — 99214 OFFICE O/P EST MOD 30 MIN: CPT | Performed by: FAMILY MEDICINE

## 2019-06-18 RX ORDER — ATORVASTATIN CALCIUM 20 MG/1
20 TABLET, FILM COATED ORAL DAILY
Qty: 100 TAB | Refills: 3 | Status: SHIPPED | OUTPATIENT
Start: 2019-06-18 | End: 2020-07-13

## 2019-06-18 NOTE — PROGRESS NOTES
Subjective:   CC: Lab review    HPI:     Hermelinda Roger is a 74 y.o. female who is an established patient of the clinic, presents with the following concerns:     The patient underwent a rhinoplasty performed by Dr. Mitchell, ENT within the last year. She developed a subsequent sinus infection and was treated with steroids for two weeks as well as an antibiotic. She continues to lavage her sinuses. The patient has a history of grass allergies. She does not take an allergy medication. She swims several times a week and this usually clears out her sinuses. She played golf yesterday.    The patient has a history of dyslipidemia, GERD, osteoarthritis. She is compliant with her medications and denies any side effects from them. She was in [place] in May and the change in climate, stress, and manual labor of moving her mother out of her house exacerbated her arthritic pain. The pain has improved since then and she continues her daily pain medication regimen. She takes 162 mg aspirin daily as Dr. 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 She is requesting a refill of her Lipitor.      Current medicines (including changes today)  Current Outpatient Prescriptions   Medication Sig Dispense Refill   • atorvastatin (LIPITOR) 20 MG Tab Take 1 Tab by mouth every day. 100 Tab 3   • Magnesium 250 MG Tab Take  by mouth.     • Naproxen Sod-Diphenhydramine (ALEVE PM PO) Take  by mouth.     • Cholecalciferol (VITAMIN D) 2000 UNIT Tab Take  by mouth every day.     • ranitidine (ZANTAC) 75 MG tablet Take 1 Tab by mouth every day. 90 Tab 1   • acetaminophen (TYLENOL) 325 MG Tab Take 650 mg by mouth every four hours as needed.     • cyanocobalamin (VITAMIN B-12) 500 MCG TABS Take 500 mcg by mouth every day.     • aspirin 81 MG tablet Take 81 mg by mouth every day.     • CALCIUM 500 + D PO Take  by mouth. 2 daily       No current facility-administered medications for this visit.      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      I personally reviewed patient's problem list, allergies, medications, family hx, social hx with patient and update EPIC.     REVIEW OF SYSTEMS:  CONSTITUTIONAL:  Denies night sweats, fatigue, malaise, lethargy, fever or chills.  RESPIRATORY:  Denies cough, wheeze, hemoptysis, or shortness of breath.  CARDIOVASCULAR:  Denies chest pains, palpitations, pedal edema     Objective:     /60   Pulse 83   Temp 36.7 °C (98 °F)   Ht 1.588 m (5' 2.5\")   Wt 55.6 kg (122 lb 9.6 oz)   SpO2 98%  Body mass index is 22.07 kg/m².    Physical Exam:  Constitutional: awake, alert, in no distress.  Skin: Warm, dry, good turgor, no rashes, bruises, ulcers in visible areas.  Eye: conjunctiva clear, lids neg for edema or lesions.  ENMT: TM and auditory canals wnl. Oral and nasal mucosa wnl. Lips without lesions, good dentition, oropharynx clear.  Neck: Trachea midline, no masses, no thyromegaly. No cervical or supraclavicular lymphadenopathy  Respiratory: Unlabored respiratory effort, lungs clear to auscultation, no wheezes, no rales.  Cardiovascular: Normal S1, S2, no murmur, no pedal edema  Psych: Oriented x3, affect and mood wnl, intact judgement and insight.       Assessment and Plan:   The following treatment plan was discussed    1. Pure hypercholesterolemia  Chronic, controlled with Lipitor 20 mg daily, denies any side effects.  -Continue atorvastatin (LIPITOR) 20 MG Tab; Take " 1 Tab by mouth every day.  Dispense: 100 Tab; Refill: 3  - recommended dietary and lifestyle modification     2. Gastroesophageal reflux disease without esophagitis  Chronic, controlled with ranitidine 75 mg daily.  No side effect reported.  Will continue.  -Appropriate counseling regarding GERD discussed with patient. Topic might include: weight loss, avoid loose fitting, elevated the head of the bed, avoid common food triggers (fatty or fried foods, tomato sauce, alcohol, chocolate, mint, garlic, onion, and caffeine), smoking cessation, avoid excessive alcohol consumption.       3. Generalized osteoarthritis  Chronic, affecting multiple large joints, controlled with Tylenol daily and physical activities.  -Continue over-the-counter analgesics PRN for pain  -Activity modification  -Regular exercise  -Follow-up PRN    4. 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  Positive familial history of cardiovascular disease.  She is taking 2 baby aspirin daily as recommended by cardiologist.  Patient is doing well, denies active bleeding.  -Continue aspirin as directed      Joahna Felix M.D.    Followup: Return in about 6 months (around 12/18/2019) for Annual wellness visit.    Please note that this dictation was created using voice recognition software and/or scribes. I have made every reasonable attempt to correct obvious errors, but I expect that there are errors of grammar and possibly content that I did not discover before finalizing the note.     Kel TEAGUE (Scribe), am scribing for, and in the presence of, Johana Felix M.D.    Electronically signed by: Kel Barahona (Kirill), 6/18/2019    Johana TEAGUE M.D. personally performed the services described in this documentation, as scribed by Kel Barahona in my presence, and it is both accurate and complete.

## 2019-06-19 ENCOUNTER — TELEPHONE (OUTPATIENT)
Dept: MEDICAL GROUP | Age: 74
End: 2019-06-19

## 2019-06-19 NOTE — TELEPHONE ENCOUNTER
1. Caller Name: Hermelinda Aliya Burton     Call Back Number: [phone] (home)         Patient approves a detailed voicemail message: N\A    Pt called and states that pharmacy did not receive her medication yesterday. Pharmacy is not open until 10am. Will call to follow up with Pharmacy.

## 2019-08-06 ENCOUNTER — APPOINTMENT (RX ONLY)
Dept: URBAN - METROPOLITAN AREA CLINIC 20 | Facility: CLINIC | Age: 74
Setting detail: DERMATOLOGY
End: 2019-08-06

## 2019-08-06 DIAGNOSIS — L81.4 OTHER MELANIN HYPERPIGMENTATION: ICD-10-CM

## 2019-08-06 DIAGNOSIS — D18.0 HEMANGIOMA: ICD-10-CM

## 2019-08-06 DIAGNOSIS — Z71.89 OTHER SPECIFIED COUNSELING: ICD-10-CM

## 2019-08-06 DIAGNOSIS — L82.0 INFLAMED SEBORRHEIC KERATOSIS: ICD-10-CM

## 2019-08-06 DIAGNOSIS — D22 MELANOCYTIC NEVI: ICD-10-CM

## 2019-08-06 DIAGNOSIS — L82.1 OTHER SEBORRHEIC KERATOSIS: ICD-10-CM

## 2019-08-06 PROBLEM — D22.71 MELANOCYTIC NEVI OF RIGHT LOWER LIMB, INCLUDING HIP: Status: ACTIVE | Noted: 2019-08-06

## 2019-08-06 PROBLEM — D18.01 HEMANGIOMA OF SKIN AND SUBCUTANEOUS TISSUE: Status: ACTIVE | Noted: 2019-08-06

## 2019-08-06 PROBLEM — D22.62 MELANOCYTIC NEVI OF LEFT UPPER LIMB, INCLUDING SHOULDER: Status: ACTIVE | Noted: 2019-08-06

## 2019-08-06 PROBLEM — D22.5 MELANOCYTIC NEVI OF TRUNK: Status: ACTIVE | Noted: 2019-08-06

## 2019-08-06 PROBLEM — D22.61 MELANOCYTIC NEVI OF RIGHT UPPER LIMB, INCLUDING SHOULDER: Status: ACTIVE | Noted: 2019-08-06

## 2019-08-06 PROBLEM — D22.72 MELANOCYTIC NEVI OF LEFT LOWER LIMB, INCLUDING HIP: Status: ACTIVE | Noted: 2019-08-06

## 2019-08-06 PROBLEM — D22.39 MELANOCYTIC NEVI OF OTHER PARTS OF FACE: Status: ACTIVE | Noted: 2019-08-06

## 2019-08-06 PROCEDURE — 17110 DESTRUCTION B9 LES UP TO 14: CPT

## 2019-08-06 PROCEDURE — ? SUNSCREEN RECOMMENDATIONS

## 2019-08-06 PROCEDURE — 99214 OFFICE O/P EST MOD 30 MIN: CPT | Mod: 25

## 2019-08-06 PROCEDURE — ? LIQUID NITROGEN

## 2019-08-06 PROCEDURE — ? COUNSELING

## 2019-08-06 ASSESSMENT — LOCATION DETAILED DESCRIPTION DERM
LOCATION DETAILED: RIGHT DISTAL POSTERIOR THIGH
LOCATION DETAILED: RIGHT RIB CAGE
LOCATION DETAILED: LEFT LATERAL PROXIMAL PRETIBIAL REGION
LOCATION DETAILED: LEFT VENTRAL PROXIMAL FOREARM
LOCATION DETAILED: LEFT DISTAL POSTERIOR THIGH
LOCATION DETAILED: LEFT PROXIMAL POSTERIOR UPPER ARM
LOCATION DETAILED: RIGHT SUPERIOR LATERAL MIDBACK
LOCATION DETAILED: INFERIOR THORACIC SPINE
LOCATION DETAILED: RIGHT LATERAL UPPER BACK
LOCATION DETAILED: RIGHT SUPERIOR UPPER BACK
LOCATION DETAILED: RIGHT INFERIOR UPPER BACK
LOCATION DETAILED: RIGHT PROXIMAL PRETIBIAL REGION
LOCATION DETAILED: RIGHT INFERIOR LATERAL UPPER BACK
LOCATION DETAILED: RIGHT VENTRAL PROXIMAL FOREARM
LOCATION DETAILED: RIGHT MID-UPPER BACK
LOCATION DETAILED: RIGHT DISTAL POSTERIOR UPPER ARM
LOCATION DETAILED: LEFT RIB CAGE
LOCATION DETAILED: RIGHT INFERIOR FOREHEAD
LOCATION DETAILED: RIGHT INFERIOR MEDIAL UPPER BACK
LOCATION DETAILED: SUBXIPHOID
LOCATION DETAILED: RIGHT INFERIOR CENTRAL MALAR CHEEK
LOCATION DETAILED: RIGHT INFERIOR MEDIAL MIDBACK

## 2019-08-06 ASSESSMENT — LOCATION ZONE DERM
LOCATION ZONE: TRUNK
LOCATION ZONE: ARM
LOCATION ZONE: FACE
LOCATION ZONE: LEG

## 2019-08-06 ASSESSMENT — LOCATION SIMPLE DESCRIPTION DERM
LOCATION SIMPLE: ABDOMEN
LOCATION SIMPLE: RIGHT PRETIBIAL REGION
LOCATION SIMPLE: RIGHT POSTERIOR UPPER ARM
LOCATION SIMPLE: LEFT PRETIBIAL REGION
LOCATION SIMPLE: LEFT POSTERIOR UPPER ARM
LOCATION SIMPLE: UPPER BACK
LOCATION SIMPLE: LEFT FOREARM
LOCATION SIMPLE: RIGHT FOREHEAD
LOCATION SIMPLE: RIGHT UPPER BACK
LOCATION SIMPLE: RIGHT POSTERIOR THIGH
LOCATION SIMPLE: LEFT POSTERIOR THIGH
LOCATION SIMPLE: RIGHT CHEEK
LOCATION SIMPLE: RIGHT FOREARM
LOCATION SIMPLE: RIGHT LOWER BACK

## 2019-08-06 NOTE — PROCEDURE: LIQUID NITROGEN
Medical Necessity Clause: This procedure was medically necessary because the lesions that were treated were:
Number Of Freeze-Thaw Cycles: 2 freeze-thaw cycles
Render Note In Bullet Format When Appropriate: No
Post-Care Instructions: I reviewed with the patient in detail post-care instructions. Patient is to wear sunprotection, and avoid picking at any of the treated lesions. Pt may apply Vaseline to crusted or scabbing areas.
Medical Necessity Information: It is in your best interest to select a reason for this procedure from the list below. All of these items fulfill various CMS LCD requirements except the new and changing color options.
Detail Level: Detailed
Consent: The patient's consent was obtained including but not limited to risks of crusting, scabbing, blistering, scarring, darker or lighter pigmentary change, recurrence, incomplete removal and infection.

## 2019-12-31 ENCOUNTER — PATIENT MESSAGE (OUTPATIENT)
Dept: MEDICAL GROUP | Age: 74
End: 2019-12-31

## 2019-12-31 DIAGNOSIS — Z78.0 MENOPAUSE: ICD-10-CM

## 2019-12-31 DIAGNOSIS — M85.80 OSTEOPENIA WITH HIGH RISK OF FRACTURE: ICD-10-CM

## 2020-02-05 ENCOUNTER — HOSPITAL ENCOUNTER (OUTPATIENT)
Dept: RADIOLOGY | Facility: MEDICAL CENTER | Age: 75
End: 2020-02-05
Attending: FAMILY MEDICINE
Payer: MEDICARE

## 2020-02-05 DIAGNOSIS — M85.80 OSTEOPENIA WITH HIGH RISK OF FRACTURE: ICD-10-CM

## 2020-02-05 DIAGNOSIS — Z78.0 MENOPAUSE: ICD-10-CM

## 2020-02-05 DIAGNOSIS — Z12.31 OTHER SCREENING MAMMOGRAM: ICD-10-CM

## 2020-02-05 PROCEDURE — 77067 SCR MAMMO BI INCL CAD: CPT

## 2020-02-05 PROCEDURE — 77080 DXA BONE DENSITY AXIAL: CPT

## 2020-03-17 ENCOUNTER — TELEPHONE (OUTPATIENT)
Dept: HEALTH INFORMATION MANAGEMENT | Facility: OTHER | Age: 75
End: 2020-03-17

## 2020-03-17 NOTE — TELEPHONE ENCOUNTER
1. Caller Name: Dinaa                 Call Back Number: [phone]  [facility] PCP or Specialty Provider: Yes         2.  Does patient have any active symptoms of respiratory illness (fever OR cough OR shortness of breath)? Yes, the patient reports the following respiratory symptoms: cough.  Headache mild runny nose  3.  Does patient have any comoribidities? None     4.  In the last 30 days, has the patient traveled outside of the country OR in a high risk area within the [place] OR have any known contact with someone who has or is suspected to have COVID-19?  Yes     5. Disposition: Advised to perform self care, monitor for worsening symptoms and to call back in 3 days if no improvement      Note routed to PCP: DANYA only.

## 2020-03-18 ENCOUNTER — APPOINTMENT (OUTPATIENT)
Dept: RADIOLOGY | Facility: IMAGING CENTER | Age: 75
End: 2020-03-18
Attending: FAMILY MEDICINE
Payer: MEDICARE

## 2020-03-18 ENCOUNTER — OFFICE VISIT (OUTPATIENT)
Dept: URGENT CARE | Facility: CLINIC | Age: 75
End: 2020-03-18
Payer: MEDICARE

## 2020-03-18 VITALS
OXYGEN SATURATION: 97 % | SYSTOLIC BLOOD PRESSURE: 110 MMHG | RESPIRATION RATE: 19 BRPM | HEART RATE: 98 BPM | DIASTOLIC BLOOD PRESSURE: 62 MMHG | TEMPERATURE: 98.6 F

## 2020-03-18 DIAGNOSIS — R05.9 COUGH: ICD-10-CM

## 2020-03-18 DIAGNOSIS — R06.02 SHORTNESS OF BREATH: ICD-10-CM

## 2020-03-18 DIAGNOSIS — R51.9 NONINTRACTABLE HEADACHE, UNSPECIFIED CHRONICITY PATTERN, UNSPECIFIED HEADACHE TYPE: ICD-10-CM

## 2020-03-18 LAB
FLUAV+FLUBV AG SPEC QL IA: NEGATIVE
INT CON NEG: NORMAL
INT CON POS: NORMAL

## 2020-03-18 PROCEDURE — 71046 X-RAY EXAM CHEST 2 VIEWS: CPT | Mod: TC | Performed by: FAMILY MEDICINE

## 2020-03-18 PROCEDURE — 87804 INFLUENZA ASSAY W/OPTIC: CPT | Performed by: FAMILY MEDICINE

## 2020-03-18 PROCEDURE — 99214 OFFICE O/P EST MOD 30 MIN: CPT | Performed by: FAMILY MEDICINE

## 2020-03-18 RX ORDER — BENZONATATE 100 MG/1
100 CAPSULE ORAL 3 TIMES DAILY PRN
Qty: 30 CAP | Refills: 0 | Status: SHIPPED | OUTPATIENT
Start: 2020-03-18 | End: 2020-07-14

## 2020-03-18 RX ORDER — ACETAMINOPHEN 500 MG
1000 TABLET ORAL ONCE
Status: COMPLETED | OUTPATIENT
Start: 2020-03-18 | End: 2020-03-18

## 2020-03-18 RX ADMIN — Medication 1000 MG: at 10:33

## 2020-03-18 ASSESSMENT — ENCOUNTER SYMPTOMS
WEIGHT LOSS: 0
NAUSEA: 0
MYALGIAS: 0
VOMITING: 0
COUGH: 1
EYE REDNESS: 0
EYE DISCHARGE: 0

## 2020-03-18 NOTE — PROGRESS NOTES
Subjective:      Hermelinda Roger is a 74 y.o. female who presents with Cough (sweating, body aches, headache, x4days)            4d dry cough, HA, chills, myalgia, fatigue. +SOB and  notes shallow breathing. No wheeze. No PMH asthma/copd/pneumonia.   OTC tussin with minimal relief of moderate to severe sx's that are progressively worse. No other aggravating or alleviating factors.      Review of Systems   Constitutional: Negative for malaise/fatigue and weight loss.   HENT: Positive for ear pain (mild left).    Eyes: Negative for discharge and redness.   Respiratory: Positive for cough.    Cardiovascular: Positive for chest pain (with cough). Negative for leg swelling.   Gastrointestinal: Negative for nausea and vomiting.   Musculoskeletal: Negative for joint pain and myalgias.   Skin: Negative for itching and rash.     .  Medications, Allergies, and current problem list reviewed today in Epic       Objective:     /60   Pulse (!) 116   Temp 37.2 °C (99 °F) (Temporal)   Resp 19   LMP 03/01/1986   SpO2 96%      Physical Exam  Constitutional:       General: She is not in acute distress.     Appearance: She is well-developed.   HENT:      Head: Normocephalic and atraumatic.      Right Ear: Tympanic membrane normal.      Left Ear: Tympanic membrane normal.   Eyes:      Conjunctiva/sclera: Conjunctivae normal.   Cardiovascular:      Rate and Rhythm: Regular rhythm. Tachycardia present.      Heart sounds: Murmur (soft systolic) present.   Pulmonary:      Effort: Pulmonary effort is normal.      Breath sounds: Normal breath sounds. No wheezing.   Skin:     General: Skin is warm and dry.      Findings: No rash.   Neurological:      Mental Status: She is alert and oriented to person, place, and time.                 Assessment/Plan:     CXR: no acute cardiopulmonary process per radiology  POCT influenza negative    1. Cough  POCT Influenza A/B    DX-CHEST-2 VIEWS    benzonatate (TESSALON PERLES) 100 MG  Cap   2. Shortness of breath  POCT Influenza A/B    DX-CHEST-2 VIEWS   3. Nonintractable headache, unspecified chronicity pattern, unspecified headache type  acetaminophen (TYLENOL) tablet 1,000 mg       Differential diagnosis, natural history, supportive care, and indications for immediate follow-up discussed at length.

## 2020-05-19 ENCOUNTER — PATIENT MESSAGE (OUTPATIENT)
Dept: MEDICAL GROUP | Age: 75
End: 2020-05-19

## 2020-06-03 ENCOUNTER — PATIENT MESSAGE (OUTPATIENT)
Dept: MEDICAL GROUP | Age: 75
End: 2020-06-03

## 2020-06-03 DIAGNOSIS — E78.00 PURE HYPERCHOLESTEROLEMIA: ICD-10-CM

## 2020-07-08 ENCOUNTER — HOSPITAL ENCOUNTER (OUTPATIENT)
Dept: LAB | Facility: MEDICAL CENTER | Age: 75
End: 2020-07-08
Attending: FAMILY MEDICINE
Payer: MEDICARE

## 2020-07-08 DIAGNOSIS — E78.00 PURE HYPERCHOLESTEROLEMIA: ICD-10-CM

## 2020-07-08 LAB
ALBUMIN SERPL BCP-MCNC: 3.9 G/DL (ref 3.2–4.9)
ALBUMIN/GLOB SERPL: 1.4 G/DL
ALP SERPL-CCNC: 62 U/L (ref 30–99)
ALT SERPL-CCNC: 8 U/L (ref 2–50)
ANION GAP SERPL CALC-SCNC: 7 MMOL/L (ref 7–16)
AST SERPL-CCNC: 13 U/L (ref 12–45)
BASOPHILS # BLD AUTO: 0.8 % (ref 0–1.8)
BASOPHILS # BLD: 0.03 K/UL (ref 0–0.12)
BILIRUB SERPL-MCNC: 0.4 MG/DL (ref 0.1–1.5)
BUN SERPL-MCNC: 18 MG/DL (ref 8–22)
CALCIUM SERPL-MCNC: 8.9 MG/DL (ref 8.5–10.5)
CHLORIDE SERPL-SCNC: 104 MMOL/L (ref 96–112)
CHOLEST SERPL-MCNC: 159 MG/DL (ref 100–199)
CO2 SERPL-SCNC: 25 MMOL/L (ref 20–33)
CREAT SERPL-MCNC: 0.75 MG/DL (ref 0.5–1.4)
EOSINOPHIL # BLD AUTO: 0.18 K/UL (ref 0–0.51)
EOSINOPHIL NFR BLD: 4.9 % (ref 0–6.9)
ERYTHROCYTE [DISTWIDTH] IN BLOOD BY AUTOMATED COUNT: 46.3 FL (ref 35.9–50)
FASTING STATUS PATIENT QL REPORTED: NORMAL
GLOBULIN SER CALC-MCNC: 2.8 G/DL (ref 1.9–3.5)
GLUCOSE SERPL-MCNC: 91 MG/DL (ref 65–99)
HCT VFR BLD AUTO: 39.7 % (ref 37–47)
HDLC SERPL-MCNC: 48 MG/DL
HGB BLD-MCNC: 12.9 G/DL (ref 12–16)
IMM GRANULOCYTES # BLD AUTO: 0.01 K/UL (ref 0–0.11)
IMM GRANULOCYTES NFR BLD AUTO: 0.3 % (ref 0–0.9)
LDLC SERPL CALC-MCNC: 89 MG/DL
LYMPHOCYTES # BLD AUTO: 1.42 K/UL (ref 1–4.8)
LYMPHOCYTES NFR BLD: 38.3 % (ref 22–41)
MCH RBC QN AUTO: 33.2 PG (ref 27–33)
MCHC RBC AUTO-ENTMCNC: 32.5 G/DL (ref 33.6–35)
MCV RBC AUTO: 102.3 FL (ref 81.4–97.8)
MONOCYTES # BLD AUTO: 0.51 K/UL (ref 0–0.85)
MONOCYTES NFR BLD AUTO: 13.7 % (ref 0–13.4)
NEUTROPHILS # BLD AUTO: 1.56 K/UL (ref 2–7.15)
NEUTROPHILS NFR BLD: 42 % (ref 44–72)
NRBC # BLD AUTO: 0 K/UL
NRBC BLD-RTO: 0 /100 WBC
PLATELET # BLD AUTO: 247 K/UL (ref 164–446)
PMV BLD AUTO: 10.1 FL (ref 9–12.9)
POTASSIUM SERPL-SCNC: 4.2 MMOL/L (ref 3.6–5.5)
PROT SERPL-MCNC: 6.7 G/DL (ref 6–8.2)
RBC # BLD AUTO: 3.88 M/UL (ref 4.2–5.4)
SODIUM SERPL-SCNC: 136 MMOL/L (ref 135–145)
TRIGL SERPL-MCNC: 108 MG/DL (ref 0–149)
WBC # BLD AUTO: 3.7 K/UL (ref 4.8–10.8)

## 2020-07-08 PROCEDURE — 36415 COLL VENOUS BLD VENIPUNCTURE: CPT

## 2020-07-08 PROCEDURE — 80053 COMPREHEN METABOLIC PANEL: CPT

## 2020-07-08 PROCEDURE — 80061 LIPID PANEL: CPT

## 2020-07-08 PROCEDURE — 85025 COMPLETE CBC W/AUTO DIFF WBC: CPT

## 2020-07-13 DIAGNOSIS — E78.00 PURE HYPERCHOLESTEROLEMIA: ICD-10-CM

## 2020-07-13 RX ORDER — ATORVASTATIN CALCIUM 20 MG/1
TABLET, FILM COATED ORAL
Qty: 100 TAB | Refills: 3 | Status: SHIPPED | OUTPATIENT
Start: 2020-07-13 | End: 2020-07-14 | Stop reason: SDUPTHER

## 2020-07-14 ENCOUNTER — HOSPITAL ENCOUNTER (OUTPATIENT)
Facility: MEDICAL CENTER | Age: 75
End: 2020-07-14
Attending: FAMILY MEDICINE
Payer: MEDICARE

## 2020-07-14 ENCOUNTER — OFFICE VISIT (OUTPATIENT)
Dept: MEDICAL GROUP | Age: 75
End: 2020-07-14
Payer: MEDICARE

## 2020-07-14 VITALS
SYSTOLIC BLOOD PRESSURE: 128 MMHG | WEIGHT: 125 LBS | OXYGEN SATURATION: 97 % | BODY MASS INDEX: 22.15 KG/M2 | TEMPERATURE: 99.3 F | HEART RATE: 73 BPM | DIASTOLIC BLOOD PRESSURE: 68 MMHG | HEIGHT: 63 IN

## 2020-07-14 DIAGNOSIS — E78.00 PURE HYPERCHOLESTEROLEMIA: Primary | ICD-10-CM

## 2020-07-14 DIAGNOSIS — I73.00 RAYNAUD'S PHENOMENON WITHOUT GANGRENE: ICD-10-CM

## 2020-07-14 DIAGNOSIS — N76.0 ACUTE VAGINITIS: ICD-10-CM

## 2020-07-14 DIAGNOSIS — A60.04 HERPES SIMPLEX VULVOVAGINITIS: ICD-10-CM

## 2020-07-14 PROCEDURE — 87480 CANDIDA DNA DIR PROBE: CPT

## 2020-07-14 PROCEDURE — 87510 GARDNER VAG DNA DIR PROBE: CPT

## 2020-07-14 PROCEDURE — 87660 TRICHOMONAS VAGIN DIR PROBE: CPT

## 2020-07-14 PROCEDURE — 99214 OFFICE O/P EST MOD 30 MIN: CPT | Performed by: FAMILY MEDICINE

## 2020-07-14 RX ORDER — ATORVASTATIN CALCIUM 20 MG/1
20 TABLET, FILM COATED ORAL
Qty: 90 TAB | Refills: 1 | Status: SHIPPED | OUTPATIENT
Start: 2020-07-14 | End: 2021-07-15 | Stop reason: SDUPTHER

## 2020-07-14 RX ORDER — FLUCONAZOLE 150 MG/1
150 TABLET ORAL ONCE
Qty: 1 TAB | Refills: 0 | Status: SHIPPED | OUTPATIENT
Start: 2020-07-14 | End: 2020-07-14

## 2020-07-14 RX ORDER — FAMOTIDINE 20 MG/1
20 TABLET, FILM COATED ORAL
COMMUNITY
End: 2022-07-18 | Stop reason: SDUPTHER

## 2020-07-14 RX ORDER — VALACYCLOVIR HYDROCHLORIDE 1 G/1
TABLET, FILM COATED ORAL
Qty: 45 TAB | Refills: 3 | Status: SHIPPED | OUTPATIENT
Start: 2020-07-14 | End: 2021-07-06

## 2020-07-14 RX ORDER — AMLODIPINE BESYLATE 5 MG/1
5 TABLET ORAL DAILY
Qty: 90 TAB | Refills: 1 | Status: SHIPPED | OUTPATIENT
Start: 2020-07-14 | End: 2021-02-23

## 2020-07-14 SDOH — HEALTH STABILITY: MENTAL HEALTH: HOW OFTEN DO YOU HAVE A DRINK CONTAINING ALCOHOL?: 2-3 TIMES A WEEK

## 2020-07-14 ASSESSMENT — FIBROSIS 4 INDEX: FIB4 SCORE: 1.414213562373095049

## 2020-07-14 ASSESSMENT — PATIENT HEALTH QUESTIONNAIRE - PHQ9: CLINICAL INTERPRETATION OF PHQ2 SCORE: 0

## 2020-07-15 LAB
CANDIDA DNA VAG QL PROBE+SIG AMP: NEGATIVE
G VAGINALIS DNA VAG QL PROBE+SIG AMP: NEGATIVE
T VAGINALIS DNA VAG QL PROBE+SIG AMP: NEGATIVE

## 2020-10-12 ENCOUNTER — APPOINTMENT (RX ONLY)
Dept: URBAN - METROPOLITAN AREA CLINIC 20 | Facility: CLINIC | Age: 75
Setting detail: DERMATOLOGY
End: 2020-10-12

## 2020-10-12 DIAGNOSIS — D18.0 HEMANGIOMA: ICD-10-CM

## 2020-10-12 DIAGNOSIS — L82.1 OTHER SEBORRHEIC KERATOSIS: ICD-10-CM

## 2020-10-12 DIAGNOSIS — Z71.89 OTHER SPECIFIED COUNSELING: ICD-10-CM

## 2020-10-12 DIAGNOSIS — L81.4 OTHER MELANIN HYPERPIGMENTATION: ICD-10-CM

## 2020-10-12 DIAGNOSIS — D22 MELANOCYTIC NEVI: ICD-10-CM

## 2020-10-12 DIAGNOSIS — L82.0 INFLAMED SEBORRHEIC KERATOSIS: ICD-10-CM

## 2020-10-12 PROBLEM — D22.61 MELANOCYTIC NEVI OF RIGHT UPPER LIMB, INCLUDING SHOULDER: Status: ACTIVE | Noted: 2020-10-12

## 2020-10-12 PROBLEM — D18.01 HEMANGIOMA OF SKIN AND SUBCUTANEOUS TISSUE: Status: ACTIVE | Noted: 2020-10-12

## 2020-10-12 PROBLEM — D22.39 MELANOCYTIC NEVI OF OTHER PARTS OF FACE: Status: ACTIVE | Noted: 2020-10-12

## 2020-10-12 PROBLEM — D22.71 MELANOCYTIC NEVI OF RIGHT LOWER LIMB, INCLUDING HIP: Status: ACTIVE | Noted: 2020-10-12

## 2020-10-12 PROBLEM — D22.62 MELANOCYTIC NEVI OF LEFT UPPER LIMB, INCLUDING SHOULDER: Status: ACTIVE | Noted: 2020-10-12

## 2020-10-12 PROBLEM — D22.5 MELANOCYTIC NEVI OF TRUNK: Status: ACTIVE | Noted: 2020-10-12

## 2020-10-12 PROBLEM — D22.72 MELANOCYTIC NEVI OF LEFT LOWER LIMB, INCLUDING HIP: Status: ACTIVE | Noted: 2020-10-12

## 2020-10-12 PROCEDURE — 17110 DESTRUCTION B9 LES UP TO 14: CPT

## 2020-10-12 PROCEDURE — ? COUNSELING

## 2020-10-12 PROCEDURE — ? SUNSCREEN RECOMMENDATIONS

## 2020-10-12 PROCEDURE — ? LIQUID NITROGEN

## 2020-10-12 PROCEDURE — 99214 OFFICE O/P EST MOD 30 MIN: CPT | Mod: 25

## 2020-10-12 ASSESSMENT — LOCATION ZONE DERM
LOCATION ZONE: AXILLAE
LOCATION ZONE: TRUNK
LOCATION ZONE: FACE
LOCATION ZONE: LEG
LOCATION ZONE: ARM

## 2020-10-12 ASSESSMENT — LOCATION DETAILED DESCRIPTION DERM
LOCATION DETAILED: RIGHT PROXIMAL PRETIBIAL REGION
LOCATION DETAILED: LEFT DISTAL POSTERIOR THIGH
LOCATION DETAILED: RIGHT INFERIOR UPPER BACK
LOCATION DETAILED: LEFT RIB CAGE
LOCATION DETAILED: RIGHT DISTAL POSTERIOR THIGH
LOCATION DETAILED: RIGHT MEDIAL SUPERIOR CHEST
LOCATION DETAILED: LEFT INFERIOR UPPER BACK
LOCATION DETAILED: RIGHT INFERIOR FOREHEAD
LOCATION DETAILED: RIGHT INFERIOR MEDIAL UPPER BACK
LOCATION DETAILED: RIGHT RIB CAGE
LOCATION DETAILED: RIGHT DISTAL POSTERIOR UPPER ARM
LOCATION DETAILED: RIGHT INFERIOR CENTRAL MALAR CHEEK
LOCATION DETAILED: RIGHT SUPERIOR UPPER BACK
LOCATION DETAILED: RIGHT SUPERIOR LATERAL UPPER BACK
LOCATION DETAILED: LEFT LATERAL PROXIMAL PRETIBIAL REGION
LOCATION DETAILED: INFERIOR THORACIC SPINE
LOCATION DETAILED: LEFT ANTERIOR PROXIMAL UPPER ARM
LOCATION DETAILED: LEFT PROXIMAL POSTERIOR UPPER ARM
LOCATION DETAILED: RIGHT INFERIOR MEDIAL MIDBACK
LOCATION DETAILED: LEFT VENTRAL PROXIMAL FOREARM
LOCATION DETAILED: LEFT LATERAL UPPER BACK
LOCATION DETAILED: RIGHT ANTERIOR AXILLA
LOCATION DETAILED: LEFT AXILLARY VAULT
LOCATION DETAILED: RIGHT VENTRAL PROXIMAL FOREARM

## 2020-10-12 ASSESSMENT — LOCATION SIMPLE DESCRIPTION DERM
LOCATION SIMPLE: RIGHT ANTERIOR AXILLA
LOCATION SIMPLE: RIGHT BACK
LOCATION SIMPLE: RIGHT CHEEK
LOCATION SIMPLE: LEFT POSTERIOR UPPER ARM
LOCATION SIMPLE: RIGHT FOREHEAD
LOCATION SIMPLE: LEFT UPPER ARM
LOCATION SIMPLE: UPPER BACK
LOCATION SIMPLE: LEFT UPPER BACK
LOCATION SIMPLE: RIGHT PRETIBIAL REGION
LOCATION SIMPLE: RIGHT POSTERIOR THIGH
LOCATION SIMPLE: RIGHT LOWER BACK
LOCATION SIMPLE: RIGHT POSTERIOR UPPER ARM
LOCATION SIMPLE: CHEST
LOCATION SIMPLE: LEFT FOREARM
LOCATION SIMPLE: LEFT POSTERIOR THIGH
LOCATION SIMPLE: LEFT AXILLARY VAULT
LOCATION SIMPLE: RIGHT FOREARM
LOCATION SIMPLE: RIGHT UPPER BACK
LOCATION SIMPLE: LEFT PRETIBIAL REGION
LOCATION SIMPLE: ABDOMEN

## 2020-10-12 NOTE — PROCEDURE: LIQUID NITROGEN
Duration Of Freeze Thaw-Cycle (Seconds): 5-10
Post-Care Instructions: I reviewed with the patient in detail post-care instructions. Patient is to wear sunprotection, and avoid picking at any of the treated lesions. Pt may apply Vaseline to crusted or scabbing areas.
Number Of Freeze-Thaw Cycles: 2 freeze-thaw cycles
Medical Necessity Clause: This procedure was medically necessary because the lesions that were treated were:
Render Post-Care Instructions In Note?: no
Detail Level: Detailed
Consent: The patient's consent was obtained including but not limited to risks of crusting, scabbing, blistering, scarring, darker or lighter pigmentary change, recurrence, incomplete removal and infection.
Medical Necessity Information: It is in your best interest to select a reason for this procedure from the list below. All of these items fulfill various CMS LCD requirements except the new and changing color options.

## 2020-10-27 ENCOUNTER — PATIENT MESSAGE (OUTPATIENT)
Dept: MEDICAL GROUP | Age: 75
End: 2020-10-27

## 2020-10-27 DIAGNOSIS — N64.4 BREAST PAIN IN FEMALE: ICD-10-CM

## 2020-10-27 NOTE — TELEPHONE ENCOUNTER
Please advise pt that mammogram is generally stopped at 75 years old, unless there are concerns. Please ask pt if she has any concerns or symptoms related to her breasts.

## 2021-01-11 DIAGNOSIS — Z23 NEED FOR VACCINATION: ICD-10-CM

## 2021-01-13 ENCOUNTER — PATIENT MESSAGE (OUTPATIENT)
Dept: MEDICAL GROUP | Age: 76
End: 2021-01-13

## 2021-01-21 ENCOUNTER — IMMUNIZATION (OUTPATIENT)
Dept: FAMILY PLANNING/WOMEN'S HEALTH CLINIC | Facility: IMMUNIZATION CENTER | Age: 76
End: 2021-01-21
Attending: INTERNAL MEDICINE
Payer: MEDICARE

## 2021-01-21 DIAGNOSIS — Z23 ENCOUNTER FOR VACCINATION: Primary | ICD-10-CM

## 2021-01-21 DIAGNOSIS — Z23 NEED FOR VACCINATION: ICD-10-CM

## 2021-01-21 PROCEDURE — 91300 PFIZER SARS-COV-2 VACCINE: CPT

## 2021-01-21 PROCEDURE — 0001A PFIZER SARS-COV-2 VACCINE: CPT

## 2021-02-11 ENCOUNTER — IMMUNIZATION (OUTPATIENT)
Dept: FAMILY PLANNING/WOMEN'S HEALTH CLINIC | Facility: IMMUNIZATION CENTER | Age: 76
End: 2021-02-11
Attending: INTERNAL MEDICINE
Payer: MEDICARE

## 2021-02-11 DIAGNOSIS — Z23 ENCOUNTER FOR VACCINATION: Primary | ICD-10-CM

## 2021-02-11 PROCEDURE — 0002A PFIZER SARS-COV-2 VACCINE: CPT

## 2021-02-11 PROCEDURE — 91300 PFIZER SARS-COV-2 VACCINE: CPT

## 2021-02-23 DIAGNOSIS — I73.00 RAYNAUD'S PHENOMENON WITHOUT GANGRENE: ICD-10-CM

## 2021-02-23 RX ORDER — AMLODIPINE BESYLATE 5 MG/1
TABLET ORAL
Qty: 90 TABLET | Refills: 1 | Status: SHIPPED | OUTPATIENT
Start: 2021-02-23 | End: 2021-07-06

## 2021-04-01 ENCOUNTER — HOSPITAL ENCOUNTER (OUTPATIENT)
Dept: RADIOLOGY | Facility: MEDICAL CENTER | Age: 76
End: 2021-04-01
Attending: FAMILY MEDICINE
Payer: MEDICARE

## 2021-04-01 DIAGNOSIS — Z12.31 VISIT FOR SCREENING MAMMOGRAM: ICD-10-CM

## 2021-04-01 PROCEDURE — 77063 BREAST TOMOSYNTHESIS BI: CPT

## 2021-04-29 ENCOUNTER — PATIENT MESSAGE (OUTPATIENT)
Dept: MEDICAL GROUP | Age: 76
End: 2021-04-29

## 2021-04-29 DIAGNOSIS — M79.641 BILATERAL HAND PAIN: ICD-10-CM

## 2021-04-29 DIAGNOSIS — M79.642 BILATERAL HAND PAIN: ICD-10-CM

## 2021-07-06 DIAGNOSIS — I73.00 RAYNAUD'S PHENOMENON WITHOUT GANGRENE: ICD-10-CM

## 2021-07-06 DIAGNOSIS — A60.04 HERPES SIMPLEX VULVOVAGINITIS: ICD-10-CM

## 2021-07-06 DIAGNOSIS — E78.00 PURE HYPERCHOLESTEROLEMIA: ICD-10-CM

## 2021-07-06 RX ORDER — AMLODIPINE BESYLATE 5 MG/1
TABLET ORAL
Qty: 30 TABLET | Refills: 0 | Status: SHIPPED | OUTPATIENT
Start: 2021-07-06 | End: 2021-07-15 | Stop reason: SDUPTHER

## 2021-07-06 RX ORDER — VALACYCLOVIR HYDROCHLORIDE 1 G/1
TABLET, FILM COATED ORAL
Qty: 45 TABLET | Refills: 0 | Status: SHIPPED | OUTPATIENT
Start: 2021-07-06 | End: 2021-07-15 | Stop reason: SDUPTHER

## 2021-07-06 NOTE — TELEPHONE ENCOUNTER
Phone Number Called: [phone] (home) [phone] (work)      Call outcome: Spoke to patient regarding message below.    Message: appt scheduled

## 2021-07-06 NOTE — TELEPHONE ENCOUNTER
Please schedule appointment for AWV and medication refills. Please advise pt to do fasting blood tests prior to next OV.   Johana Felix M.D.

## 2021-07-12 ENCOUNTER — HOSPITAL ENCOUNTER (OUTPATIENT)
Dept: LAB | Facility: MEDICAL CENTER | Age: 76
End: 2021-07-12
Attending: FAMILY MEDICINE
Payer: MEDICARE

## 2021-07-12 DIAGNOSIS — E78.00 PURE HYPERCHOLESTEROLEMIA: ICD-10-CM

## 2021-07-12 LAB
ALBUMIN SERPL BCP-MCNC: 3.9 G/DL (ref 3.2–4.9)
ALBUMIN/GLOB SERPL: 1.4 G/DL
ALP SERPL-CCNC: 68 U/L (ref 30–99)
ALT SERPL-CCNC: <5 U/L (ref 2–50)
ANION GAP SERPL CALC-SCNC: 8 MMOL/L (ref 7–16)
AST SERPL-CCNC: 12 U/L (ref 12–45)
BASOPHILS # BLD AUTO: 0.9 % (ref 0–1.8)
BASOPHILS # BLD: 0.04 K/UL (ref 0–0.12)
BILIRUB SERPL-MCNC: 0.3 MG/DL (ref 0.1–1.5)
BUN SERPL-MCNC: 13 MG/DL (ref 8–22)
CALCIUM SERPL-MCNC: 8.8 MG/DL (ref 8.5–10.5)
CHLORIDE SERPL-SCNC: 104 MMOL/L (ref 96–112)
CHOLEST SERPL-MCNC: 157 MG/DL (ref 100–199)
CO2 SERPL-SCNC: 23 MMOL/L (ref 20–33)
CREAT SERPL-MCNC: 0.67 MG/DL (ref 0.5–1.4)
EOSINOPHIL # BLD AUTO: 0.23 K/UL (ref 0–0.51)
EOSINOPHIL NFR BLD: 5.3 % (ref 0–6.9)
ERYTHROCYTE [DISTWIDTH] IN BLOOD BY AUTOMATED COUNT: 46.5 FL (ref 35.9–50)
FASTING STATUS PATIENT QL REPORTED: NORMAL
GLOBULIN SER CALC-MCNC: 2.7 G/DL (ref 1.9–3.5)
GLUCOSE SERPL-MCNC: 92 MG/DL (ref 65–99)
HCT VFR BLD AUTO: 37.1 % (ref 37–47)
HDLC SERPL-MCNC: 59 MG/DL
HGB BLD-MCNC: 12.3 G/DL (ref 12–16)
IMM GRANULOCYTES # BLD AUTO: 0.01 K/UL (ref 0–0.11)
IMM GRANULOCYTES NFR BLD AUTO: 0.2 % (ref 0–0.9)
LDLC SERPL CALC-MCNC: 82 MG/DL
LYMPHOCYTES # BLD AUTO: 1.41 K/UL (ref 1–4.8)
LYMPHOCYTES NFR BLD: 32.2 % (ref 22–41)
MCH RBC QN AUTO: 33.4 PG (ref 27–33)
MCHC RBC AUTO-ENTMCNC: 33.2 G/DL (ref 33.6–35)
MCV RBC AUTO: 100.8 FL (ref 81.4–97.8)
MONOCYTES # BLD AUTO: 0.66 K/UL (ref 0–0.85)
MONOCYTES NFR BLD AUTO: 15.1 % (ref 0–13.4)
NEUTROPHILS # BLD AUTO: 2.03 K/UL (ref 2–7.15)
NEUTROPHILS NFR BLD: 46.3 % (ref 44–72)
NRBC # BLD AUTO: 0 K/UL
NRBC BLD-RTO: 0 /100 WBC
PLATELET # BLD AUTO: 253 K/UL (ref 164–446)
PMV BLD AUTO: 10.1 FL (ref 9–12.9)
POTASSIUM SERPL-SCNC: 4.6 MMOL/L (ref 3.6–5.5)
PROT SERPL-MCNC: 6.6 G/DL (ref 6–8.2)
RBC # BLD AUTO: 3.68 M/UL (ref 4.2–5.4)
SODIUM SERPL-SCNC: 135 MMOL/L (ref 135–145)
TRIGL SERPL-MCNC: 80 MG/DL (ref 0–149)
WBC # BLD AUTO: 4.4 K/UL (ref 4.8–10.8)

## 2021-07-12 PROCEDURE — 80053 COMPREHEN METABOLIC PANEL: CPT

## 2021-07-12 PROCEDURE — 85025 COMPLETE CBC W/AUTO DIFF WBC: CPT

## 2021-07-12 PROCEDURE — 80061 LIPID PANEL: CPT

## 2021-07-12 PROCEDURE — 36415 COLL VENOUS BLD VENIPUNCTURE: CPT

## 2021-07-12 SDOH — ECONOMIC STABILITY: HOUSING INSECURITY
IN THE LAST 12 MONTHS, WAS THERE A TIME WHEN YOU DID NOT HAVE A STEADY PLACE TO SLEEP OR SLEPT IN A SHELTER (INCLUDING NOW)?: NO

## 2021-07-12 SDOH — ECONOMIC STABILITY: FOOD INSECURITY: WITHIN THE PAST 12 MONTHS, THE FOOD YOU BOUGHT JUST DIDN'T LAST AND YOU DIDN'T HAVE MONEY TO GET MORE.: NEVER TRUE

## 2021-07-12 SDOH — ECONOMIC STABILITY: FOOD INSECURITY: WITHIN THE PAST 12 MONTHS, YOU WORRIED THAT YOUR FOOD WOULD RUN OUT BEFORE YOU GOT MONEY TO BUY MORE.: NEVER TRUE

## 2021-07-12 SDOH — HEALTH STABILITY: MENTAL HEALTH
STRESS IS WHEN SOMEONE FEELS TENSE, NERVOUS, ANXIOUS, OR CAN'T SLEEP AT NIGHT BECAUSE THEIR MIND IS TROUBLED. HOW STRESSED ARE YOU?: NOT AT ALL

## 2021-07-12 SDOH — ECONOMIC STABILITY: TRANSPORTATION INSECURITY
IN THE PAST 12 MONTHS, HAS THE LACK OF TRANSPORTATION KEPT YOU FROM MEDICAL APPOINTMENTS OR FROM GETTING MEDICATIONS?: NO

## 2021-07-12 SDOH — ECONOMIC STABILITY: INCOME INSECURITY: IN THE LAST 12 MONTHS, WAS THERE A TIME WHEN YOU WERE NOT ABLE TO PAY THE MORTGAGE OR RENT ON TIME?: NO

## 2021-07-12 SDOH — ECONOMIC STABILITY: INCOME INSECURITY: HOW HARD IS IT FOR YOU TO PAY FOR THE VERY BASICS LIKE FOOD, HOUSING, MEDICAL CARE, AND HEATING?: NOT HARD AT ALL

## 2021-07-12 SDOH — HEALTH STABILITY: PHYSICAL HEALTH: ON AVERAGE, HOW MANY MINUTES DO YOU ENGAGE IN EXERCISE AT THIS LEVEL?: 60 MIN

## 2021-07-12 SDOH — HEALTH STABILITY: PHYSICAL HEALTH: ON AVERAGE, HOW MANY DAYS PER WEEK DO YOU ENGAGE IN MODERATE TO STRENUOUS EXERCISE (LIKE A BRISK WALK)?: 6 DAYS

## 2021-07-12 SDOH — ECONOMIC STABILITY: HOUSING INSECURITY: IN THE LAST 12 MONTHS, HOW MANY PLACES HAVE YOU LIVED?: 1

## 2021-07-12 SDOH — ECONOMIC STABILITY: TRANSPORTATION INSECURITY
IN THE PAST 12 MONTHS, HAS LACK OF TRANSPORTATION KEPT YOU FROM MEETINGS, WORK, OR FROM GETTING THINGS NEEDED FOR DAILY LIVING?: NO

## 2021-07-12 SDOH — ECONOMIC STABILITY: TRANSPORTATION INSECURITY
IN THE PAST 12 MONTHS, HAS LACK OF RELIABLE TRANSPORTATION KEPT YOU FROM MEDICAL APPOINTMENTS, MEETINGS, WORK OR FROM GETTING THINGS NEEDED FOR DAILY LIVING?: NO

## 2021-07-12 ASSESSMENT — SOCIAL DETERMINANTS OF HEALTH (SDOH)
HOW OFTEN DO YOU GET TOGETHER WITH FRIENDS OR RELATIVES?: MORE THAN THREE TIMES A WEEK
HOW OFTEN DO YOU ATTENT MEETINGS OF THE CLUB OR ORGANIZATION YOU BELONG TO?: MORE THAN 4 TIMES PER YEAR
HOW OFTEN DO YOU ATTEND CHURCH OR RELIGIOUS SERVICES?: MORE THAN 4 TIMES PER YEAR
HOW OFTEN DO YOU ATTENT MEETINGS OF THE CLUB OR ORGANIZATION YOU BELONG TO?: MORE THAN 4 TIMES PER YEAR
DO YOU BELONG TO ANY CLUBS OR ORGANIZATIONS SUCH AS CHURCH GROUPS UNIONS, FRATERNAL OR ATHLETIC GROUPS, OR SCHOOL GROUPS?: YES
HOW OFTEN DO YOU ATTEND CHURCH OR RELIGIOUS SERVICES?: MORE THAN 4 TIMES PER YEAR
HOW HARD IS IT FOR YOU TO PAY FOR THE VERY BASICS LIKE FOOD, HOUSING, MEDICAL CARE, AND HEATING?: NOT HARD AT ALL
HOW OFTEN DO YOU HAVE A DRINK CONTAINING ALCOHOL: 2-3 TIMES A WEEK
IN A TYPICAL WEEK, HOW MANY TIMES DO YOU TALK ON THE PHONE WITH FAMILY, FRIENDS, OR NEIGHBORS?: MORE THAN THREE TIMES A WEEK
DO YOU BELONG TO ANY CLUBS OR ORGANIZATIONS SUCH AS CHURCH GROUPS UNIONS, FRATERNAL OR ATHLETIC GROUPS, OR SCHOOL GROUPS?: YES
HOW OFTEN DO YOU GET TOGETHER WITH FRIENDS OR RELATIVES?: MORE THAN THREE TIMES A WEEK
HOW OFTEN DO YOU HAVE SIX OR MORE DRINKS ON ONE OCCASION: NEVER
HOW MANY DRINKS CONTAINING ALCOHOL DO YOU HAVE ON A TYPICAL DAY WHEN YOU ARE DRINKING: 1 OR 2
IN A TYPICAL WEEK, HOW MANY TIMES DO YOU TALK ON THE PHONE WITH FAMILY, FRIENDS, OR NEIGHBORS?: MORE THAN THREE TIMES A WEEK
WITHIN THE PAST 12 MONTHS, YOU WORRIED THAT YOUR FOOD WOULD RUN OUT BEFORE YOU GOT THE MONEY TO BUY MORE: NEVER TRUE

## 2021-07-12 ASSESSMENT — LIFESTYLE VARIABLES
HOW MANY STANDARD DRINKS CONTAINING ALCOHOL DO YOU HAVE ON A TYPICAL DAY: 1 OR 2
HOW OFTEN DO YOU HAVE SIX OR MORE DRINKS ON ONE OCCASION: NEVER
HOW OFTEN DO YOU HAVE A DRINK CONTAINING ALCOHOL: 2-3 TIMES A WEEK

## 2021-07-14 NOTE — LETTER
[facility]  Johana Felix M.D.  [address] [place], [place]   [place] [address]  Fax: [phone]   Authorization for Release/Disclosure of   Protected Health Information   Name: KATERINA BRAN : 1945 SSN: [id number]   Address: [address] [place] [address]  [place] [address] Phone:    [phone] (home)    I authorize the entity listed below to release/disclose the PHI below to:   [facility]/Johana Felix M.D. and Johana Felix M.D.   Provider or Entity Name:  Dr. Beck Ralph M.D.   Address   [facility], [facility], [facility]   Phone:      Fax:  [phone]   Reason for request: continuity of care   Information to be released:    [  ] LAST COLONOSCOPY,  including any PATH REPORT and follow-up  [  ] LAST FIT/COLOGUARD RESULT [  ] LAST DEXA  [  ] LAST MAMMOGRAM  [  ] LAST PAP  [  ] LAST LABS [  ] RETINA EXAM REPORT  [  ] IMMUNIZATION RECORDS  [XXX] Release all info      [  ] Check here and initial the line next to each item to release ALL health information INCLUDING  _____ Care and treatment for drug and / or alcohol abuse  _____ HIV testing, infection status, or AIDS  _____ Genetic Testing    DATES OF SERVICE OR TIME PERIOD TO BE DISCLOSED: _____________  I understand and acknowledge that:  * This Authorization may be revoked at any time by you in writing, except if your health information has already been used or disclosed.  * Your health information that will be used or disclosed as a result of you signing this authorization could be re-disclosed by the recipient. If this occurs, your re-disclosed health information may no longer be protected by State or Federal laws.  * You may refuse to sign this Authorization. Your refusal will not affect your ability to obtain treatment.  * This Authorization becomes effective upon signing and will  on (date) __________.      If no date is indicated, this Authorization will  one (1) year from the signature date.    Name: Katerina Bran    Signature: Continuity of Care   Date:          7/15/2021       PLEASE FAX REQUESTED RECORDS BACK TO: [phone]

## 2021-07-14 NOTE — PROGRESS NOTES
ANNUAL WELLNESS VISIT PRE-VISIT PLANNING    Thursday, 07/15/2021@8:07AM:  1. Caller Name: Hermelinda Aliya Burton      Call Back Number: [phone] (home)    Spoke with patient & Pre-Visit Planning Completed    2nd attempt: Wednesday, 7/14/21@2:45PM:  Phone Number Called: [phone] (home)   Call outcome: No answer. Left Voice Mail.  Message: Request call back. Need to complete Pre-Visit Planning.     1st attempt:Wednesday, 07/14/2021@1:16PM:  Phone Number Called: [phone] (home)   Call outcome: No answer. Left Voice Mail.  Message: Advised office visit scheduled Thursday, 07/15/21@ 11:30AM, [address]. Request call back. Need to complete Pre-Visit Planning.    1.  Reviewed notes from the last office visit: Yes    2.  If any orders were ordered or intended to be done prior to visit (i.e. 6 mos follow-up), do we have results/consult notes or has patient scheduled?        •  Labs - Labs ordered, completed on 07/12/2021 and results are in chart.  Note: If patient appointment is for lab review and patient did not complete labs, check with provider if OK to reschedule patient until labs completed.       •  Imaging - Imaging was not ordered at last office visit.       •  Referrals - No referrals were ordered at last office visit.    3.  Immunizations were updated in Epic using Reconcile Outside Information activity? Yes       •  Is patient due for Tdap? NO       •  Is patient due for Shingrix? NO    4.  Patient is due for the following Health Maintenance Topics:   Health Maintenance Due   Topic Date Due   • Annual Wellness Visit  07/10/2019     5.  Reviewed/Updated the following with patient:       •   Preferred Pharmacy? Yes       •   Preferred Lab? Yes       •   Preferred Communication? Yes       •   Allergies? Yes       •   Medications? Yes       •   Social History? Yes       •   Family History (document living status of immediate family members and if + hx of  cancer, diabetes, hypertension, hyperlipidemia,  heart attack, stroke) Yes    6.  Care Team Updated:       •   DME Company (gait device, O2, CPAP, etc.):  None per patient       •   Other Specialists (eye doctor, derm, GYN, cardiology, endo, etc):   Ophthalmology: Dr. Beck Hartmann M.D. Care Teams updated & Medical records requested.    7.  Patient WAS advised: “This is a free wellness visit. The provider will screen for medical conditions to help you stay healthy. If you have other concerns to address you may be asked to discuss these at a separate visit or there may be an additional fee.”     8.  AHA (Puls8) form printed for Provider? N/A

## 2021-07-15 ENCOUNTER — OFFICE VISIT (OUTPATIENT)
Dept: MEDICAL GROUP | Age: 76
End: 2021-07-15
Payer: MEDICARE

## 2021-07-15 VITALS
WEIGHT: 122 LBS | HEART RATE: 80 BPM | BODY MASS INDEX: 21.62 KG/M2 | TEMPERATURE: 97.4 F | HEIGHT: 63 IN | OXYGEN SATURATION: 95 % | DIASTOLIC BLOOD PRESSURE: 64 MMHG | SYSTOLIC BLOOD PRESSURE: 124 MMHG

## 2021-07-15 DIAGNOSIS — Z00.00 MEDICARE ANNUAL WELLNESS VISIT, SUBSEQUENT: Primary | ICD-10-CM

## 2021-07-15 DIAGNOSIS — M15.9 GENERALIZED OSTEOARTHRITIS: ICD-10-CM

## 2021-07-15 DIAGNOSIS — K21.9 GASTROESOPHAGEAL REFLUX DISEASE WITHOUT ESOPHAGITIS: ICD-10-CM

## 2021-07-15 DIAGNOSIS — M85.89 OSTEOPENIA OF MULTIPLE SITES: ICD-10-CM

## 2021-07-15 DIAGNOSIS — E78.00 PURE HYPERCHOLESTEROLEMIA: ICD-10-CM

## 2021-07-15 DIAGNOSIS — A60.04 HERPES SIMPLEX VULVOVAGINITIS: ICD-10-CM

## 2021-07-15 DIAGNOSIS — I73.00 RAYNAUD'S PHENOMENON WITHOUT GANGRENE: ICD-10-CM

## 2021-07-15 DIAGNOSIS — G50.0 TRIGEMINAL NEURALGIA: ICD-10-CM

## 2021-07-15 DIAGNOSIS — Z23 NEED FOR VACCINATION: ICD-10-CM

## 2021-07-15 PROCEDURE — G0439 PPPS, SUBSEQ VISIT: HCPCS | Performed by: FAMILY MEDICINE

## 2021-07-15 RX ORDER — FLUTICASONE PROPIONATE 50 MCG
1 SPRAY, SUSPENSION (ML) NASAL DAILY
COMMUNITY
End: 2021-07-15 | Stop reason: SDUPTHER

## 2021-07-15 RX ORDER — AMLODIPINE BESYLATE 5 MG/1
5 TABLET ORAL
Qty: 90 TABLET | Refills: 3 | Status: SHIPPED | OUTPATIENT
Start: 2021-07-15 | End: 2022-07-18 | Stop reason: SDUPTHER

## 2021-07-15 RX ORDER — ALENDRONATE SODIUM 70 MG/1
70 TABLET ORAL
Qty: 12 TABLET | Refills: 3 | Status: SHIPPED | OUTPATIENT
Start: 2021-07-15 | End: 2022-07-18 | Stop reason: SDUPTHER

## 2021-07-15 RX ORDER — ATORVASTATIN CALCIUM 20 MG/1
20 TABLET, FILM COATED ORAL
Qty: 90 TABLET | Refills: 3 | Status: SHIPPED | OUTPATIENT
Start: 2021-07-15 | End: 2022-07-18 | Stop reason: SDUPTHER

## 2021-07-15 RX ORDER — VALACYCLOVIR HYDROCHLORIDE 1 G/1
TABLET, FILM COATED ORAL
Qty: 45 TABLET | Refills: 3 | Status: SHIPPED | OUTPATIENT
Start: 2021-07-15 | End: 2022-07-18 | Stop reason: SDUPTHER

## 2021-07-15 RX ORDER — FLUTICASONE PROPIONATE 50 MCG
1 SPRAY, SUSPENSION (ML) NASAL DAILY
Qty: 48 G | Refills: 3 | Status: SHIPPED | OUTPATIENT
Start: 2021-07-15 | End: 2022-07-18 | Stop reason: SDUPTHER

## 2021-07-15 RX ORDER — GABAPENTIN 100 MG/1
100 CAPSULE ORAL 3 TIMES DAILY
Qty: 270 CAPSULE | Refills: 0 | Status: SHIPPED | OUTPATIENT
Start: 2021-07-15 | End: 2021-10-25

## 2021-07-15 ASSESSMENT — PATIENT HEALTH QUESTIONNAIRE - PHQ9: CLINICAL INTERPRETATION OF PHQ2 SCORE: 0

## 2021-07-15 ASSESSMENT — ENCOUNTER SYMPTOMS: GENERAL WELL-BEING: GOOD

## 2021-07-15 ASSESSMENT — ACTIVITIES OF DAILY LIVING (ADL): BATHING_REQUIRES_ASSISTANCE: 0

## 2021-07-15 ASSESSMENT — FIBROSIS 4 INDEX: FIB4 SCORE: 1.72

## 2021-07-15 NOTE — PROGRESS NOTES
Chief Complaint   Patient presents with   • Medicare Annual Wellness       HPI:  Hermelinda is a 76 y.o. here for Medicare Annual Wellness Visit    Pt complained of worsening arthritis in hands, ankles, lower back. She has been taking Tylenol twice daily, which was helping in the past, the effects of Tylenol is weaning. She suffers worsening pain. She is very active. She swims, walks, plays golf, and does yoga regularly. She continues to power through pain, but states that symptoms start to become more bothersome.     She was recently diagnosed with right-sided trigeminal neuralgia. She tried Tegretol and Gabapentin but unable to tolerate side effects of nausea, hypersomnolence, weakness. Once time, she suffers acute, severe symptoms of right-side facial pain after exposure to cold wind. She takes her 's Gabapentin (300 mg) which helps significantly with her symptoms.     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     • alendronate (FOSAMAX) 70 MG Tab Take 1 tablet by mouth every 7 days. 12 tablet 3   • amLODIPine (NORVASC) 5 MG Tab Take 1 tablet by mouth every day. 90 tablet 3   • fluticasone (FLONASE) 50 MCG/ACT nasal spray Administer 1 Spray into affected nostril(S) every day. 48 g 3   • atorvastatin (LIPITOR) 20 MG Tab Take 1 tablet by mouth every day. 90 tablet 3   • gabapentin (NEURONTIN) 100 MG Cap Take 1 capsule by mouth 3 times a day. 270 capsule 0   • diclofenac sodium 1 % Gel Apply to 2-4 gram to affected area 4 times daily 100 g 5   • valacyclovir (VALTREX) 1 GM Tab Take 0.5  tablet once daily 45 tablet 3   • famotidine (PEPCID) 20 MG Tab Take 20 mg by mouth 1 time daily as needed.     • Cholecalciferol (VITAMIN D) 2000 UNIT Tab Take  by mouth every day.     • CALCIUM 500 + D PO Take  by mouth. 2 daily       No current facility-administered medications for this visit.        Patient is taking medications as noted in medication list.  Current supplements as per medication list.     Allergies: Carbamazepine, Nabumetone, Tape, and Tramadol    Current social contact/activities: pt involved in a lot of social acitivities     Is patient current with immunizations? Yes.    She  reports that she has never smoked. She has never used smokeless tobacco. She reports current alcohol use of about 2.4 oz of alcohol per week. She reports that she does not use drugs.  Counseling given: Not Answered      DPA/Advanced directive: Patient has Advanced Directive, but it is not on file. Instructed to bring in a copy to scan into their chart.    ROS:    Gait: Uses no assistive device   Ostomy: No   Other tubes: No   Amputations: No   Chronic oxygen use No   Last eye exam last Aug. Next appt already scheduled  Wears hearing aids: Yes   : Denies any urinary leakage during the last 6 months    Screening:    Depression Screening  Little interest or pleasure in doing things?  0 - not at all  Feeling down, depressed, or hopeless? 0 - not at all     Screening for Cognitive Impairment  Three Minute Recall (captain, magdalena, picture)  3/3    Draw clock face with all 12 numbers and set the hands to show 5 past 8.  Yes      Fall Risk Assessment  Has the patient had two or more falls in the last year or any fall with injury in the last year?  No  If fall risk identified, deferred for follow up unless specifically addressed in assessment and plan.    Safety Assessment  Throw rugs on floor.  No  Handrails on all stairs.  Yes  Good lighting in all hallways.  Yes  Difficulty hearing.  No  Patient counseled about all safety  risks that were identified.    Functional Assessment ADLs  Are there any barriers preventing you from cooking for yourself or meeting nutritional needs?  No.    Are there any barriers preventing you from driving safely or obtaining transportation?  No.    Are there any barriers preventing you from using a telephone or calling for help?  No.    Are there any barriers preventing you from shopping?  No.    Are there any barriers preventing you from taking care of your own finances?  No.    Are there any barriers preventing you from managing your medications?    No.    Are there any barriers preventing you from showering, bathing or dressing yourself?  No.    Are you currently engaging in any exercise or physical activity?  Yes.     What is your perception of your health?  Good.    Health Maintenance Summary                IMM INFLUENZA Next Due 9/1/2021      Done 8/25/2020 Imm Admin: Influenza Vaccine Adult HD     Patient has more history with this topic...    BONE DENSITY Next Due 2/5/2022      Done 2/5/2020 DS-BONE DENSITY STUDY (DEXA)     Patient has more history with this topic...    Annual Wellness Visit Next Due 7/16/2022      Done 7/15/2021 SUBSEQUENT ANNUAL WELLNESS VISIT-INCLUDES PPPS ()     Patient has more history with this topic...    IMM DTaP/Tdap/Td Vaccine Next Due 5/25/2031      Done 5/25/2021 Imm Admin: Tdap Vaccine     Patient has more history with this topic...          Patient Care Team:  Johana Felix M.D. as PCP - General (Family Medicine)  JENI Hightower as Consulting Physician (OB/Gyn)  Ar Diaz M.D. as Consulting Physician (Orthopaedics)  Pop Bobby Jr., M.D. as Consulting Physician (Gastroenterology)  HELADIO CroftPNIELS as Consulting Physician (Dermatology)  Pankaj Beal M.D. (Orthopaedics)  JENI Garcia (Inactive) (Orthopaedics)  Beck Ralph M.D. as Attending Team Physician (Ophthalmology)    Social History     Tobacco Use   • Smoking status: Never  Smoker   • Smokeless tobacco: Never Used   Vaping Use   • Vaping Use: Never used   Substance Use Topics   • Alcohol use: Yes     Alcohol/week: 2.4 oz     Types: 4 Glasses of wine per week     Comment: 2-4 per week   • Drug use: No     Family History   Problem Relation Age of Onset   • Heart Disease Father         d. 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   Past Surgical History:   Procedure Laterality Date   • ANKLE ORIF  1/6/15    [facility]   • KNEE ARTHROSCOPY  12/29/2011    Performed by BRADFORD WEBB at SURGERY [facility] ORS   • MEDIAL MENISCECTOMY  12/29/2011    Performed by BRADFORD WEBB at [facility] ORS   • HYSTERECTOMY, VAGINAL  1985   • APPENDECTOMY  1980    was in [place]   • LAPAROTOMY  1980    excision uterine fibroid   • HARDWARE REMOVAL ORTHO  1979    left ring finger   • HAND SURGERY  1978    ORIF left ring finger   • CATARACT EXTRACTION WITH IOL  2007, 2008    Bilateral   • TONSILLECTOMY AND ADENOIDECTOMY  as child         Exam:   /64 (BP Location: Right arm, Patient Position: Sitting, BP Cuff Size: Adult)  "  Pulse 80   Temp 36.3 °C (97.4 °F) (Temporal)   Ht 1.588 m (5' 2.5\")   Wt 55.3 kg (122 lb)   SpO2 95%  Body mass index is 21.96 kg/m².    Hearing excellent.    Dentition good  Alert, oriented in no acute distress.  Eye contact is good, speech goal directed, affect calm      Assessment and Plan. The following treatment and monitoring plan is recommended:      1. Osteopenia of multiple sites  DEXA done in 2020 showed advanced osteopenia. She used to take Fosamax 70 mg weekly from 2015 to 2018, but discontinued due to worsening joint pain. However, joint pain remains constant without medications. She denies hx of fall or bone fracture. She is taking calcium and vitamin D. I discussed risks and benefits of treatment. She wants to restart Fosamax.   - alendronate (FOSAMAX) 70 MG Tab; Take 1 tablet by mouth every 7 days.  Dispense: 12 tablet; Refill: 3  - Calcium and Vitamin D  - Weight-bearing aerobic and strengthening exercises: handout provided.   - Limit caffeine intake to 2 or fewer servings per day  - Limit alcohol consumption to one drink per day  - pt was counseled on fall risk prevention       2. Pure hypercholesterolemia  Chronic, controlled with Lipitor 20 mg qd, no s/e reported, will continue.    - atorvastatin (LIPITOR) 20 MG Tab; Take 1 tablet by mouth every day.  Dispense: 90 tablet; Refill: 3    3. Trigeminal neuralgia_R  She was recently diagnosed with right-sided trigeminal neuralgia by Dr. Mitchell. She tried high dose Gabapentin and Tegretol but unable to tolerate side effects. Will start trial of low dose of Gabapentin. Slow up tiration recommended and discussed.   - gabapentin (NEURONTIN) 100 MG Cap; Take 1 capsule by mouth 3 times a day.  Dispense: 270 capsule; Refill: 0    4. Generalized osteoarthritis  - cont Tylenol 500 mg TID   - diclofenac sodium 1 % Gel; Apply to 2-4 gram to affected area 4 times daily  Dispense: 100 g; Refill: 5  - rehab exercises for back, hand, and ankle provided.   - " activity modification, keep normal BMI    5. Gastroesophageal reflux disease without esophagitis  Controlled with Pepcid 20 mg qhs PRN     6. Herpes simplex vulvovaginitis  She takes Valtrex 500 mg qd for chronic suppression.     7. Raynaud's phenomenon without gangrene  - amLODIPine (NORVASC) 5 MG Tab; Take 1 tablet by mouth every day.  Dispense: 90 tablet; Refill: 3    8. Medicare annual wellness visit, subsequent  General health and wellness counseling provided.          Services suggested: No services needed at this time  Health Care Screening recommendations as per orders if indicated.  Referrals offered: PT/OT/Nutrition counseling/Behavioral Health/Smoking cessation as per orders if indicated.    Discussion today about general wellness and lifestyle habits:    · Prevent falls and reduce trip hazards; Cautioned about securing or removing rugs.  · Have a working fire alarm and carbon monoxide detector;   · Engage in regular physical activity and social activities     Follow-up: Return in about 6 months (around 1/15/2022) for Multiple issues.

## 2021-10-13 ENCOUNTER — OFFICE VISIT (OUTPATIENT)
Dept: MEDICAL GROUP | Age: 76
End: 2021-10-13
Payer: MEDICARE

## 2021-10-13 ENCOUNTER — HOSPITAL ENCOUNTER (OUTPATIENT)
Facility: MEDICAL CENTER | Age: 76
End: 2021-10-13
Attending: INTERNAL MEDICINE
Payer: MEDICARE

## 2021-10-13 ENCOUNTER — APPOINTMENT (RX ONLY)
Dept: URBAN - METROPOLITAN AREA CLINIC 20 | Facility: CLINIC | Age: 76
Setting detail: DERMATOLOGY
End: 2021-10-13

## 2021-10-13 VITALS
OXYGEN SATURATION: 98 % | WEIGHT: 124.4 LBS | SYSTOLIC BLOOD PRESSURE: 126 MMHG | HEIGHT: 62 IN | TEMPERATURE: 97.8 F | HEART RATE: 88 BPM | DIASTOLIC BLOOD PRESSURE: 72 MMHG | BODY MASS INDEX: 22.89 KG/M2

## 2021-10-13 DIAGNOSIS — R10.31 ABDOMINAL PAIN, ACUTE, RIGHT LOWER QUADRANT: ICD-10-CM

## 2021-10-13 DIAGNOSIS — L81.4 OTHER MELANIN HYPERPIGMENTATION: ICD-10-CM

## 2021-10-13 DIAGNOSIS — D18.0 HEMANGIOMA: ICD-10-CM

## 2021-10-13 DIAGNOSIS — R30.0 DYSURIA: ICD-10-CM

## 2021-10-13 DIAGNOSIS — L82.0 INFLAMED SEBORRHEIC KERATOSIS: ICD-10-CM

## 2021-10-13 DIAGNOSIS — L82.1 OTHER SEBORRHEIC KERATOSIS: ICD-10-CM

## 2021-10-13 DIAGNOSIS — Z71.89 OTHER SPECIFIED COUNSELING: ICD-10-CM

## 2021-10-13 DIAGNOSIS — D22 MELANOCYTIC NEVI: ICD-10-CM

## 2021-10-13 DIAGNOSIS — L57.0 ACTINIC KERATOSIS: ICD-10-CM

## 2021-10-13 PROBLEM — D22.71 MELANOCYTIC NEVI OF RIGHT LOWER LIMB, INCLUDING HIP: Status: ACTIVE | Noted: 2021-10-13

## 2021-10-13 PROBLEM — D22.62 MELANOCYTIC NEVI OF LEFT UPPER LIMB, INCLUDING SHOULDER: Status: ACTIVE | Noted: 2021-10-13

## 2021-10-13 PROBLEM — D22.39 MELANOCYTIC NEVI OF OTHER PARTS OF FACE: Status: ACTIVE | Noted: 2021-10-13

## 2021-10-13 PROBLEM — D22.72 MELANOCYTIC NEVI OF LEFT LOWER LIMB, INCLUDING HIP: Status: ACTIVE | Noted: 2021-10-13

## 2021-10-13 PROBLEM — D22.61 MELANOCYTIC NEVI OF RIGHT UPPER LIMB, INCLUDING SHOULDER: Status: ACTIVE | Noted: 2021-10-13

## 2021-10-13 PROBLEM — D22.5 MELANOCYTIC NEVI OF TRUNK: Status: ACTIVE | Noted: 2021-10-13

## 2021-10-13 PROBLEM — D18.01 HEMANGIOMA OF SKIN AND SUBCUTANEOUS TISSUE: Status: ACTIVE | Noted: 2021-10-13

## 2021-10-13 LAB
APPEARANCE UR: CLEAR
BILIRUB UR STRIP-MCNC: NORMAL MG/DL
COLOR UR AUTO: YELLOW
GLUCOSE UR STRIP.AUTO-MCNC: NORMAL MG/DL
KETONES UR STRIP.AUTO-MCNC: NORMAL MG/DL
LEUKOCYTE ESTERASE UR QL STRIP.AUTO: NORMAL
NITRITE UR QL STRIP.AUTO: NORMAL
PH UR STRIP.AUTO: NORMAL [PH] (ref 5–8)
PROT UR QL STRIP: 6.5 MG/DL
RBC UR QL AUTO: NORMAL
SP GR UR STRIP.AUTO: 1.02
UROBILINOGEN UR STRIP-MCNC: NORMAL MG/DL

## 2021-10-13 PROCEDURE — ? SUNSCREEN RECOMMENDATIONS

## 2021-10-13 PROCEDURE — ? LIQUID NITROGEN

## 2021-10-13 PROCEDURE — 99213 OFFICE O/P EST LOW 20 MIN: CPT | Mod: 25

## 2021-10-13 PROCEDURE — 87086 URINE CULTURE/COLONY COUNT: CPT

## 2021-10-13 PROCEDURE — ? OBSERVATION AND MEASURE

## 2021-10-13 PROCEDURE — 99214 OFFICE O/P EST MOD 30 MIN: CPT | Performed by: INTERNAL MEDICINE

## 2021-10-13 PROCEDURE — 17000 DESTRUCT PREMALG LESION: CPT

## 2021-10-13 PROCEDURE — ? COUNSELING

## 2021-10-13 PROCEDURE — 81002 URINALYSIS NONAUTO W/O SCOPE: CPT | Performed by: INTERNAL MEDICINE

## 2021-10-13 RX ORDER — NITROFURANTOIN 25; 75 MG/1; MG/1
100 CAPSULE ORAL 2 TIMES DAILY
Qty: 20 CAPSULE | Refills: 1 | Status: SHIPPED | OUTPATIENT
Start: 2021-10-13 | End: 2021-10-23

## 2021-10-13 ASSESSMENT — ENCOUNTER SYMPTOMS
CONSTITUTIONAL NEGATIVE: 1
EYES NEGATIVE: 1
ABDOMINAL PAIN: 1
NEUROLOGICAL NEGATIVE: 1
MUSCULOSKELETAL NEGATIVE: 1
RESPIRATORY NEGATIVE: 1
PSYCHIATRIC NEGATIVE: 1
CARDIOVASCULAR NEGATIVE: 1

## 2021-10-13 ASSESSMENT — LOCATION ZONE DERM
LOCATION ZONE: TRUNK
LOCATION ZONE: ARM
LOCATION ZONE: FACE
LOCATION ZONE: LEG

## 2021-10-13 ASSESSMENT — LOCATION SIMPLE DESCRIPTION DERM
LOCATION SIMPLE: RIGHT BREAST
LOCATION SIMPLE: CHEST
LOCATION SIMPLE: RIGHT PRETIBIAL REGION
LOCATION SIMPLE: LEFT FOREARM
LOCATION SIMPLE: LEFT POSTERIOR THIGH
LOCATION SIMPLE: RIGHT FOREARM
LOCATION SIMPLE: RIGHT CHEEK
LOCATION SIMPLE: RIGHT POSTERIOR THIGH
LOCATION SIMPLE: RIGHT THIGH
LOCATION SIMPLE: RIGHT UPPER BACK
LOCATION SIMPLE: RIGHT POSTERIOR UPPER ARM
LOCATION SIMPLE: RIGHT FOREHEAD
LOCATION SIMPLE: UPPER BACK
LOCATION SIMPLE: RIGHT UPPER ARM
LOCATION SIMPLE: RIGHT LOWER BACK
LOCATION SIMPLE: LEFT UPPER ARM
LOCATION SIMPLE: LEFT POSTERIOR UPPER ARM
LOCATION SIMPLE: LEFT PRETIBIAL REGION

## 2021-10-13 ASSESSMENT — LOCATION DETAILED DESCRIPTION DERM
LOCATION DETAILED: RIGHT INFERIOR CENTRAL MALAR CHEEK
LOCATION DETAILED: RIGHT MEDIAL BREAST 3-4:00 REGION
LOCATION DETAILED: RIGHT PROXIMAL PRETIBIAL REGION
LOCATION DETAILED: RIGHT INFERIOR FOREHEAD
LOCATION DETAILED: RIGHT INFERIOR MEDIAL UPPER BACK
LOCATION DETAILED: RIGHT INFERIOR MEDIAL MIDBACK
LOCATION DETAILED: INFERIOR THORACIC SPINE
LOCATION DETAILED: LEFT DISTAL POSTERIOR THIGH
LOCATION DETAILED: RIGHT DISTAL POSTERIOR UPPER ARM
LOCATION DETAILED: LEFT LATERAL PROXIMAL PRETIBIAL REGION
LOCATION DETAILED: MIDDLE STERNUM
LOCATION DETAILED: RIGHT SUPERIOR UPPER BACK
LOCATION DETAILED: RIGHT ANTERIOR DISTAL THIGH
LOCATION DETAILED: LEFT ANTERIOR PROXIMAL UPPER ARM
LOCATION DETAILED: RIGHT DISTAL POSTERIOR THIGH
LOCATION DETAILED: RIGHT DISTAL DORSAL FOREARM
LOCATION DETAILED: RIGHT VENTRAL PROXIMAL FOREARM
LOCATION DETAILED: LEFT PROXIMAL POSTERIOR UPPER ARM
LOCATION DETAILED: LEFT VENTRAL PROXIMAL FOREARM
LOCATION DETAILED: RIGHT ANTERIOR PROXIMAL UPPER ARM
LOCATION DETAILED: LOWER STERNUM
LOCATION DETAILED: RIGHT PROXIMAL POSTERIOR UPPER ARM

## 2021-10-13 ASSESSMENT — FIBROSIS 4 INDEX
FIB4 SCORE: 1.72
FIB4 SCORE: 1.72

## 2021-10-13 NOTE — PROCEDURE: LIQUID NITROGEN
Post-Care Instructions: I reviewed with the patient in detail post-care instructions. Patient is to wear sunprotection, and avoid picking at any of the treated lesions. Pt may apply Vaseline to crusted or scabbing areas.
Consent: The patient's consent was obtained including but not limited to risks of crusting, scabbing, blistering, scarring, darker or lighter pigmentary change, recurrence, incomplete removal and infection. RTC in 2 months if lesion(s) persistent.
Number Of Freeze-Thaw Cycles: 2 freeze-thaw cycles
Duration Of Freeze Thaw-Cycle (Seconds): 10
Render Note In Bullet Format When Appropriate: No
Show Applicator Variable?: Yes
Detail Level: Detailed

## 2021-10-14 DIAGNOSIS — R10.31 ABDOMINAL PAIN, ACUTE, RIGHT LOWER QUADRANT: ICD-10-CM

## 2021-10-14 DIAGNOSIS — R30.0 DYSURIA: ICD-10-CM

## 2021-10-14 NOTE — PROGRESS NOTES
"Subjective     Hermelinda Roger is a 76 y.o. female who presents with UTI (Possible UTI)  There is a new patient me unable see PCP today.  She has a 1 week history of urinary frequency and right lower quadrant abdominal discomfort and for the last few days has had dysuria but no blood in the urine.  She is several years status post appendectomy.  She has no fever chills or flank pain.  No nausea or vomiting.  Wants to know if she has UTI.    The patient is very active and swims 3 times a week and is planning on an extensive backpacking trip in the near future out of the country.  Training vigorously.  Along with her .          HPI    Review of Systems   Constitutional: Negative.    HENT: Negative.    Eyes: Negative.    Respiratory: Negative.    Cardiovascular: Negative.    Gastrointestinal: Positive for abdominal pain.   Genitourinary: Positive for dysuria and frequency.   Musculoskeletal: Negative.    Skin: Negative.    Neurological: Negative.    Endo/Heme/Allergies: Negative.    Psychiatric/Behavioral: Negative.               Objective     /72 (BP Location: Right arm, Patient Position: Sitting, BP Cuff Size: Adult)   Pulse 88   Temp 36.6 °C (97.8 °F) (Temporal)   Ht 1.575 m (5' 2\")   Wt 56.4 kg (124 lb 6.4 oz)   LMP 03/01/1986   SpO2 98%   BMI 22.75 kg/m²      Physical Exam  Vitals reviewed.   Constitutional:       General: She is not in acute distress.     Appearance: She is well-developed. She is not diaphoretic.   HENT:      Head: Normocephalic and atraumatic.      Right Ear: External ear normal.      Left Ear: External ear normal.      Nose: Nose normal.      Mouth/Throat:      Pharynx: No oropharyngeal exudate.   Eyes:      General: No scleral icterus.        Right eye: No discharge.         Left eye: No discharge.      Conjunctiva/sclera: Conjunctivae normal.      Pupils: Pupils are equal, round, and reactive to light.   Neck:      Thyroid: No thyromegaly.      Vascular: No JVD.      " Trachea: No tracheal deviation.   Cardiovascular:      Rate and Rhythm: Normal rate and regular rhythm.      Heart sounds: Normal heart sounds. No murmur heard.   No friction rub. No gallop.    Pulmonary:      Effort: Pulmonary effort is normal. No respiratory distress.      Breath sounds: Normal breath sounds. No stridor. No wheezing or rales.   Chest:      Chest wall: No tenderness.   Abdominal:      General: Bowel sounds are normal. There is no distension.      Palpations: Abdomen is soft. There is no mass.      Tenderness: There is abdominal tenderness. There is no guarding or rebound.      Comments: There is some mild right lower quadrant discomfort without rebound but the belly is soft and the bowel sounds are normal no masses appreciated.  She does not seem to have exquisite tenderness however.   Musculoskeletal:         General: No tenderness. Normal range of motion.      Cervical back: Normal range of motion and neck supple.   Lymphadenopathy:      Cervical: No cervical adenopathy.   Skin:     General: Skin is warm and dry.      Coloration: Skin is not pale.      Findings: No erythema or rash.   Neurological:      Mental Status: She is alert and oriented to person, place, and time.      Cranial Nerves: No cranial nerve deficit.      Motor: No abnormal muscle tone.      Coordination: Coordination normal.      Deep Tendon Reflexes: Reflexes are normal and symmetric. Reflexes normal.   Psychiatric:         Behavior: Behavior normal.         Thought Content: Thought content normal.         Judgment: Judgment normal.       Dipstick UA was entirely negative.  The patient has been on cranberry juice and drinking lots of water.                      Assessment & Plan        1. Dysuria-symptoms highly suggestive of a UTI but the UA is completely normal.  We will have her continue take cranberry juice and hydrate well and if her dysuria persists for the next 2 days she will start on Macrobid.  If the symptoms worsen  should go to urgent care for further evaluation and possible to ER for abdominal ultrasound or CAT scan.  Otherwise follow-up with PCP Dr. Felix in 1 week.      - POCT Urinalysis  - nitrofurantoin (MACROBID) 100 MG Cap; Take 1 Capsule by mouth 2 times a day for 10 days.  Dispense: 20 Capsule; Refill: 1  - URINE CULTURE(NEW); Future    2. Abdominal pain, acute, right lower quadrant       See above  - URINE CULTURE(NEW); Future

## 2021-10-16 LAB
BACTERIA UR CULT: NORMAL
SIGNIFICANT IND 70042: NORMAL
SITE SITE: NORMAL
SOURCE SOURCE: NORMAL

## 2021-10-20 ENCOUNTER — HOSPITAL ENCOUNTER (OUTPATIENT)
Facility: MEDICAL CENTER | Age: 76
End: 2021-10-20
Attending: NURSE PRACTITIONER
Payer: MEDICARE

## 2021-10-20 ENCOUNTER — HOSPITAL ENCOUNTER (OUTPATIENT)
Dept: RADIOLOGY | Facility: MEDICAL CENTER | Age: 76
End: 2021-10-20
Attending: NURSE PRACTITIONER
Payer: MEDICARE

## 2021-10-20 DIAGNOSIS — R10.31 RLQ ABDOMINAL PAIN: ICD-10-CM

## 2021-10-20 LAB — AMBIGUOUS DTTM AMBI4: NORMAL

## 2021-10-20 PROCEDURE — 81003 URINALYSIS AUTO W/O SCOPE: CPT

## 2021-10-20 PROCEDURE — 76830 TRANSVAGINAL US NON-OB: CPT

## 2021-10-21 LAB
APPEARANCE UR: CLEAR
BILIRUB UR QL STRIP.AUTO: NEGATIVE
COLOR UR: YELLOW
GLUCOSE UR STRIP.AUTO-MCNC: NEGATIVE MG/DL
KETONES UR STRIP.AUTO-MCNC: NEGATIVE MG/DL
LEUKOCYTE ESTERASE UR QL STRIP.AUTO: NEGATIVE
MICRO URNS: NORMAL
NITRITE UR QL STRIP.AUTO: NEGATIVE
PH UR STRIP.AUTO: 7.5 [PH] (ref 5–8)
PROT UR QL STRIP: NEGATIVE MG/DL
RBC UR QL AUTO: NEGATIVE
SP GR UR STRIP.AUTO: 1.01
UROBILINOGEN UR STRIP.AUTO-MCNC: 0.2 MG/DL

## 2021-10-22 ENCOUNTER — HOSPITAL ENCOUNTER (OUTPATIENT)
Dept: LAB | Facility: MEDICAL CENTER | Age: 76
End: 2021-10-22
Attending: NURSE PRACTITIONER
Payer: MEDICARE

## 2021-10-22 LAB — CANCER AG125 SERPL-ACNC: 7.2 U/ML (ref 0–35)

## 2021-10-22 PROCEDURE — 36415 COLL VENOUS BLD VENIPUNCTURE: CPT | Mod: GA

## 2021-10-22 PROCEDURE — 86304 IMMUNOASSAY TUMOR CA 125: CPT | Mod: GA

## 2021-10-25 ENCOUNTER — OFFICE VISIT (OUTPATIENT)
Dept: MEDICAL GROUP | Age: 76
End: 2021-10-25
Payer: MEDICARE

## 2021-10-25 VITALS
WEIGHT: 122 LBS | DIASTOLIC BLOOD PRESSURE: 60 MMHG | OXYGEN SATURATION: 96 % | BODY MASS INDEX: 22.45 KG/M2 | TEMPERATURE: 97.2 F | SYSTOLIC BLOOD PRESSURE: 110 MMHG | HEART RATE: 85 BPM | HEIGHT: 62 IN

## 2021-10-25 DIAGNOSIS — R10.31 PAIN, ABDOMINAL, RLQ: ICD-10-CM

## 2021-10-25 PROCEDURE — 99214 OFFICE O/P EST MOD 30 MIN: CPT | Performed by: FAMILY MEDICINE

## 2021-10-25 ASSESSMENT — FIBROSIS 4 INDEX: FIB4 SCORE: 1.72

## 2021-10-25 NOTE — PROGRESS NOTES
Subjective:   CC: ***    HPI:     Hermelinda Roger is a 76 y.o. female, established patient of the clinic.     3-4 weeks right inguinal discomfort with urination   A few days before seeing Dr. Mendez, pelvis discomfort  Saw Cindy heard, gyn, labs an ultrasound done     Current medicines (including changes today)  Current Outpatient Medications   Medication Sig Dispense Refill   • Magnesium 400 MG Cap Take  by mouth.     • alendronate (FOSAMAX) 70 MG Tab Take 1 tablet by mouth every 7 days. 12 tablet 3   • amLODIPine (NORVASC) 5 MG Tab Take 1 tablet by mouth every day. 90 tablet 3   • fluticasone (FLONASE) 50 MCG/ACT nasal spray Administer 1 Spray into affected nostril(S) every day. 48 g 3   • atorvastatin (LIPITOR) 20 MG Tab Take 1 tablet by mouth every day. 90 tablet 3   • diclofenac sodium 1 % Gel Apply to 2-4 gram to affected area 4 times daily 100 g 5   • valacyclovir (VALTREX) 1 GM Tab Take 0.5 tablet once daily 45 tablet 3   • famotidine (PEPCID) 20 MG Tab Take 20 mg by mouth 1 time daily as needed.     • Cholecalciferol (VITAMIN D) 2000 UNIT Tab Take  by mouth every day.     • CALCIUM 500 + D PO Take  by mouth. 2 daily     • gabapentin (NEURONTIN) 100 MG Cap Take 1 capsule by mouth 3 times a day. 270 capsule 0     No current facility-administered medications for this visit.     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    I personally reviewed patient's problem list, allergies, medications,  "family hx, social hx with patient and update EPIC.     REVIEW OF SYSTEMS:  CONSTITUTIONAL:  Denies night sweats, fatigue, malaise, lethargy, fever or chills.  RESPIRATORY:  Denies cough, wheeze, hemoptysis, or shortness of breath.  CARDIOVASCULAR:  Denies chest pains, palpitations, pedal edema     Objective:     /60 (BP Location: Right arm, Patient Position: Sitting, BP Cuff Size: Adult)   Pulse 85   Temp 36.2 °C (97.2 °F) (Temporal)   Ht 1.575 m (5' 2\")   Wt 55.3 kg (122 lb)   SpO2 96%  Body mass index is 22.31 kg/m².    Physical Exam:  Constitutional: awake, alert, in no distress.  Skin: Warm, dry, good turgor, no rashes, bruises, ulcers in visible areas.  Eye: conjunctiva clear, lids neg for edema or lesions.  ENMT: TM and auditory canals wnl. Oral and nasal mucosa wnl. Lips without lesions, good dentition, oropharynx clear.  Neck: Trachea midline, no masses, no thyromegaly. No cervical or supraclavicular lymphadenopathy  Respiratory: Unlabored respiratory effort, lungs clear to auscultation, no wheezes, no rales.  Cardiovascular: Normal S1, S2, no murmur, no pedal edema.  Abdomen: Soft, non-tender to palpation, active BS, no hernia, no hepatosplenomegaly, negative rebound or guarding.   Neuro: CN2-12 grossly intact. Strength 5/5, reflexes 2/4 in all extremities, no sensory deficits.   Psych: Oriented x3, affect and mood wnl, intact judgement and insight.       Assessment and Plan:   The following treatment plan was discussed    There are no diagnoses linked to this encounter.    Johana Felix M.D.      Followup: No follow-ups on file.    Please note that this dictation was created using voice recognition software. I have made every reasonable attempt to correct obvious errors, but I expect that there are errors of grammar and possibly content that I did not discover before finalizing the note.           "

## 2021-10-27 ENCOUNTER — HOSPITAL ENCOUNTER (OUTPATIENT)
Dept: RADIOLOGY | Facility: MEDICAL CENTER | Age: 76
End: 2021-10-27
Attending: FAMILY MEDICINE
Payer: MEDICARE

## 2021-10-27 DIAGNOSIS — R10.31 PAIN, ABDOMINAL, RLQ: ICD-10-CM

## 2021-10-27 PROCEDURE — 76857 US EXAM PELVIC LIMITED: CPT

## 2021-10-27 PROCEDURE — 76705 ECHO EXAM OF ABDOMEN: CPT

## 2021-10-27 NOTE — PROGRESS NOTES
Subjective:   CC: right inguinal and pelvic pain     HPI:     Hermelinda Roger is a 76 y.o. female, established patient of the clinic.     Patient was in her normal state of health until 3-4 weeks ago when she develops acute right inguinal pain and pressure with urination. A few days later, she develops lower pelvic discomfort. She was seen by Dr. Mendez and was treated with UTI with Macrobid. She did not take this medication as urine culture and urine dip were negative. She was seen by OBGYN.  and pelvic ultrasound were ordered by Cindy HERNANDEZ. Both were negative for acute findings. Patient presents today to discuss management of her symptoms. Today, she states that she also notices worsening right inguinal and pelvic symptoms with reaching/swimming and heavy lifting.     Negative fever, chills, nausea, vomiting, hematochezia, melena.   She is s/p appendectomy and hysterectomy.   She is able to tolerate oral hydration/diet without problems.   She is very physically active. She exercises regularly couple times per week and participate in multiple sports.    Transabdominal/transpelvic US:   1.  Post hysterectomy.   2.  Ovaries are not identified.   3.  2 small adjacent cystic structures in the right hemipelvis are identified. These could represent small peritoneal inclusion cysts or lymphoceles or paraovarian cysts.    Current medicines (including changes today)  Current Outpatient Medications   Medication Sig Dispense Refill   • Magnesium 400 MG Cap Take  by mouth.     • alendronate (FOSAMAX) 70 MG Tab Take 1 tablet by mouth every 7 days. 12 tablet 3   • amLODIPine (NORVASC) 5 MG Tab Take 1 tablet by mouth every day. 90 tablet 3   • fluticasone (FLONASE) 50 MCG/ACT nasal spray Administer 1 Spray into affected nostril(S) every day. 48 g 3   • atorvastatin (LIPITOR) 20 MG Tab Take 1 tablet by mouth every day. 90 tablet 3   • diclofenac sodium 1 % Gel Apply to 2-4 gram to affected area 4 times daily 100 g  "5   • valacyclovir (VALTREX) 1 GM Tab Take 0.5 tablet once daily 45 tablet 3   • famotidine (PEPCID) 20 MG Tab Take 20 mg by mouth 1 time daily as needed.     • Cholecalciferol (VITAMIN D) 2000 UNIT Tab Take  by mouth every day.     • CALCIUM 500 + D PO Take  by mouth. 2 daily       No current facility-administered medications for this visit.     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    I personally reviewed patient's problem list, allergies, medications, family hx, social hx with patient and update EPIC.     REVIEW OF SYSTEMS:  CONSTITUTIONAL:  Denies night sweats, fatigue, malaise, lethargy, fever or chills.  RESPIRATORY:  Denies cough, wheeze, hemoptysis, or shortness of breath.  CARDIOVASCULAR:  Denies chest pains, palpitations, pedal edema     Objective:     /60 (BP Location: Right arm, Patient Position: Sitting, BP Cuff Size: Adult)   Pulse 85   Temp 36.2 °C (97.2 °F) (Temporal)   Ht 1.575 m (5' 2\")   Wt 55.3 kg (122 lb)   SpO2 96%  Body mass index is 22.31 kg/m².    Physical Exam:  Constitutional: awake, alert, in no distress.  Skin: Warm, dry, good turgor, no rashes, bruises, ulcers in visible areas.  Eye: conjunctiva clear, lids neg for edema or lesions.  Respiratory: Unlabored respiratory effort, lungs clear to auscultation, no wheezes, no rales.  Cardiovascular: Normal S1, S2, no murmur, no pedal edema.  Abdomen: Soft, non-tender to palpation, active BS, no hernia, no " hepatosplenomegaly, negative rebound or guarding.   - mild tenderness to palpation of the right inguinal and lower pelvic soft tissue without visible mass.   Psych: Oriented x3, affect and mood wnl, intact judgement and insight.       Assessment and Plan:   The following treatment plan was discussed    1. Pain, abdominal, RLQ  - US-HERNIA ABDOMEN; Future  - US-INGUINAL HERNIA; Future  - ER precautions discussed.     Total time spent reviewing pt's chart, labs, notes, imaging, and counseling/prescribing medications to patient before, during, and after the visit: 30 minutes.      Johana Felix M.D.      Followup: Return for As needed.    Please note that this dictation was created using voice recognition software. I have made every reasonable attempt to correct obvious errors, but I expect that there are errors of grammar and possibly content that I did not discover before finalizing the note.

## 2021-11-17 ENCOUNTER — PATIENT MESSAGE (OUTPATIENT)
Dept: MEDICAL GROUP | Age: 76
End: 2021-11-17

## 2021-11-17 DIAGNOSIS — Z29.89 ENCOUNTER FOR ALTITUDE SICKNESS PROPHYLAXIS: ICD-10-CM

## 2021-11-19 RX ORDER — ACETAZOLAMIDE 125 MG/1
TABLET ORAL
Qty: 30 TABLET | Refills: 0 | Status: SHIPPED | OUTPATIENT
Start: 2021-11-19 | End: 2022-06-24

## 2021-12-03 PROBLEM — M25.552 BILATERAL HIP PAIN: Status: ACTIVE | Noted: 2021-12-03

## 2021-12-03 PROBLEM — M25.551 BILATERAL HIP PAIN: Status: ACTIVE | Noted: 2021-12-03

## 2022-03-02 ENCOUNTER — HOSPITAL ENCOUNTER (OUTPATIENT)
Dept: LAB | Facility: MEDICAL CENTER | Age: 77
End: 2022-03-02
Attending: UROLOGY
Payer: MEDICARE

## 2022-03-02 LAB
BUN SERPL-MCNC: 23 MG/DL (ref 8–22)
CREAT SERPL-MCNC: 0.7 MG/DL (ref 0.5–1.4)

## 2022-03-02 PROCEDURE — 36415 COLL VENOUS BLD VENIPUNCTURE: CPT

## 2022-03-02 PROCEDURE — 84520 ASSAY OF UREA NITROGEN: CPT

## 2022-03-02 PROCEDURE — 82565 ASSAY OF CREATININE: CPT

## 2022-03-11 ENCOUNTER — PATIENT MESSAGE (OUTPATIENT)
Dept: MEDICAL GROUP | Age: 77
End: 2022-03-11
Payer: MEDICARE

## 2022-03-17 ENCOUNTER — PATIENT MESSAGE (OUTPATIENT)
Dept: MEDICAL GROUP | Age: 77
End: 2022-03-17
Payer: MEDICARE

## 2022-03-17 DIAGNOSIS — Z12.31 ENCOUNTER FOR SCREENING MAMMOGRAM FOR BREAST CANCER: ICD-10-CM

## 2022-04-04 ENCOUNTER — HOSPITAL ENCOUNTER (OUTPATIENT)
Dept: RADIOLOGY | Facility: MEDICAL CENTER | Age: 77
End: 2022-04-04
Attending: FAMILY MEDICINE
Payer: MEDICARE

## 2022-04-04 DIAGNOSIS — Z12.31 ENCOUNTER FOR SCREENING MAMMOGRAM FOR BREAST CANCER: ICD-10-CM

## 2022-04-04 PROCEDURE — 77063 BREAST TOMOSYNTHESIS BI: CPT

## 2022-06-24 DIAGNOSIS — E55.9 VITAMIN D INSUFFICIENCY: ICD-10-CM

## 2022-06-24 DIAGNOSIS — E78.00 PURE HYPERCHOLESTEROLEMIA: ICD-10-CM

## 2022-06-25 ENCOUNTER — HOSPITAL ENCOUNTER (OUTPATIENT)
Dept: LAB | Facility: MEDICAL CENTER | Age: 77
End: 2022-06-25
Attending: FAMILY MEDICINE
Payer: MEDICARE

## 2022-06-25 DIAGNOSIS — E55.9 VITAMIN D INSUFFICIENCY: ICD-10-CM

## 2022-06-25 DIAGNOSIS — E78.00 PURE HYPERCHOLESTEROLEMIA: ICD-10-CM

## 2022-06-25 LAB
25(OH)D3 SERPL-MCNC: 61 NG/ML (ref 30–100)
ALBUMIN SERPL BCP-MCNC: 4.2 G/DL (ref 3.2–4.9)
ALBUMIN/GLOB SERPL: 1.4 G/DL
ALP SERPL-CCNC: 66 U/L (ref 30–99)
ALT SERPL-CCNC: 9 U/L (ref 2–50)
ANION GAP SERPL CALC-SCNC: 9 MMOL/L (ref 7–16)
AST SERPL-CCNC: 16 U/L (ref 12–45)
BASOPHILS # BLD AUTO: 0.7 % (ref 0–1.8)
BASOPHILS # BLD: 0.04 K/UL (ref 0–0.12)
BILIRUB SERPL-MCNC: 0.5 MG/DL (ref 0.1–1.5)
BUN SERPL-MCNC: 14 MG/DL (ref 8–22)
CALCIUM SERPL-MCNC: 8.8 MG/DL (ref 8.5–10.5)
CHLORIDE SERPL-SCNC: 107 MMOL/L (ref 96–112)
CHOLEST SERPL-MCNC: 189 MG/DL (ref 100–199)
CO2 SERPL-SCNC: 24 MMOL/L (ref 20–33)
CREAT SERPL-MCNC: 0.6 MG/DL (ref 0.5–1.4)
EOSINOPHIL # BLD AUTO: 0.14 K/UL (ref 0–0.51)
EOSINOPHIL NFR BLD: 2.5 % (ref 0–6.9)
ERYTHROCYTE [DISTWIDTH] IN BLOOD BY AUTOMATED COUNT: 48 FL (ref 35.9–50)
FASTING STATUS PATIENT QL REPORTED: NORMAL
GFR SERPLBLD CREATININE-BSD FMLA CKD-EPI: 92 ML/MIN/1.73 M 2
GLOBULIN SER CALC-MCNC: 2.9 G/DL (ref 1.9–3.5)
GLUCOSE SERPL-MCNC: 88 MG/DL (ref 65–99)
HCT VFR BLD AUTO: 40.4 % (ref 37–47)
HDLC SERPL-MCNC: 55 MG/DL
HGB BLD-MCNC: 13.4 G/DL (ref 12–16)
IMM GRANULOCYTES # BLD AUTO: 0.01 K/UL (ref 0–0.11)
IMM GRANULOCYTES NFR BLD AUTO: 0.2 % (ref 0–0.9)
LDLC SERPL CALC-MCNC: 108 MG/DL
LYMPHOCYTES # BLD AUTO: 1.2 K/UL (ref 1–4.8)
LYMPHOCYTES NFR BLD: 21.5 % (ref 22–41)
MCH RBC QN AUTO: 34 PG (ref 27–33)
MCHC RBC AUTO-ENTMCNC: 33.2 G/DL (ref 33.6–35)
MCV RBC AUTO: 102.5 FL (ref 81.4–97.8)
MONOCYTES # BLD AUTO: 0.49 K/UL (ref 0–0.85)
MONOCYTES NFR BLD AUTO: 8.8 % (ref 0–13.4)
NEUTROPHILS # BLD AUTO: 3.71 K/UL (ref 2–7.15)
NEUTROPHILS NFR BLD: 66.3 % (ref 44–72)
NRBC # BLD AUTO: 0 K/UL
NRBC BLD-RTO: 0 /100 WBC
PLATELET # BLD AUTO: 311 K/UL (ref 164–446)
PMV BLD AUTO: 9.9 FL (ref 9–12.9)
POTASSIUM SERPL-SCNC: 4.2 MMOL/L (ref 3.6–5.5)
PROT SERPL-MCNC: 7.1 G/DL (ref 6–8.2)
RBC # BLD AUTO: 3.94 M/UL (ref 4.2–5.4)
SODIUM SERPL-SCNC: 140 MMOL/L (ref 135–145)
TRIGL SERPL-MCNC: 132 MG/DL (ref 0–149)
WBC # BLD AUTO: 5.6 K/UL (ref 4.8–10.8)

## 2022-06-25 PROCEDURE — 36415 COLL VENOUS BLD VENIPUNCTURE: CPT

## 2022-06-25 PROCEDURE — 82306 VITAMIN D 25 HYDROXY: CPT

## 2022-06-25 PROCEDURE — 85025 COMPLETE CBC W/AUTO DIFF WBC: CPT

## 2022-06-25 PROCEDURE — 80053 COMPREHEN METABOLIC PANEL: CPT

## 2022-06-25 PROCEDURE — 80061 LIPID PANEL: CPT

## 2022-07-18 ENCOUNTER — OFFICE VISIT (OUTPATIENT)
Dept: MEDICAL GROUP | Age: 77
End: 2022-07-18
Payer: MEDICARE

## 2022-07-18 ENCOUNTER — HOSPITAL ENCOUNTER (OUTPATIENT)
Dept: RADIOLOGY | Facility: MEDICAL CENTER | Age: 77
End: 2022-07-18
Attending: FAMILY MEDICINE
Payer: MEDICARE

## 2022-07-18 VITALS
WEIGHT: 123 LBS | DIASTOLIC BLOOD PRESSURE: 62 MMHG | BODY MASS INDEX: 21.79 KG/M2 | HEART RATE: 83 BPM | SYSTOLIC BLOOD PRESSURE: 116 MMHG | OXYGEN SATURATION: 97 % | HEIGHT: 63 IN | TEMPERATURE: 96.1 F

## 2022-07-18 DIAGNOSIS — M16.0 PRIMARY OSTEOARTHRITIS OF BOTH HIPS: ICD-10-CM

## 2022-07-18 DIAGNOSIS — K21.9 GASTROESOPHAGEAL REFLUX DISEASE WITHOUT ESOPHAGITIS: ICD-10-CM

## 2022-07-18 DIAGNOSIS — M15.9 GENERALIZED OSTEOARTHRITIS: ICD-10-CM

## 2022-07-18 DIAGNOSIS — I73.00 RAYNAUD'S PHENOMENON WITHOUT GANGRENE: ICD-10-CM

## 2022-07-18 DIAGNOSIS — Z00.00 MEDICARE ANNUAL WELLNESS VISIT, SUBSEQUENT: Primary | ICD-10-CM

## 2022-07-18 DIAGNOSIS — Z87.81 HISTORY OF FRACTURE OF RIGHT ANKLE: ICD-10-CM

## 2022-07-18 DIAGNOSIS — G50.0 TRIGEMINAL NEURALGIA: ICD-10-CM

## 2022-07-18 DIAGNOSIS — A60.04 HERPES SIMPLEX VULVOVAGINITIS: ICD-10-CM

## 2022-07-18 DIAGNOSIS — M25.571 ACUTE RIGHT ANKLE PAIN: ICD-10-CM

## 2022-07-18 DIAGNOSIS — J30.9 ALLERGIC RHINITIS, UNSPECIFIED SEASONALITY, UNSPECIFIED TRIGGER: ICD-10-CM

## 2022-07-18 DIAGNOSIS — E78.00 PURE HYPERCHOLESTEROLEMIA: ICD-10-CM

## 2022-07-18 DIAGNOSIS — M85.89 OSTEOPENIA OF MULTIPLE SITES: ICD-10-CM

## 2022-07-18 DIAGNOSIS — H91.13 PRESBYCUSIS OF BOTH EARS: ICD-10-CM

## 2022-07-18 DIAGNOSIS — M19.071 ARTHRITIS OF ANKLE, RIGHT: ICD-10-CM

## 2022-07-18 DIAGNOSIS — Z78.0 ASYMPTOMATIC POSTMENOPAUSAL STATE: ICD-10-CM

## 2022-07-18 DIAGNOSIS — I87.2 VENOUS INSUFFICIENCY: ICD-10-CM

## 2022-07-18 PROCEDURE — 99214 OFFICE O/P EST MOD 30 MIN: CPT | Mod: 25 | Performed by: FAMILY MEDICINE

## 2022-07-18 PROCEDURE — G0439 PPPS, SUBSEQ VISIT: HCPCS | Mod: 25 | Performed by: FAMILY MEDICINE

## 2022-07-18 PROCEDURE — 73610 X-RAY EXAM OF ANKLE: CPT | Mod: RT

## 2022-07-18 RX ORDER — FLUTICASONE PROPIONATE 50 MCG
1 SPRAY, SUSPENSION (ML) NASAL DAILY
Qty: 48 G | Refills: 3 | Status: SHIPPED | OUTPATIENT
Start: 2022-07-18 | End: 2023-07-28 | Stop reason: SDUPTHER

## 2022-07-18 RX ORDER — FEXOFENADINE HCL 180 MG/1
180 TABLET ORAL DAILY
Qty: 90 TABLET | Refills: 3 | Status: SHIPPED | OUTPATIENT
Start: 2022-07-18

## 2022-07-18 RX ORDER — ATORVASTATIN CALCIUM 20 MG/1
20 TABLET, FILM COATED ORAL
Qty: 90 TABLET | Refills: 3 | Status: SHIPPED | OUTPATIENT
Start: 2022-07-18 | End: 2023-07-06

## 2022-07-18 RX ORDER — VALACYCLOVIR HYDROCHLORIDE 1 G/1
TABLET, FILM COATED ORAL
Qty: 45 TABLET | Refills: 3 | Status: SHIPPED | OUTPATIENT
Start: 2022-07-18 | End: 2023-06-26

## 2022-07-18 RX ORDER — ALENDRONATE SODIUM 70 MG/1
70 TABLET ORAL
Qty: 12 TABLET | Refills: 3 | Status: SHIPPED | OUTPATIENT
Start: 2022-07-18 | End: 2023-06-26

## 2022-07-18 RX ORDER — AMLODIPINE BESYLATE 5 MG/1
5 TABLET ORAL
Qty: 90 TABLET | Refills: 3 | Status: SHIPPED | OUTPATIENT
Start: 2022-07-18 | End: 2023-08-22

## 2022-07-18 RX ORDER — FAMOTIDINE 20 MG/1
20 TABLET, FILM COATED ORAL DAILY
Qty: 90 TABLET | Refills: 3 | Status: SHIPPED | OUTPATIENT
Start: 2022-07-18 | End: 2023-07-24

## 2022-07-18 ASSESSMENT — FIBROSIS 4 INDEX: FIB4 SCORE: 1.34

## 2022-07-18 ASSESSMENT — PATIENT HEALTH QUESTIONNAIRE - PHQ9: CLINICAL INTERPRETATION OF PHQ2 SCORE: 0

## 2022-07-18 ASSESSMENT — ACTIVITIES OF DAILY LIVING (ADL): BATHING_REQUIRES_ASSISTANCE: 0

## 2022-07-18 ASSESSMENT — ENCOUNTER SYMPTOMS: GENERAL WELL-BEING: EXCELLENT

## 2022-07-18 NOTE — PROGRESS NOTES
Chief Complaint   Patient presents with   • Lab Results   • Medication Refill       HPI:  Hermelinda Roger is a 77 y.o. here for Medicare Annual Wellness Visit     Patient is overall doing well.  She is taking all medication as directed.  She remains physically active.  She tolerates all medication well, no side effect reported.    Acute concerns:  Patient recently went to [place] for an extended trip lasting several week to multiple countries.  Patient reports extensive walking during her trip.  On her flight back from [place] approximately 8 weeks ago, patient noticed acute swelling of the right lateral ankle.  There is also mild pain associated.  Edema is better in the morning, worse toward the end of the day or with prolonged ambulation/standing.  Patient has history of right ankle fracture, status post ORIF in 2015 with hardware removal 11 months following the initial surgery.  She also has history of osteoarthritis affecting multiple large joints in her body.  Patient is taking Voltaren as needed for osteoarthritic pain.  She continues to be physically active.  Symptoms do not interfere with her daily activity.    Patient Active Problem List    Diagnosis Date Noted   • Presbycusis of both ears 07/18/2022   • Bilateral hip pain_secondary to femoral acetabular impingement s/p injection on the R by [facility] 12/03/2021   • Trigeminal neuralgia_R 07/15/2021   • Herpes simplex vulvovaginitis 06/22/2017   • Generalized osteoarthritis 08/22/2011   • 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    • Pure hypercholesterolemia    • Gastroesophageal reflux disease without esophagitis    • Raynaud's phenomenon    • Osteopenia of multiple sites        Current Outpatient Medications   Medication Sig Dispense Refill   • alendronate (FOSAMAX) 70 MG Tab Take 1 Tablet by mouth every 7 days. 12 Tablet 3   • amLODIPine (NORVASC) 5 MG Tab Take 1 Tablet by mouth every day. 90 Tablet 3   • atorvastatin (LIPITOR) 20 MG Tab Take 1 Tablet by mouth  every day. 90 Tablet 3   • famotidine (PEPCID) 20 MG Tab Take 1 Tablet by mouth every day. 90 Tablet 3   • valacyclovir (VALTREX) 1 GM Tab Take 0.5 tablet once daily 45 Tablet 3   • fluticasone (FLONASE) 50 MCG/ACT nasal spray Administer 1 Spray into affected nostril(S) every day. 48 g 3   • diclofenac sodium (VOLTAREN) 1 % Gel Apply to 2-4 gram to affected area 4 times daily 100 g 5   • fexofenadine (ALLEGRA) 180 MG tablet Take 1 Tablet by mouth every day. 90 Tablet 3   • Magnesium 400 MG Cap Take  by mouth.     • Cholecalciferol (VITAMIN D) 2000 UNIT Tab Take  by mouth every day.     • CALCIUM 500 + D PO Take  by mouth. 2 daily       No current facility-administered medications for this visit.          Current supplements as per medication list.     Allergies: Carbamazepine, Nabumetone, Tape, and Tramadol    Current social contact/activities: hang out with friends     She  reports that she has never smoked. She has never used smokeless tobacco. She reports current alcohol use of about 2.4 oz of alcohol per week. She reports that she does not use drugs.  Counseling given: Not Answered      DPA/Advanced Directive:  Patient has Advanced Directive, but it is not on file. Instructed to bring in a copy to scan into their chart.    ROS:    Gait: Uses no assistive device  Ostomy: No  Other tubes: No  Amputations: No  Chronic oxygen use: No  Last eye exam: 10 months ago  Wears hearing aids: Yes   : Denies any urinary leakage during the last 6 months    Screening:  Depression Screening  Little interest or pleasure in doing things?  0 - not at all  Feeling down, depressed , or hopeless? 0 - not at all  Patient Health Questionnaire Score: 0     If depressive symptoms identified deferred to follow up visit unless specifically addressed in assessment and plan.    Interpretation of PHQ-9 Total Score   Score Severity   1-4 No Depression   5-9 Mild Depression   10-14 Moderate Depression   15-19 Moderately Severe Depression    20-27 Severe Depression    Screening for Cognitive Impairment  Three Minute Recall (daughter, heaven, mountain)  /3    Yonatan clock face with all 12 numbers and set the hands to show 10 past 11.       Cognitive concerns identified deferred for follow up unless specifically addressed in assessment and plan.    Fall Risk Assessment  Has the patient had two or more falls in the last year or any fall with injury in the last year?  No    Safety Assessment  Throw rugs on floor.     Handrails on all stairs.     Good lighting in all hallways.     Difficulty hearing.     Patient counseled about all safety risks that were identified.    Functional Assessment ADLs  Are there any barriers preventing you from cooking for yourself or meeting nutritional needs?   .    Are there any barriers preventing you from driving safely or obtaining transportation?   .    Are there any barriers preventing you from using a telephone or calling for help?   .    Are there any barriers preventing you from shopping?   .    Are there any barriers preventing you from taking care of your own finances?   .    Are there any barriers preventing you from managing your medications?   .    Are there any barriers preventing you from showering, bathing or dressing yourself?   .    Are you currently engaging in any exercise or physical activity?   .     What is your perception of your health?   .      Health Maintenance Summary          Overdue - Annual Wellness Visit (Every 366 Days) Overdue since 7/16/2022    07/15/2021  Level of Service: [place] ANNUAL WELLNESS VISIT-INCLUDES PPPS SUBSEQUE*    07/15/2021  Subsequent Annual Wellness Visit - Includes PPPS ()          IMM INFLUENZA (1) Next due on 9/1/2022 08/18/2021  Imm Admin: Influenza Vaccine Adult HD    08/25/2020  Imm Admin: Influenza Vaccine Adult HD    08/27/2018  Imm Admin: Influenza Vaccine Adult HD    10/02/2017  Imm Admin: Influenza Vaccine Adult HD    10/03/2016  Imm Admin: Influenza Vaccine  Adult HD    Only the first 5 history entries have been loaded, but more history exists.          Ordered - BONE DENSITY (Every 5 Years) Ordered on 7/18/2022 02/05/2020  DS-BONE DENSITY STUDY (DEXA)    08/22/2016  DS-BONE DENSITY STUDY (DEXA)    06/19/2014  DS-BONE DENSITY STUDY (DEXA)    06/27/2011  DS-BONE DENSITY STUDY (DEXA)    05/07/2009  DS-BONE DENSITY STUDY (DEXA)          IMM DTaP/Tdap/Td Vaccine (3 - Td or Tdap) Next due on 5/25/2031 05/25/2021  Imm Admin: Tdap Vaccine    06/05/2015  Imm Admin: Tdap Vaccine          IMM PNEUMOCOCCAL VACCINE: 65+ Years (Series Information) Completed    04/04/2017  Imm Admin: Pneumococcal polysaccharide vaccine (PPSV-23)    06/05/2015  Imm Admin: Pneumococcal Conjugate Vaccine (Prevnar/PCV-13)          COVID-19 Vaccine (Series Information) Completed    04/22/2022  Imm Admin: COVID-19 Vaccine, unspecified - HISTORICAL DATA    10/07/2021  Imm Admin: PFIZER PURPLE CAP SARS-COV-2 VACCINATION (12+)    02/11/2021  Imm Admin: PFIZER PURPLE CAP SARS-COV-2 VACCINATION (12+)    01/21/2021  Imm Admin: PFIZER PURPLE CAP SARS-COV-2 VACCINATION (12+)          IMM HEP B VACCINE (Series Information) Aged Out    No completion history exists for this topic.          IMM MENINGOCOCCAL VACCINE (MCV4) (Series Information) Aged Out    No completion history exists for this topic.          Discontinued - MAMMOGRAM  Discontinued    04/04/2022  MA-SCREENING MAMMO BILAT W/TOMOSYNTHESIS W/CAD    04/01/2021  MA-SCREENING MAMMO BILAT W/TOMOSYNTHESIS W/CAD    02/05/2020  MA-SCREENING MAMMO BILAT W/TOMOSYNTHESIS W/CAD    01/07/2019  MA-SCREENING MAMMO BILAT W/TOMOSYNTHESIS W/CAD    01/04/2018  MA-MAMMO SCREENING BILAT W/TRENT W/CAD    Only the first 5 history entries have been loaded, but more history exists.          Discontinued - COLORECTAL CANCER SCREENING  Discontinued    04/18/2014  OCCULT BLOOD FECES IMMUNOASSAY    05/18/2011  COLONOSCOPY (Done)          Discontinued - IMM ZOSTER VACCINES   Discontinued    08/13/2010  Imm Admin: Zoster Vaccine Live (ZVL) (Zostavax) - HISTORICAL DATA                Patient Care Team:  Johana Felix M.D. as PCP - General (Family Medicine)  JENI Hightower as Consulting Physician (Obstetrics & Gynecology)  Ar Diaz M.D. as Consulting Physician (Orthopedic Surgery)  Pop Bobby Jr., M.D. as Consulting Physician (Gastroenterology)  JENI Croft as Consulting Physician (Dermatology)  Pankaj Beal M.D. (Orthopedic Surgery)  JENI Garcia (Inactive) (Orthopedic Surgery)  Beck Ralph M.D. as Attending Team Physician (Ophthalmology)        Social History     Tobacco Use   • Smoking status: Never Smoker   • Smokeless tobacco: Never Used   Vaping Use   • Vaping Use: Never used   Substance Use Topics   • Alcohol use: Yes     Alcohol/week: 2.4 oz     Types: 4 Glasses of wine per week     Comment: 2-4 per week   • Drug use: No     Family History   Problem Relation Age of Onset   • Heart Disease Father         d. 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   Past Surgical History:  "  Procedure Laterality Date   • ORIF, ANKLE  1/6/15    [facility]   • KNEE ARTHROSCOPY  12/29/2011    Performed by BRADFORD WEBB at SUR[facility]ERY [facility]   • MENISCECTOMY, KNEE, MEDIAL  12/29/2011    Performed by BRADFORD WEBB at SUR[facility]ERY [facility]   • HYSTERECTOMY, VAGINAL  1985   • APPENDECTOMY  1980    was in [place]   • LAPAROTOMY  1980    excision uterine fibroid   • HARDWARE REMOVAL ORTHO  1979    left ring finger   • HAND SURGERY  1978    ORIF left ring finger   • CATARACT EXTRACTION WITH IOL  2007, 2008    Bilateral   • TONSILLECTOMY AND ADENOIDECTOMY  as child       Exam:   /62 (BP Location: Right arm, Patient Position: Sitting, BP Cuff Size: Adult)   Pulse 83   Temp (!) 35.6 °C (96.1 °F) (Temporal)   Ht 1.588 m (5' 2.5\")   Wt 55.8 kg (123 lb)   SpO2 97%  Body mass index is 22.14 kg/m².    Hearing good.    Dentition good  Alert, oriented in no acute distress.  Eye contact is good, speech goal directed, affect calm  Trace bilateral pedal edema   Varicose veins and spider veins noted on lower extremities bilaterally.     Assessment and Plan. The following treatment and monitoring plan is recommended:      1. Generalized osteoarthritis  Chronic, stable, controlled with Tylenol, diclofenac gel as needed.  Patient remains physically active.  Symptoms do not interfere with her daily activity.  - diclofenac sodium (VOLTAREN) 1 % Gel; Apply to 2-4 gram to affected area 4 times daily  Dispense: 100 g; Refill: 5  -Ice, rest, activity modification during acute flare of osteoarthritic pain  -Maintain healthy body weight  -Continue regular rehab exercises as tolerated.    2. Osteopenia of multiple sites_advanced  - alendronate (FOSAMAX) 70 MG Tab; Take 1 Tablet by mouth every 7 days.  Dispense: 12 Tablet; Refill: 3  - DS-BONE DENSITY STUDY (DEXA); Future  - Vitamin D: 2000 units per day, maintain serum vitamin D level > 30   - Calcium 600 mg BID  - Weight-bearing, balance, resistance exercises   - " maintain healthy active lifestyle  - avoid or stop smoking  - Limit alcohol consumption to one drink per day  - pt was counseled on fall prevention    - home fall-proofing information provided.      3. Asymptomatic postmenopausal state  - DS-BONE DENSITY STUDY (DEXA); Future    4. Bilateral hip osteoarthritis & femoral acetabular impingement s/p injection on the R by [facility]  Patient has chronic bilateral hip pain secondary to osteoarthritis as well as impingement.  Symptoms are improving with physical therapy and steroid injection.  Patient remain physically active.  Symptoms do not interfere with her daily activity.  -Continue diclofenac gel as needed  -Maintain healthy body weight  -Continue rehab exercises  -Follow-up with physical therapy and [facility] as directed.    5. Raynaud's phenomenon without gangrene  Chronic, improving with amlodipine 5 mg daily no side effect reported, will continue.  - amLODIPine (NORVASC) 5 MG Tab; Take 1 Tablet by mouth every day.  Dispense: 90 Tablet; Refill: 3    6. Pure hypercholesterolemia  Chronic, controlled with Lipitor 10 mg daily, no s/e reported, will continue.    - atorvastatin (LIPITOR) 20 MG Tab; Take 1 Tablet by mouth every day.  Dispense: 90 Tablet; Refill: 3  - CBC WITH DIFFERENTIAL; Future  - Comp Metabolic Panel; Future  - Lipid Profile; Future    7. Gastroesophageal reflux disease without esophagitis  Chronic, controlled with famotidine 20 mg daily, no s/e reported, will continue.    - famotidine (PEPCID) 20 MG Tab; Take 1 Tablet by mouth every day.  Dispense: 90 Tablet; Refill: 3    8. Herpes simplex vulvovaginitis  - valacyclovir (VALTREX) 1 GM Tab; Take 0.5 tablet once daily  Dispense: 45 Tablet; Refill: 3    9. Allergic rhinitis, unspecified seasonality, unspecified trigger  - fluticasone (FLONASE) 50 MCG/ACT nasal spray; Administer 1 Spray into affected nostril(S) every day.  Dispense: 48 g; Refill: 3  - fexofenadine (ALLEGRA) 180 MG tablet; Take 1 Tablet by mouth  every day.  Dispense: 90 Tablet; Refill: 3    10. Trigeminal neuralgia_R  Resolved    11. Presbycusis of both ears  Patient wears hearing aids bilaterally.  She has consistent follow-up with audiology as directed.    12. Medicare annual wellness visit, subsequent  General health and wellness counseling provided.      Vaccines up-to-date    13. Venous insufficiency  History and exams are concerning for venous insufficiency.  - graduated compression stockings, 30-40 mmHg  - legs elevation at rest  - low salt diet  -Maintain healthy body weight, regular exercise as tolerated  - avoid prolonged standing or walking    14. Acute right ankle pain  15. History of fracture of right ankle  History and exams are concerning for acute exacerbation of osteoarthritis pain of the right ankle from overuse as patient recently returned from an extended trip to multiple  countries.  We will order x-ray to rule out occult fractures given history of advanced osteopenia.  -Ice, rest, activity modification  -Over-the-counter analgesics as needed for pain  - DX-ANKLE 3+ VIEWS RIGHT; Future    Services suggested: No services needed at this time  Health Care Screening: Age-appropriate preventive services recommended by USPTF and ACIP covered by Medicare were discussed today. Services ordered if indicated and agreed upon by the patient.  Referrals offered: Community-based lifestyle interventions to reduce health risks and promote self-management and wellness, fall prevention, nutrition, physical activity, tobacco-use cessation, weight loss, and mental health services as per orders if indicated.    Discussion today about general wellness and lifestyle habits:    · Prevent falls and reduce trip hazards; Cautioned about securing or removing rugs.  · Have a working fire alarm and carbon monoxide detector;   · Engage in regular physical activity and social activities     Follow-up: Return in about 1 year (around 7/18/2023) for Annual  wellness visit.

## 2022-07-18 NOTE — PROGRESS NOTES
Subjective:   CC: ***    HPI:     Hermelinda Roger is a 77 y.o. female, established patient of the clinic.     Acute ankle swelling on the right lateral side, onset about 8 weeks ago during long flight back home from [place]   Uncomfortable to wear shoe  Worse with activities toward the end of the day, better in the morning   History of right ankle ORIF in 1/2015, screws removed 11  Months later         Current medicines (including changes today)  Current Outpatient Medications   Medication Sig Dispense Refill   • Magnesium 400 MG Cap Take  by mouth.     • alendronate (FOSAMAX) 70 MG Tab Take 1 tablet by mouth every 7 days. 12 tablet 3   • amLODIPine (NORVASC) 5 MG Tab Take 1 tablet by mouth every day. 90 tablet 3   • fluticasone (FLONASE) 50 MCG/ACT nasal spray Administer 1 Spray into affected nostril(S) every day. 48 g 3   • atorvastatin (LIPITOR) 20 MG Tab Take 1 tablet by mouth every day. 90 tablet 3   • diclofenac sodium 1 % Gel Apply to 2-4 gram to affected area 4 times daily 100 g 5   • valacyclovir (VALTREX) 1 GM Tab Take 0.5 tablet once daily 45 tablet 3   • famotidine (PEPCID) 20 MG Tab Take 20 mg by mouth 1 time daily as needed.     • Cholecalciferol (VITAMIN D) 2000 UNIT Tab Take  by mouth every day.     • CALCIUM 500 + D PO Take  by mouth. 2 daily       No current facility-administered medications for this visit.     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    I reviewed patient's problem  "list, allergies, medications, family hx, social hx with patient and update EPIC.        Objective:     /62 (BP Location: Right arm, Patient Position: Sitting, BP Cuff Size: Adult)   Pulse 83   Temp (!) 35.6 °C (96.1 °F) (Temporal)   Ht 1.588 m (5' 2.5\")   Wt 55.8 kg (123 lb)   SpO2 97%  Body mass index is 22.14 kg/m².    Physical Exam:  Constitutional: awake, alert, in no distress.  Skin: Warm, dry, good turgor, no rashes, bruises, ulcers in visible areas.  Eye: conjunctiva clear, lids neg for edema or lesions.  ENMT: TM and auditory canals wnl. Oral and nasal mucosa wnl. Lips without lesions, good dentition, oropharynx clear.  Neck: Trachea midline, no masses, no thyromegaly. No cervical or supraclavicular lymphadenopathy  Respiratory: Unlabored respiratory effort, lungs clear to auscultation, no wheezes, no rales.  Cardiovascular: Normal S1, S2, no murmur, no pedal edema.  Abdomen: Soft, non-tender to palpation, active BS, no hernia, no hepatosplenomegaly, negative rebound or guarding.   Neuro: CN2-12 grossly intact. Strength 5/5, reflexes 2/4 in all extremities, no sensory deficits.   Psych: Oriented x3, affect and mood wnl, intact judgement and insight.       Assessment and Plan:   The following treatment plan was discussed    There are no diagnoses linked to this encounter.    Johana Felix M.D.      Followup: No follow-ups on file.    Please note that this dictation was created using voice recognition software. I have made every reasonable attempt to correct obvious errors, but I expect that there are errors of grammar and possibly content that I did not discover before finalizing the note.           "

## 2022-10-14 ENCOUNTER — APPOINTMENT (RX ONLY)
Dept: URBAN - METROPOLITAN AREA CLINIC 20 | Facility: CLINIC | Age: 77
Setting detail: DERMATOLOGY
End: 2022-10-14

## 2022-10-14 DIAGNOSIS — L81.4 OTHER MELANIN HYPERPIGMENTATION: ICD-10-CM | Status: STABLE

## 2022-10-14 DIAGNOSIS — L82.1 OTHER SEBORRHEIC KERATOSIS: ICD-10-CM

## 2022-10-14 DIAGNOSIS — D22 MELANOCYTIC NEVI: ICD-10-CM

## 2022-10-14 DIAGNOSIS — D18.0 HEMANGIOMA: ICD-10-CM

## 2022-10-14 DIAGNOSIS — Z71.89 OTHER SPECIFIED COUNSELING: ICD-10-CM

## 2022-10-14 PROBLEM — D22.5 MELANOCYTIC NEVI OF TRUNK: Status: ACTIVE | Noted: 2022-10-14

## 2022-10-14 PROBLEM — D22.62 MELANOCYTIC NEVI OF LEFT UPPER LIMB, INCLUDING SHOULDER: Status: ACTIVE | Noted: 2022-10-14

## 2022-10-14 PROBLEM — D18.01 HEMANGIOMA OF SKIN AND SUBCUTANEOUS TISSUE: Status: ACTIVE | Noted: 2022-10-14

## 2022-10-14 PROBLEM — D22.72 MELANOCYTIC NEVI OF LEFT LOWER LIMB, INCLUDING HIP: Status: ACTIVE | Noted: 2022-10-14

## 2022-10-14 PROBLEM — D22.39 MELANOCYTIC NEVI OF OTHER PARTS OF FACE: Status: ACTIVE | Noted: 2022-10-14

## 2022-10-14 PROBLEM — D22.71 MELANOCYTIC NEVI OF RIGHT LOWER LIMB, INCLUDING HIP: Status: ACTIVE | Noted: 2022-10-14

## 2022-10-14 PROBLEM — D22.61 MELANOCYTIC NEVI OF RIGHT UPPER LIMB, INCLUDING SHOULDER: Status: ACTIVE | Noted: 2022-10-14

## 2022-10-14 PROCEDURE — ? COUNSELING

## 2022-10-14 PROCEDURE — ? OBSERVATION AND MEASURE

## 2022-10-14 PROCEDURE — 99213 OFFICE O/P EST LOW 20 MIN: CPT

## 2022-10-14 PROCEDURE — ? SUNSCREEN RECOMMENDATIONS

## 2022-10-14 ASSESSMENT — LOCATION DETAILED DESCRIPTION DERM
LOCATION DETAILED: RIGHT DISTAL POSTERIOR UPPER ARM
LOCATION DETAILED: RIGHT ANTERIOR DISTAL THIGH
LOCATION DETAILED: LEFT PROXIMAL POSTERIOR UPPER ARM
LOCATION DETAILED: RIGHT DISTAL POSTERIOR THIGH
LOCATION DETAILED: RIGHT PROXIMAL PRETIBIAL REGION
LOCATION DETAILED: LEFT VENTRAL PROXIMAL FOREARM
LOCATION DETAILED: RIGHT SUPERIOR UPPER BACK
LOCATION DETAILED: RIGHT INFERIOR MEDIAL MIDBACK
LOCATION DETAILED: RIGHT INFERIOR CENTRAL MALAR CHEEK
LOCATION DETAILED: RIGHT VENTRAL PROXIMAL FOREARM
LOCATION DETAILED: INFERIOR THORACIC SPINE
LOCATION DETAILED: RIGHT INFERIOR MEDIAL UPPER BACK
LOCATION DETAILED: LEFT LATERAL PROXIMAL PRETIBIAL REGION
LOCATION DETAILED: RIGHT INFERIOR FOREHEAD
LOCATION DETAILED: LEFT DISTAL POSTERIOR THIGH

## 2022-10-14 ASSESSMENT — LOCATION SIMPLE DESCRIPTION DERM
LOCATION SIMPLE: UPPER BACK
LOCATION SIMPLE: LEFT FOREARM
LOCATION SIMPLE: RIGHT FOREARM
LOCATION SIMPLE: RIGHT FOREHEAD
LOCATION SIMPLE: RIGHT POSTERIOR THIGH
LOCATION SIMPLE: LEFT POSTERIOR UPPER ARM
LOCATION SIMPLE: RIGHT UPPER BACK
LOCATION SIMPLE: RIGHT LOWER BACK
LOCATION SIMPLE: LEFT PRETIBIAL REGION
LOCATION SIMPLE: RIGHT THIGH
LOCATION SIMPLE: RIGHT POSTERIOR UPPER ARM
LOCATION SIMPLE: RIGHT CHEEK
LOCATION SIMPLE: RIGHT PRETIBIAL REGION
LOCATION SIMPLE: LEFT POSTERIOR THIGH

## 2022-10-14 ASSESSMENT — LOCATION ZONE DERM
LOCATION ZONE: LEG
LOCATION ZONE: TRUNK
LOCATION ZONE: FACE
LOCATION ZONE: ARM

## 2022-11-11 ENCOUNTER — PATIENT MESSAGE (OUTPATIENT)
Dept: HEALTH INFORMATION MANAGEMENT | Facility: OTHER | Age: 77
End: 2022-11-11

## 2023-04-30 ENCOUNTER — DOCUMENTATION (OUTPATIENT)
Dept: MEDICAL GROUP | Facility: MEDICAL CENTER | Age: 78
End: 2023-04-30
Payer: MEDICARE

## 2023-04-30 NOTE — PROGRESS NOTES
After hours phone call  Call on 4/30/2023 at 8:54 AM from Hermelinda Roger [phone] (home)  who reports PCP: Johana Felix M.D..  Pt reports: testing positive for COVID this morning.   Onset of symptoms was Friday with cough and congestion. Initially thought was allergies. She took some Allegra.   Symptoms were a bit more pronounced this morning along with a headache. The headache is more bothersome than the nasal congestion. She is fully vaccinated. Non tobacco product user. No hx of DM, COPD/asthma. She is not having any SOB or CARLSON, no lethargy, staying hydrated/nutrition. No too keen on Paxlovid-concerned with rebound symptoms.    Home Vitals:  Oxygen saturation on RA 97%.   BP: 132/67  HR:78  Reports afebrile    During our conversation Hermelinda was alert and did not sound in distress.     Plan: We had a long discussion regarding her symptoms and with shared decision making we have decided that given her mild symptoms we will treat symptomatically and hold on Paxlovid for now. I did mention that I am on call the rest of the day and she may call back if her she had changed her mind and would like to have a Rx for Paxlovid. I informed her I will also be forwarding this encounter to her PCP if she needs to reach out this week for further assistance.    MARTHA Louise.

## 2023-05-30 ENCOUNTER — PATIENT MESSAGE (OUTPATIENT)
Dept: MEDICAL GROUP | Age: 78
End: 2023-05-30
Payer: MEDICARE

## 2023-06-09 ENCOUNTER — HOSPITAL ENCOUNTER (OUTPATIENT)
Dept: RADIOLOGY | Facility: MEDICAL CENTER | Age: 78
End: 2023-06-09
Attending: ORTHOPAEDIC SURGERY
Payer: MEDICARE

## 2023-06-09 DIAGNOSIS — M25.551 BILATERAL HIP PAIN: ICD-10-CM

## 2023-06-09 DIAGNOSIS — M25.552 BILATERAL HIP PAIN: ICD-10-CM

## 2023-06-09 PROCEDURE — 72195 MRI PELVIS W/O DYE: CPT

## 2023-06-24 DIAGNOSIS — M85.89 OSTEOPENIA OF MULTIPLE SITES: ICD-10-CM

## 2023-06-24 DIAGNOSIS — A60.04 HERPES SIMPLEX VULVOVAGINITIS: ICD-10-CM

## 2023-06-26 DIAGNOSIS — G89.29 CHRONIC LOW BACK PAIN, UNSPECIFIED BACK PAIN LATERALITY, UNSPECIFIED WHETHER SCIATICA PRESENT: ICD-10-CM

## 2023-06-26 DIAGNOSIS — M16.0 PRIMARY OSTEOARTHRITIS OF BOTH HIPS: ICD-10-CM

## 2023-06-26 DIAGNOSIS — M54.50 CHRONIC LOW BACK PAIN, UNSPECIFIED BACK PAIN LATERALITY, UNSPECIFIED WHETHER SCIATICA PRESENT: ICD-10-CM

## 2023-06-26 RX ORDER — VALACYCLOVIR HYDROCHLORIDE 1 G/1
TABLET, FILM COATED ORAL
Qty: 45 TABLET | Refills: 3 | Status: SHIPPED | OUTPATIENT
Start: 2023-06-26

## 2023-06-26 RX ORDER — ALENDRONATE SODIUM 70 MG/1
TABLET ORAL
Qty: 12 TABLET | Refills: 3 | Status: SHIPPED | OUTPATIENT
Start: 2023-06-26 | End: 2023-07-28

## 2023-07-03 ENCOUNTER — HOSPITAL ENCOUNTER (OUTPATIENT)
Dept: RADIOLOGY | Facility: MEDICAL CENTER | Age: 78
End: 2023-07-03
Attending: FAMILY MEDICINE
Payer: MEDICARE

## 2023-07-03 DIAGNOSIS — E78.00 PURE HYPERCHOLESTEROLEMIA: ICD-10-CM

## 2023-07-03 DIAGNOSIS — Z78.0 ASYMPTOMATIC POSTMENOPAUSAL STATE: ICD-10-CM

## 2023-07-03 DIAGNOSIS — M85.89 OSTEOPENIA OF MULTIPLE SITES: ICD-10-CM

## 2023-07-03 PROCEDURE — 77080 DXA BONE DENSITY AXIAL: CPT

## 2023-07-06 RX ORDER — ATORVASTATIN CALCIUM 20 MG/1
20 TABLET, FILM COATED ORAL
Qty: 90 TABLET | Refills: 3 | Status: SHIPPED | OUTPATIENT
Start: 2023-07-06

## 2023-07-10 ENCOUNTER — HOSPITAL ENCOUNTER (OUTPATIENT)
Dept: LAB | Facility: MEDICAL CENTER | Age: 78
End: 2023-07-10
Attending: FAMILY MEDICINE
Payer: MEDICARE

## 2023-07-10 DIAGNOSIS — E78.00 PURE HYPERCHOLESTEROLEMIA: ICD-10-CM

## 2023-07-10 LAB
ALBUMIN SERPL BCP-MCNC: 4.1 G/DL (ref 3.2–4.9)
ALBUMIN/GLOB SERPL: 1.4 G/DL
ALP SERPL-CCNC: 61 U/L (ref 30–99)
ALT SERPL-CCNC: 10 U/L (ref 2–50)
ANION GAP SERPL CALC-SCNC: 11 MMOL/L (ref 7–16)
AST SERPL-CCNC: 14 U/L (ref 12–45)
BASOPHILS # BLD AUTO: 1 % (ref 0–1.8)
BASOPHILS # BLD: 0.05 K/UL (ref 0–0.12)
BILIRUB SERPL-MCNC: 0.3 MG/DL (ref 0.1–1.5)
BUN SERPL-MCNC: 18 MG/DL (ref 8–22)
CALCIUM ALBUM COR SERPL-MCNC: 9.1 MG/DL (ref 8.5–10.5)
CALCIUM SERPL-MCNC: 9.2 MG/DL (ref 8.5–10.5)
CHLORIDE SERPL-SCNC: 106 MMOL/L (ref 96–112)
CHOLEST SERPL-MCNC: 165 MG/DL (ref 100–199)
CO2 SERPL-SCNC: 25 MMOL/L (ref 20–33)
CREAT SERPL-MCNC: 0.81 MG/DL (ref 0.5–1.4)
EOSINOPHIL # BLD AUTO: 0.35 K/UL (ref 0–0.51)
EOSINOPHIL NFR BLD: 7 % (ref 0–6.9)
ERYTHROCYTE [DISTWIDTH] IN BLOOD BY AUTOMATED COUNT: 45.9 FL (ref 35.9–50)
FASTING STATUS PATIENT QL REPORTED: NORMAL
GFR SERPLBLD CREATININE-BSD FMLA CKD-EPI: 74 ML/MIN/1.73 M 2
GLOBULIN SER CALC-MCNC: 2.9 G/DL (ref 1.9–3.5)
GLUCOSE SERPL-MCNC: 95 MG/DL (ref 65–99)
HCT VFR BLD AUTO: 39.2 % (ref 37–47)
HDLC SERPL-MCNC: 53 MG/DL
HGB BLD-MCNC: 13 G/DL (ref 12–16)
IMM GRANULOCYTES # BLD AUTO: 0.01 K/UL (ref 0–0.11)
IMM GRANULOCYTES NFR BLD AUTO: 0.2 % (ref 0–0.9)
LDLC SERPL CALC-MCNC: 89 MG/DL
LYMPHOCYTES # BLD AUTO: 1.57 K/UL (ref 1–4.8)
LYMPHOCYTES NFR BLD: 31.2 % (ref 22–41)
MCH RBC QN AUTO: 33.2 PG (ref 27–33)
MCHC RBC AUTO-ENTMCNC: 33.2 G/DL (ref 32.2–35.5)
MCV RBC AUTO: 100.3 FL (ref 81.4–97.8)
MONOCYTES # BLD AUTO: 0.64 K/UL (ref 0–0.85)
MONOCYTES NFR BLD AUTO: 12.7 % (ref 0–13.4)
NEUTROPHILS # BLD AUTO: 2.41 K/UL (ref 1.82–7.42)
NEUTROPHILS NFR BLD: 47.9 % (ref 44–72)
NRBC # BLD AUTO: 0 K/UL
NRBC BLD-RTO: 0 /100 WBC (ref 0–0.2)
PLATELET # BLD AUTO: 271 K/UL (ref 164–446)
PMV BLD AUTO: 9.8 FL (ref 9–12.9)
POTASSIUM SERPL-SCNC: 4.3 MMOL/L (ref 3.6–5.5)
PROT SERPL-MCNC: 7 G/DL (ref 6–8.2)
RBC # BLD AUTO: 3.91 M/UL (ref 4.2–5.4)
SODIUM SERPL-SCNC: 142 MMOL/L (ref 135–145)
TRIGL SERPL-MCNC: 113 MG/DL (ref 0–149)
WBC # BLD AUTO: 5 K/UL (ref 4.8–10.8)

## 2023-07-10 PROCEDURE — 80053 COMPREHEN METABOLIC PANEL: CPT

## 2023-07-10 PROCEDURE — 80061 LIPID PANEL: CPT

## 2023-07-10 PROCEDURE — 36415 COLL VENOUS BLD VENIPUNCTURE: CPT

## 2023-07-10 PROCEDURE — 85025 COMPLETE CBC W/AUTO DIFF WBC: CPT

## 2023-07-21 DIAGNOSIS — K21.9 GASTROESOPHAGEAL REFLUX DISEASE WITHOUT ESOPHAGITIS: ICD-10-CM

## 2023-07-24 RX ORDER — FAMOTIDINE 20 MG/1
20 TABLET, FILM COATED ORAL DAILY
Qty: 90 TABLET | Refills: 3 | Status: SHIPPED | OUTPATIENT
Start: 2023-07-24

## 2023-07-27 SDOH — ECONOMIC STABILITY: FOOD INSECURITY: WITHIN THE PAST 12 MONTHS, YOU WORRIED THAT YOUR FOOD WOULD RUN OUT BEFORE YOU GOT MONEY TO BUY MORE.: NEVER TRUE

## 2023-07-27 SDOH — ECONOMIC STABILITY: INCOME INSECURITY: HOW HARD IS IT FOR YOU TO PAY FOR THE VERY BASICS LIKE FOOD, HOUSING, MEDICAL CARE, AND HEATING?: NOT HARD AT ALL

## 2023-07-27 SDOH — ECONOMIC STABILITY: FOOD INSECURITY: WITHIN THE PAST 12 MONTHS, THE FOOD YOU BOUGHT JUST DIDN'T LAST AND YOU DIDN'T HAVE MONEY TO GET MORE.: NEVER TRUE

## 2023-07-27 SDOH — ECONOMIC STABILITY: INCOME INSECURITY: IN THE LAST 12 MONTHS, WAS THERE A TIME WHEN YOU WERE NOT ABLE TO PAY THE MORTGAGE OR RENT ON TIME?: NO

## 2023-07-27 SDOH — HEALTH STABILITY: PHYSICAL HEALTH: ON AVERAGE, HOW MANY DAYS PER WEEK DO YOU ENGAGE IN MODERATE TO STRENUOUS EXERCISE (LIKE A BRISK WALK)?: 6 DAYS

## 2023-07-27 SDOH — ECONOMIC STABILITY: HOUSING INSECURITY: IN THE LAST 12 MONTHS, HOW MANY PLACES HAVE YOU LIVED?: 1

## 2023-07-27 SDOH — HEALTH STABILITY: PHYSICAL HEALTH: ON AVERAGE, HOW MANY MINUTES DO YOU ENGAGE IN EXERCISE AT THIS LEVEL?: 60 MIN

## 2023-07-27 ASSESSMENT — SOCIAL DETERMINANTS OF HEALTH (SDOH)
DO YOU BELONG TO ANY CLUBS OR ORGANIZATIONS SUCH AS CHURCH GROUPS UNIONS, FRATERNAL OR ATHLETIC GROUPS, OR SCHOOL GROUPS?: YES
HOW OFTEN DO YOU ATTEND CHURCH OR RELIGIOUS SERVICES?: MORE THAN 4 TIMES PER YEAR
WITHIN THE PAST 12 MONTHS, YOU WORRIED THAT YOUR FOOD WOULD RUN OUT BEFORE YOU GOT THE MONEY TO BUY MORE: NEVER TRUE
DO YOU BELONG TO ANY CLUBS OR ORGANIZATIONS SUCH AS CHURCH GROUPS UNIONS, FRATERNAL OR ATHLETIC GROUPS, OR SCHOOL GROUPS?: YES
HOW OFTEN DO YOU GET TOGETHER WITH FRIENDS OR RELATIVES?: MORE THAN THREE TIMES A WEEK
IN A TYPICAL WEEK, HOW MANY TIMES DO YOU TALK ON THE PHONE WITH FAMILY, FRIENDS, OR NEIGHBORS?: MORE THAN THREE TIMES A WEEK
HOW OFTEN DO YOU HAVE A DRINK CONTAINING ALCOHOL: 2-3 TIMES A WEEK
HOW OFTEN DO YOU GET TOGETHER WITH FRIENDS OR RELATIVES?: MORE THAN THREE TIMES A WEEK
HOW HARD IS IT FOR YOU TO PAY FOR THE VERY BASICS LIKE FOOD, HOUSING, MEDICAL CARE, AND HEATING?: NOT HARD AT ALL
HOW OFTEN DO YOU ATTEND CHURCH OR RELIGIOUS SERVICES?: MORE THAN 4 TIMES PER YEAR
HOW OFTEN DO YOU ATTENT MEETINGS OF THE CLUB OR ORGANIZATION YOU BELONG TO?: MORE THAN 4 TIMES PER YEAR
IN A TYPICAL WEEK, HOW MANY TIMES DO YOU TALK ON THE PHONE WITH FAMILY, FRIENDS, OR NEIGHBORS?: MORE THAN THREE TIMES A WEEK
HOW OFTEN DO YOU HAVE SIX OR MORE DRINKS ON ONE OCCASION: NEVER
HOW MANY DRINKS CONTAINING ALCOHOL DO YOU HAVE ON A TYPICAL DAY WHEN YOU ARE DRINKING: 1 OR 2
HOW OFTEN DO YOU ATTENT MEETINGS OF THE CLUB OR ORGANIZATION YOU BELONG TO?: MORE THAN 4 TIMES PER YEAR

## 2023-07-27 ASSESSMENT — LIFESTYLE VARIABLES
AUDIT-C TOTAL SCORE: 3
HOW MANY STANDARD DRINKS CONTAINING ALCOHOL DO YOU HAVE ON A TYPICAL DAY: 1 OR 2
SKIP TO QUESTIONS 9-10: 1
HOW OFTEN DO YOU HAVE A DRINK CONTAINING ALCOHOL: 2-3 TIMES A WEEK
HOW OFTEN DO YOU HAVE SIX OR MORE DRINKS ON ONE OCCASION: NEVER

## 2023-07-28 ENCOUNTER — OFFICE VISIT (OUTPATIENT)
Dept: MEDICAL GROUP | Facility: MEDICAL CENTER | Age: 78
End: 2023-07-28
Payer: MEDICARE

## 2023-07-28 VITALS
DIASTOLIC BLOOD PRESSURE: 70 MMHG | TEMPERATURE: 98 F | HEART RATE: 83 BPM | WEIGHT: 119.05 LBS | SYSTOLIC BLOOD PRESSURE: 112 MMHG | OXYGEN SATURATION: 95 % | HEIGHT: 63 IN | BODY MASS INDEX: 21.09 KG/M2

## 2023-07-28 DIAGNOSIS — I10 PRIMARY HYPERTENSION: ICD-10-CM

## 2023-07-28 DIAGNOSIS — M15.9 GENERALIZED OSTEOARTHRITIS: ICD-10-CM

## 2023-07-28 DIAGNOSIS — Z11.59 NEED FOR HEPATITIS C SCREENING TEST: ICD-10-CM

## 2023-07-28 DIAGNOSIS — E78.00 PURE HYPERCHOLESTEROLEMIA: ICD-10-CM

## 2023-07-28 DIAGNOSIS — Z00.00 MEDICARE ANNUAL WELLNESS VISIT, SUBSEQUENT: Primary | ICD-10-CM

## 2023-07-28 DIAGNOSIS — H91.13 PRESBYCUSIS OF BOTH EARS: ICD-10-CM

## 2023-07-28 DIAGNOSIS — M81.0 AGE-RELATED OSTEOPOROSIS WITHOUT CURRENT PATHOLOGICAL FRACTURE: ICD-10-CM

## 2023-07-28 DIAGNOSIS — I87.2 VENOUS INSUFFICIENCY: ICD-10-CM

## 2023-07-28 DIAGNOSIS — K21.9 GASTROESOPHAGEAL REFLUX DISEASE WITHOUT ESOPHAGITIS: ICD-10-CM

## 2023-07-28 DIAGNOSIS — A60.04 HERPES SIMPLEX VULVOVAGINITIS: ICD-10-CM

## 2023-07-28 DIAGNOSIS — J30.9 ALLERGIC RHINITIS, UNSPECIFIED SEASONALITY, UNSPECIFIED TRIGGER: ICD-10-CM

## 2023-07-28 DIAGNOSIS — B35.3 TINEA PEDIS OF RIGHT FOOT: ICD-10-CM

## 2023-07-28 PROCEDURE — 3074F SYST BP LT 130 MM HG: CPT | Performed by: FAMILY MEDICINE

## 2023-07-28 PROCEDURE — 3078F DIAST BP <80 MM HG: CPT | Performed by: FAMILY MEDICINE

## 2023-07-28 PROCEDURE — G0439 PPPS, SUBSEQ VISIT: HCPCS | Performed by: FAMILY MEDICINE

## 2023-07-28 PROCEDURE — 99214 OFFICE O/P EST MOD 30 MIN: CPT | Mod: 25 | Performed by: FAMILY MEDICINE

## 2023-07-28 RX ORDER — FLUTICASONE PROPIONATE 50 MCG
1 SPRAY, SUSPENSION (ML) NASAL DAILY
Qty: 48 G | Refills: 3 | Status: SHIPPED | OUTPATIENT
Start: 2023-07-28

## 2023-07-28 RX ORDER — KETOCONAZOLE 20 MG/G
1 CREAM TOPICAL DAILY
Qty: 30 G | Refills: 0 | Status: SHIPPED | OUTPATIENT
Start: 2023-07-28 | End: 2024-01-30

## 2023-07-28 ASSESSMENT — FIBROSIS 4 INDEX: FIB4 SCORE: 1.29

## 2023-07-28 ASSESSMENT — PATIENT HEALTH QUESTIONNAIRE - PHQ9: CLINICAL INTERPRETATION OF PHQ2 SCORE: 0

## 2023-07-28 ASSESSMENT — ACTIVITIES OF DAILY LIVING (ADL): BATHING_REQUIRES_ASSISTANCE: 0

## 2023-07-28 ASSESSMENT — ENCOUNTER SYMPTOMS: GENERAL WELL-BEING: EXCELLENT

## 2023-07-28 NOTE — PROGRESS NOTES
Chief Complaint   Patient presents with    Annual Exam     AWV       HPI:  Hermelinda is a 78 y.o. here for Medicare Annual Wellness Visit    Patient is working with physical therapy and orthopedic surgeon for management of chronic bilateral hip pain and lower back pain.  She continues to take medication for hypertension, hyperlipidemia, recurrent herpes simplex vulvovaginitis.  She was previously diagnosed with age-related osteoporosis.  Patient has been on Fosamax in 2021.  She tolerates Fosamax well, no side effect reported.  She is also taking calcium and vitamin D daily.  She does weight training couple times per week.  Negative history of fall or bone fracture.  Recent DEXA showed worsening osteoporosis.    She complains of focal area of itchy skin at the lateral right foot.  She tried over-the-counter hydrocortisone and antifungal spray without relief.    Patient Active Problem List    Diagnosis Date Noted    Age-related osteoporosis without current pathological fracture 07/28/2023    Primary hypertension 07/28/2023    Presbycusis of both ears 07/18/2022    Venous insufficiency 07/18/2022    History of fracture of right ankle s/p ORIF in 2015 07/18/2022    Primary osteoarthritis of both hips, CAM impignement, GLORY s/p PT and steroid injection 12/03/2021    Herpes simplex vulvovaginitis 06/22/2017    Generalized osteoarthritis 08/22/2011    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     Pure hypercholesterolemia     Gastroesophageal reflux disease without esophagitis        Current Outpatient Medications   Medication Sig Dispense Refill    Naproxen Sodium (ALEVE PO) Take  by mouth every morning.      diclofenac sodium (VOLTAREN) 1 % Gel Apply to 2-4 gram to affected area 4 times daily 100 g 5    fluticasone (FLONASE) 50 MCG/ACT nasal spray Administer 1 Spray into affected nostril(S) every day. 48 g 3    ketoconazole (NIZORAL) 2 % Cream Apply 1 Application topically every day. 30 g 0    famotidine (PEPCID) 20 MG Tab TAKE 1  TABLET BY MOUTH EVERY DAY 90 Tablet 3    atorvastatin (LIPITOR) 20 MG Tab TAKE 1 TABLET BY MOUTH EVERY DAY 90 Tablet 3    valacyclovir (VALTREX) 1 GM Tab TAKE 0.5 TABLET ONCE DAILY 45 Tablet 3    amLODIPine (NORVASC) 5 MG Tab Take 1 Tablet by mouth every day. 90 Tablet 3    fexofenadine (ALLEGRA) 180 MG tablet Take 1 Tablet by mouth every day. 90 Tablet 3    Magnesium 400 MG Cap Take  by mouth.      Cholecalciferol (VITAMIN D) 2000 UNIT Tab Take  by mouth every day.      CALCIUM 500 + D PO Take  by mouth. 2 daily       No current facility-administered medications for this visit.        Patient is taking medications as noted in medication list.  Current supplements as per medication list.     Allergies: Carbamazepine, Nabumetone, Tape, and Tramadol    Current social contact/activities: swim 3 days a week; yoga; walk; bicycle; mat exercises, and weights     Is patient current with immunizations? Yes.    She  reports that she has never smoked. She has never used smokeless tobacco. She reports current alcohol use of about 2.4 oz of alcohol per week. She reports that she does not use drugs.  Counseling given: Not Answered      ROS:    Gait: Uses no assistive device   Ostomy: No   Other tubes: No   Amputations: No   Chronic oxygen use No   Last eye exam 01/2023   Wears hearing aids: Yes   : Denies any urinary leakage during the last 6 months    Screening:    Depression Screening  Little interest or pleasure in doing things?  0 - not at all  Feeling down, depressed, or hopeless? 0 - not at all  pression   20-27 Severe Depression    Screening for Cognitive Impairment  Three Minute Recall (Banana, Sunrise, Chair)  3/3    Draw clock face with all 12 numbers and set the hands to show 20 past 8.  Yes    If cognitive concerns identified, deferred for follow up unless specifically addressed in assessment and plan.    Fall Risk Assessment  Has the patient had two or more falls in the last year or any fall with injury in the last  year?  No  If fall risk identified, deferred for follow up unless specifically addressed in assessment and plan.    Safety Assessment  Throw rugs on floor.  No  Handrails on all stairs.  No  Good lighting in all hallways.  Yes  Difficulty hearing.  Yes  Patient counseled about all safety risks that were identified.    Functional Assessment ADLs  Are there any barriers preventing you from cooking for yourself or meeting nutritional needs?  No.    Are there any barriers preventing you from driving safely or obtaining transportation?  No.    Are there any barriers preventing you from using a telephone or calling for help?  No.    Are there any barriers preventing you from shopping?  No.    Are there any barriers preventing you from taking care of your own finances?  No.    Are there any barriers preventing you from managing your medications?  No.    Are there any barriers preventing you from showering, bathing or dressing yourself?  No.    Are you currently engaging in any exercise or physical activity?  Yes.     What is your perception of your health?  Excellent.    Advance Care Planning  Do you have an Advance Directive, Living Will, Durable Power of , or POLST? Yes  Advance Directive Living Will     is not on file - instructed patient to bring in a copy to scan into their chart    Health Maintenance Summary            Ordered - HEPATITIS C SCREENING (Once) Ordered on 7/28/2023      No completion history exists for this topic.              Overdue - COVID-19 Vaccine (6 - Pfizer series) Overdue since 1/21/2023 09/21/2022  Imm Admin: PFIZER BIVALENT BOOSTER SARS-COV-2 VACCINE (12+)    04/22/2022  Imm Admin: COVID-19 Vaccine, unspecified - HISTORICAL DATA    10/07/2021  Imm Admin: PFIZER PURPLE CAP SARS-COV-2 VACCINATION (12+)    02/11/2021  Imm Admin: PFIZER PURPLE CAP SARS-COV-2 VACCINATION (12+)    01/21/2021  Imm Admin: PFIZER PURPLE CAP SARS-COV-2 VACCINATION (12+)              IMM INFLUENZA (1) Next  due on 9/1/2023 08/25/2022  Imm Admin: Influenza, Unspecified - HISTORICAL DATA    08/25/2022  Imm Admin: Influenza Vaccine Adult HD    08/18/2021  Imm Admin: Influenza Vaccine Adult HD    08/25/2020  Imm Admin: Influenza Vaccine Adult HD    08/27/2018  Imm Admin: Influenza Vaccine Adult HD    Only the first 5 history entries have been loaded, but more history exists.              Annual Wellness Visit (Every 366 Days) Next due on 7/28/2024 07/28/2023  Visit Dx: Medicare annual wellness visit, subsequent    07/18/2022  Level of Service: UT ANNUAL WELLNESS VISIT-INCLUDES PPPS SUBSEQUE*    07/18/2022  Visit Dx: Medicare annual wellness visit, subsequent    07/15/2021  Level of Service: UT ANNUAL WELLNESS VISIT-INCLUDES PPPS SUBSEQUE*    07/15/2021  Subsequent Annual Wellness Visit - Includes PPPS ()    Only the first 5 history entries have been loaded, but more history exists.              BONE DENSITY (Every 5 Years) Next due on 7/3/2028      07/03/2023  DS-BONE DENSITY STUDY (DEXA)    02/05/2020  DS-BONE DENSITY STUDY (DEXA)    08/22/2016  DS-BONE DENSITY STUDY (DEXA)    06/19/2014  DS-BONE DENSITY STUDY (DEXA)    06/27/2011  DS-BONE DENSITY STUDY (DEXA)    Only the first 5 history entries have been loaded, but more history exists.              IMM DTaP/Tdap/Td Vaccine (3 - Td or Tdap) Next due on 5/25/2031 05/25/2021  Imm Admin: Tdap Vaccine    06/05/2015  Imm Admin: Tdap Vaccine              IMM PNEUMOCOCCAL VACCINE: 65+ Years (Series Information) Completed      04/04/2017  Imm Admin: Pneumococcal polysaccharide vaccine (PPSV-23)    06/05/2015  Imm Admin: Pneumococcal Conjugate Vaccine (Prevnar/PCV-13)              IMM HEP B VACCINE (Series Information) Aged Out      No completion history exists for this topic.              HPV Vaccines (Series Information) Aged Out      No completion history exists for this topic.              IMM MENINGOCOCCAL ACWY VACCINE (Series Information) Aged Out       No completion history exists for this topic.              Discontinued - MAMMOGRAM  Discontinued        Frequency changed to Never automatically (Topic No Longer Applies)    04/04/2022  MA-SCREENING MAMMO BILAT W/TOMOSYNTHESIS W/CAD    04/01/2021  MA-SCREENING MAMMO BILAT W/TOMOSYNTHESIS W/CAD    02/05/2020  MA-SCREENING MAMMO BILAT W/TOMOSYNTHESIS W/CAD    01/07/2019  MA-SCREENING MAMMO BILAT W/TOMOSYNTHESIS W/CAD    Only the first 5 history entries have been loaded, but more history exists.              Discontinued - CERVICAL CANCER SCREENING  Discontinued        Frequency changed to Never automatically (Topic No Longer Applies)    04/02/2014  PAP IG (IMAGE GUIDED)              Discontinued - COLORECTAL CANCER SCREENING  Discontinued        Frequency changed to Never automatically (Topic No Longer Applies)    02/23/2023  AMB EXTERNAL COLONOSCOPY RESULTS    04/18/2014  OCCULT BLOOD FECES IMMUNOASSAY    05/18/2011  COLONOSCOPY (Done)              Discontinued - IMM ZOSTER VACCINES  Discontinued      08/13/2010  Imm Admin: Zoster Vaccine Live (ZVL) (Zostavax) - HISTORICAL DATA                    Patient Care Team:  Johana Felix M.D. as PCP - General (Family Medicine)  JENI Hightower as Consulting Physician (Obstetrics & Gynecology)  Ar Diaz M.D. as Consulting Physician (Orthopedic Surgery)  Pop Bobby Jr., M.D. as Consulting Physician (Gastroenterology)  HELADIO CroftPNIELS as Consulting Physician (Dermatology)  Pankaj Beal M.D. (Orthopedic Surgery)  JENI Garcia (Inactive) (Orthopedic Surgery)  Beck Ralph M.D. as Attending Team Physician (Ophthalmology)    Social History     Tobacco Use    Smoking status: Never    Smokeless tobacco: Never   Vaping Use    Vaping Use: Never used   Substance Use Topics    Alcohol use: Yes     Alcohol/week: 2.4 oz     Types: 4 Glasses of wine per week     Comment: 2-4 per week    Drug use: No     Family History   Problem Relation Age of Onset     "Heart Disease Father         d. 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   Past Surgical History:   Procedure Laterality Date    ORIF, ANKLE  1/6/15    [facility]    KNEE ARTHROSCOPY  12/29/2011    Performed by BRADFORD WEBB at SURGERY [facility]    MENISCECTOMY, KNEE, MEDIAL  12/29/2011    Performed by BRADFORD WEBB at SURGERY [facility]    HYSTERECTOMY, VAGINAL  1985    APPENDECTOMY  1980    was in [facility]    LAPAROTOMY  1980    excision uterine fibroid    HARDWARE REMOVAL ORTHO  1979    left ring finger    HAND SURGERY  1978    ORIF left ring finger    CATARACT EXTRACTION WITH IOL  2007, 2008    Bilateral    TONSILLECTOMY AND ADENOIDECTOMY  as child       Exam:   /70 (BP Location: Left arm, Patient Position: Sitting, BP Cuff Size: Adult)   Pulse 83   Temp 36.7 °C (98 °F) (Temporal)   Ht 1.6 m (5' 3\")   Wt 54 kg (119 lb 0.8 oz)   SpO2 95%  Body mass index is 21.09 kg/m².    Hearing excellent.    Dentition good  Alert, oriented in no acute distress  Eye contact is good, speech goal directed, affect calm  Skin: Dry, scaly, " mildly erythematous skin patch noted at the lateral right foot.    Assessment and Plan. The following treatment and monitoring plan is recommended:      1. Primary hypertension  Chronic, controlled with amlodipine 5 mg daily, no s/e reported, will continue.    - Comp Metabolic Panel; Future  - CBC WITH DIFFERENTIAL; Future  - MICROALBUMIN CREAT RATIO URINE; Future    2. Pure hypercholesterolemia  Chronic, controlled with Lipitor 20 mg daily, no s/e reported, will continue.    - Comp Metabolic Panel; Future  - CBC WITH DIFFERENTIAL; Future  - Lipid Profile; Future    3. Gastroesophageal reflux disease without esophagitis  Chronic, controlled with famotidine 20 mg daily, no s/e reported, will continue.      4. Herpes simplex vulvovaginitis  Patient is on chronic suppression therapy with Valtrex 500 mg daily.  Patient is doing well.  Therapy appears to work.  She wishes to continue.    5. Generalized osteoarthritis  Patient has known generalized arthritis affecting major joints and spine.  Patient is working with physical therapy and orthopedic surgeon.  She recently had MRI of the pelvis that showed chronic impingement of the hip bilaterally, chronic bilateral hip osteoarthritis, and lumbar osteoarthritis.  Patient has appointment with orthopedic surgeon in near future to discuss treatment.  - diclofenac sodium (VOLTAREN) 1 % Gel; Apply to 2-4 gram to affected area 4 times daily  Dispense: 100 g; Refill: 5  -Maintain healthy body weight  -Continue physical therapy  -Regular exercises as tolerated.    6. Venous insufficiency  Chronic, mild, controlled with behavioral modification.    7. Presbycusis of both ears  Patient wears hearing aids bilaterally.    8. Allergic rhinitis, unspecified seasonality, unspecified trigger  Chronic, controlled with Flonase, Allegra, no s/e reported, will continue.    - fluticasone (FLONASE) 50 MCG/ACT nasal spray; Administer 1 Spray into affected nostril(S) every day.  Dispense: 48 g;  Refill: 3    9. Need for hepatitis C screening test  - HEP C VIRUS ANTIBODY; Future    10. Medicare annual wellness visit, subsequent  Labs per orders  Immunization reviewed and discussed.  Patient was counseled about  diet, supplements, exercises.   Preventive cares reviewed, discussed, and updated as appropriate.       11. Age-related osteoporosis without current pathological fracture  Chronic, has been on Fosamax for approximately 2 years.  Recent DEXA scan showed worsening of bone density.  It appears that she failed to respond to Fosamax which she has been on chronically since 2021.  Patient is taking calcium and vitamin D daily.  Negative history of fall or bone fracture.  No side effect reported with Fosamax.  I discussed treatment option with patient.  Patient is interested in Prolia.  We will send order to [facility].  - Vitamin D: 2000 units per day, maintain serum vitamin D level > 30   - Calcium 600 mg BID  - Weight-bearing, balance, resistance exercises   - maintain healthy active lifestyle  - avoid or stop smoking  - Limit alcohol consumption to one drink per day  - pt was counseled on fall prevention    - home fall-proofing information provided.      12. Tinea pedis of right foot  History and exam are concerning for tinea pedis.  - ketoconazole (NIZORAL) 2 % Cream; Apply 1 Application topically every day.  Dispense: 30 g; Refill: 0      Services suggested: No services needed at this time  Health Care Screening recommendations as per orders if indicated.  Referrals offered: PT/OT/Nutrition counseling/Behavioral Health/Smoking cessation as per orders if indicated.    Discussion today about general wellness and lifestyle habits:    Prevent falls and reduce trip hazards; Cautioned about securing or removing rugs.  Have a working fire alarm and carbon monoxide detector;   Engage in regular physical activity and social activities.     Follow-up: Return in about 1 year (around 7/28/2024) for  Annual wellness visit.

## 2023-08-21 DIAGNOSIS — I73.00 RAYNAUD'S PHENOMENON WITHOUT GANGRENE: ICD-10-CM

## 2023-08-22 RX ORDER — AMLODIPINE BESYLATE 5 MG/1
5 TABLET ORAL
Qty: 90 TABLET | Refills: 3 | Status: SHIPPED | OUTPATIENT
Start: 2023-08-22

## 2023-10-16 ENCOUNTER — APPOINTMENT (RX ONLY)
Dept: URBAN - METROPOLITAN AREA CLINIC 20 | Facility: CLINIC | Age: 78
Setting detail: DERMATOLOGY
End: 2023-10-16

## 2023-10-16 DIAGNOSIS — D22 MELANOCYTIC NEVI: ICD-10-CM

## 2023-10-16 DIAGNOSIS — D18.0 HEMANGIOMA: ICD-10-CM

## 2023-10-16 DIAGNOSIS — L82.1 OTHER SEBORRHEIC KERATOSIS: ICD-10-CM

## 2023-10-16 DIAGNOSIS — L81.4 OTHER MELANIN HYPERPIGMENTATION: ICD-10-CM | Status: STABLE

## 2023-10-16 DIAGNOSIS — L73.8 OTHER SPECIFIED FOLLICULAR DISORDERS: ICD-10-CM

## 2023-10-16 DIAGNOSIS — Z71.89 OTHER SPECIFIED COUNSELING: ICD-10-CM

## 2023-10-16 PROBLEM — D22.71 MELANOCYTIC NEVI OF RIGHT LOWER LIMB, INCLUDING HIP: Status: ACTIVE | Noted: 2023-10-16

## 2023-10-16 PROBLEM — D22.39 MELANOCYTIC NEVI OF OTHER PARTS OF FACE: Status: ACTIVE | Noted: 2023-10-16

## 2023-10-16 PROBLEM — D18.01 HEMANGIOMA OF SKIN AND SUBCUTANEOUS TISSUE: Status: ACTIVE | Noted: 2023-10-16

## 2023-10-16 PROBLEM — D22.61 MELANOCYTIC NEVI OF RIGHT UPPER LIMB, INCLUDING SHOULDER: Status: ACTIVE | Noted: 2023-10-16

## 2023-10-16 PROBLEM — D22.72 MELANOCYTIC NEVI OF LEFT LOWER LIMB, INCLUDING HIP: Status: ACTIVE | Noted: 2023-10-16

## 2023-10-16 PROBLEM — D22.62 MELANOCYTIC NEVI OF LEFT UPPER LIMB, INCLUDING SHOULDER: Status: ACTIVE | Noted: 2023-10-16

## 2023-10-16 PROBLEM — D22.5 MELANOCYTIC NEVI OF TRUNK: Status: ACTIVE | Noted: 2023-10-16

## 2023-10-16 PROCEDURE — 99213 OFFICE O/P EST LOW 20 MIN: CPT

## 2023-10-16 PROCEDURE — ? OBSERVATION AND MEASURE

## 2023-10-16 PROCEDURE — ? SUNSCREEN RECOMMENDATIONS

## 2023-10-16 PROCEDURE — ? COUNSELING

## 2023-10-16 ASSESSMENT — LOCATION DETAILED DESCRIPTION DERM
LOCATION DETAILED: RIGHT VENTRAL PROXIMAL FOREARM
LOCATION DETAILED: INFERIOR THORACIC SPINE
LOCATION DETAILED: RIGHT INFERIOR MEDIAL UPPER BACK
LOCATION DETAILED: RIGHT INFERIOR CENTRAL MALAR CHEEK
LOCATION DETAILED: RIGHT PROXIMAL PRETIBIAL REGION
LOCATION DETAILED: LEFT VENTRAL PROXIMAL FOREARM
LOCATION DETAILED: LEFT PROXIMAL POSTERIOR UPPER ARM
LOCATION DETAILED: RIGHT SUPERIOR UPPER BACK
LOCATION DETAILED: LEFT LATERAL PROXIMAL PRETIBIAL REGION
LOCATION DETAILED: RIGHT MEDIAL MALAR CHEEK
LOCATION DETAILED: RIGHT DISTAL POSTERIOR UPPER ARM
LOCATION DETAILED: RIGHT INFERIOR MEDIAL MIDBACK
LOCATION DETAILED: RIGHT ANTERIOR DISTAL THIGH
LOCATION DETAILED: RIGHT DISTAL POSTERIOR THIGH
LOCATION DETAILED: RIGHT INFERIOR FOREHEAD
LOCATION DETAILED: LEFT DISTAL POSTERIOR THIGH

## 2023-10-16 ASSESSMENT — LOCATION SIMPLE DESCRIPTION DERM
LOCATION SIMPLE: RIGHT POSTERIOR UPPER ARM
LOCATION SIMPLE: LEFT PRETIBIAL REGION
LOCATION SIMPLE: RIGHT UPPER BACK
LOCATION SIMPLE: LEFT FOREARM
LOCATION SIMPLE: RIGHT LOWER BACK
LOCATION SIMPLE: RIGHT FOREARM
LOCATION SIMPLE: RIGHT POSTERIOR THIGH
LOCATION SIMPLE: RIGHT FOREHEAD
LOCATION SIMPLE: RIGHT CHEEK
LOCATION SIMPLE: RIGHT PRETIBIAL REGION
LOCATION SIMPLE: LEFT POSTERIOR THIGH
LOCATION SIMPLE: LEFT POSTERIOR UPPER ARM
LOCATION SIMPLE: RIGHT THIGH
LOCATION SIMPLE: UPPER BACK

## 2023-10-16 ASSESSMENT — LOCATION ZONE DERM
LOCATION ZONE: TRUNK
LOCATION ZONE: LEG
LOCATION ZONE: ARM
LOCATION ZONE: FACE

## 2023-11-09 ENCOUNTER — OFFICE VISIT (OUTPATIENT)
Dept: MEDICAL GROUP | Facility: MEDICAL CENTER | Age: 78
End: 2023-11-09
Payer: MEDICARE

## 2023-11-09 VITALS
SYSTOLIC BLOOD PRESSURE: 110 MMHG | BODY MASS INDEX: 21.62 KG/M2 | HEIGHT: 63 IN | DIASTOLIC BLOOD PRESSURE: 72 MMHG | OXYGEN SATURATION: 97 % | TEMPERATURE: 97.6 F | WEIGHT: 122 LBS | HEART RATE: 75 BPM

## 2023-11-09 DIAGNOSIS — R00.2 PALPITATION: ICD-10-CM

## 2023-11-09 PROCEDURE — 3078F DIAST BP <80 MM HG: CPT | Performed by: STUDENT IN AN ORGANIZED HEALTH CARE EDUCATION/TRAINING PROGRAM

## 2023-11-09 PROCEDURE — 3074F SYST BP LT 130 MM HG: CPT | Performed by: STUDENT IN AN ORGANIZED HEALTH CARE EDUCATION/TRAINING PROGRAM

## 2023-11-09 PROCEDURE — 99213 OFFICE O/P EST LOW 20 MIN: CPT | Performed by: STUDENT IN AN ORGANIZED HEALTH CARE EDUCATION/TRAINING PROGRAM

## 2023-11-09 ASSESSMENT — ENCOUNTER SYMPTOMS
FEVER: 0
CHILLS: 0
NAUSEA: 0
ABDOMINAL PAIN: 0
FOCAL WEAKNESS: 0
PALPITATIONS: 1
SHORTNESS OF BREATH: 0
COUGH: 0
DIAPHORESIS: 0
WEIGHT LOSS: 0
VOMITING: 0

## 2023-11-09 ASSESSMENT — FIBROSIS 4 INDEX: FIB4 SCORE: 1.29

## 2023-11-09 NOTE — PROGRESS NOTES
"Subjective:     CC: palpitation, irregular heart beat    HPI:   Hermelinda presents today with    Reports episodes of irregular heart beat, fluttering, palpitation; chest aches after episodes for 5 days.   Episodes are daily, mostly in the afternoon, lasts couple hours, 2-3 episodes during day. Not exercise induced.  H/o LBBB.  Drinks 2-3 cups of 1/2 decaff coffee. Does not drink energy drinks.    ROS:  Review of Systems   Constitutional:  Negative for chills, diaphoresis, fever, malaise/fatigue and weight loss.   Respiratory:  Negative for cough and shortness of breath.    Cardiovascular:  Positive for chest pain and palpitations. Negative for leg swelling.   Gastrointestinal:  Negative for abdominal pain, nausea and vomiting.   Neurological:  Negative for focal weakness.       Objective:     Exam:  /72 (BP Location: Right arm, Patient Position: Sitting, BP Cuff Size: Adult)   Pulse 75   Temp 36.4 °C (97.6 °F) (Temporal)   Ht 1.6 m (5' 3\")   Wt 55.3 kg (122 lb)   LMP 03/01/1986   SpO2 97%   BMI 21.61 kg/m²  Body mass index is 21.61 kg/m².    Physical Exam  Vitals reviewed.   HENT:      Head: Normocephalic.      Mouth/Throat:      Mouth: Mucous membranes are moist.      Pharynx: Oropharynx is clear.   Eyes:      Pupils: Pupils are equal, round, and reactive to light.   Cardiovascular:      Rate and Rhythm: Normal rate and regular rhythm.   Pulmonary:      Effort: Pulmonary effort is normal. No respiratory distress.      Breath sounds: No wheezing or rales.   Abdominal:      General: Abdomen is flat. Bowel sounds are normal. There is no distension.      Tenderness: There is no abdominal tenderness. There is no guarding.   Skin:     General: Skin is warm.   Neurological:      General: No focal deficit present.      Mental Status: She is alert and oriented to person, place, and time.         Accompanied by     Assessment & Plan:     78 y.o. female with the following -     1. Palpitation  New issue.  - " EKG: sinus rhythm, rate 77, LBBB  - TSH WITH REFLEX TO FT4; Future  - proBrain Natriuretic Peptide, NT; Future  - Comp Metabolic Panel; Future  - MAGNESIUM; Future  - EC-ECHOCARDIOGRAM COMPLETE W/O CONT; Future  - Cardiac Event Monitor; Future  - Cardiac Event Monitor; Future  - TROPONIN; Future      Follow up with PCP    Please note that this dictation was created using voice recognition software. I have made every reasonable attempt to correct obvious errors, but I expect that there are errors of grammar and possibly content that I did not discover before finalizing the note.

## 2023-11-10 ENCOUNTER — HOSPITAL ENCOUNTER (OUTPATIENT)
Dept: LAB | Facility: MEDICAL CENTER | Age: 78
End: 2023-11-10
Attending: STUDENT IN AN ORGANIZED HEALTH CARE EDUCATION/TRAINING PROGRAM
Payer: MEDICARE

## 2023-11-10 DIAGNOSIS — R00.2 PALPITATION: ICD-10-CM

## 2023-11-10 LAB
ALBUMIN SERPL BCP-MCNC: 4.1 G/DL (ref 3.2–4.9)
ALBUMIN/GLOB SERPL: 1.4 G/DL
ALP SERPL-CCNC: 80 U/L (ref 30–99)
ALT SERPL-CCNC: 11 U/L (ref 2–50)
ANION GAP SERPL CALC-SCNC: 11 MMOL/L (ref 7–16)
AST SERPL-CCNC: 15 U/L (ref 12–45)
BILIRUB SERPL-MCNC: 0.3 MG/DL (ref 0.1–1.5)
BUN SERPL-MCNC: 29 MG/DL (ref 8–22)
CALCIUM ALBUM COR SERPL-MCNC: 8.9 MG/DL (ref 8.5–10.5)
CALCIUM SERPL-MCNC: 9 MG/DL (ref 8.5–10.5)
CHLORIDE SERPL-SCNC: 105 MMOL/L (ref 96–112)
CO2 SERPL-SCNC: 25 MMOL/L (ref 20–33)
CREAT SERPL-MCNC: 0.67 MG/DL (ref 0.5–1.4)
GFR SERPLBLD CREATININE-BSD FMLA CKD-EPI: 89 ML/MIN/1.73 M 2
GLOBULIN SER CALC-MCNC: 3 G/DL (ref 1.9–3.5)
GLUCOSE SERPL-MCNC: 84 MG/DL (ref 65–99)
MAGNESIUM SERPL-MCNC: 2.5 MG/DL (ref 1.5–2.5)
NT-PROBNP SERPL IA-MCNC: 41 PG/ML (ref 0–125)
POTASSIUM SERPL-SCNC: 4.2 MMOL/L (ref 3.6–5.5)
PROT SERPL-MCNC: 7.1 G/DL (ref 6–8.2)
SODIUM SERPL-SCNC: 141 MMOL/L (ref 135–145)
TROPONIN T SERPL-MCNC: 8 NG/L (ref 6–19)
TSH SERPL DL<=0.005 MIU/L-ACNC: 3.08 UIU/ML (ref 0.38–5.33)

## 2023-11-10 PROCEDURE — 84484 ASSAY OF TROPONIN QUANT: CPT

## 2023-11-10 PROCEDURE — 36415 COLL VENOUS BLD VENIPUNCTURE: CPT

## 2023-11-10 PROCEDURE — 84443 ASSAY THYROID STIM HORMONE: CPT

## 2023-11-10 PROCEDURE — 80053 COMPREHEN METABOLIC PANEL: CPT

## 2023-11-10 PROCEDURE — 83735 ASSAY OF MAGNESIUM: CPT | Mod: GA

## 2023-11-10 PROCEDURE — 83880 ASSAY OF NATRIURETIC PEPTIDE: CPT | Mod: GA

## 2023-11-13 ENCOUNTER — ANCILLARY PROCEDURE (OUTPATIENT)
Dept: CARDIOLOGY | Facility: MEDICAL CENTER | Age: 78
End: 2023-11-13
Attending: STUDENT IN AN ORGANIZED HEALTH CARE EDUCATION/TRAINING PROGRAM
Payer: MEDICARE

## 2023-11-13 DIAGNOSIS — R00.2 PALPITATION: ICD-10-CM

## 2023-11-13 LAB
LV EJECT FRACT  99904: 62
LV EJECT FRACT MOD 2C 99903: 65.86
LV EJECT FRACT MOD 4C 99902: 58.27
LV EJECT FRACT MOD BP 99901: 62.28

## 2023-11-13 PROCEDURE — 93306 TTE W/DOPPLER COMPLETE: CPT | Mod: 26 | Performed by: INTERNAL MEDICINE

## 2023-11-13 PROCEDURE — 93306 TTE W/DOPPLER COMPLETE: CPT

## 2023-11-14 ENCOUNTER — NON-PROVIDER VISIT (OUTPATIENT)
Dept: CARDIOLOGY | Facility: MEDICAL CENTER | Age: 78
End: 2023-11-14
Attending: STUDENT IN AN ORGANIZED HEALTH CARE EDUCATION/TRAINING PROGRAM
Payer: MEDICARE

## 2023-11-14 DIAGNOSIS — I47.10 SVT (SUPRAVENTRICULAR TACHYCARDIA) (HCC): ICD-10-CM

## 2023-11-14 DIAGNOSIS — I45.4: ICD-10-CM

## 2023-11-14 DIAGNOSIS — R00.2 PALPITATION: ICD-10-CM

## 2023-11-14 PROCEDURE — 93246 EXT ECG>7D<15D RECORDING: CPT

## 2023-11-14 NOTE — PROGRESS NOTES
Patient enrolled in the 14 day [facility] Holter monitoring program per Chester Mahajan MD.  >Office hook-up, serial # [id number].  >Currently processing data as of 12/6/23.

## 2023-11-29 ENCOUNTER — PATIENT MESSAGE (OUTPATIENT)
Dept: HEALTH INFORMATION MANAGEMENT | Facility: OTHER | Age: 78
End: 2023-11-29

## 2023-12-08 ENCOUNTER — TELEPHONE (OUTPATIENT)
Dept: CARDIOLOGY | Facility: MEDICAL CENTER | Age: 78
End: 2023-12-08
Payer: MEDICARE

## 2023-12-11 PROCEDURE — 93248 EXT ECG>7D<15D REV&INTERPJ: CPT | Performed by: INTERNAL MEDICINE

## 2024-01-17 ENCOUNTER — TELEPHONE (OUTPATIENT)
Dept: CARDIOLOGY | Facility: MEDICAL CENTER | Age: 79
End: 2024-01-17
Payer: MEDICARE

## 2024-01-17 NOTE — TELEPHONE ENCOUNTER
Did patient answer the phone? NO     Was a voice mail left? YES    Has patient had labs, imaging[facility] or [facility]     Where has patient had labs, imaging, or cardiac testing done?     Did [place] fax for records request? NO     Fax number used to request records

## 2024-01-27 NOTE — PROGRESS NOTES
Pt message please advise :   Dr. Felix, MRI was two weeks ago. No follow-up from Dr Hamilton. In consultation with PT Mary Yanez she feels that I would benefit from Pain Management @ [facility] to address chronic lumbar & hip pain. [facility] requires a referral from you.  Thank you for your consideration, Hermelinda Roger  
Randy Chin(Attending)

## 2024-01-30 ENCOUNTER — OFFICE VISIT (OUTPATIENT)
Dept: CARDIOLOGY | Facility: MEDICAL CENTER | Age: 79
End: 2024-01-30
Attending: INTERNAL MEDICINE
Payer: MEDICARE

## 2024-01-30 VITALS
HEIGHT: 63 IN | WEIGHT: 123 LBS | RESPIRATION RATE: 16 BRPM | SYSTOLIC BLOOD PRESSURE: 114 MMHG | OXYGEN SATURATION: 97 % | BODY MASS INDEX: 21.79 KG/M2 | HEART RATE: 106 BPM | DIASTOLIC BLOOD PRESSURE: 74 MMHG

## 2024-01-30 DIAGNOSIS — I10 PRIMARY HYPERTENSION: ICD-10-CM

## 2024-01-30 DIAGNOSIS — E78.00 PURE HYPERCHOLESTEROLEMIA: ICD-10-CM

## 2024-01-30 DIAGNOSIS — R00.2 PALPITATION: ICD-10-CM

## 2024-01-30 DIAGNOSIS — R93.1 AGATSTON CAC SCORE, <100: ICD-10-CM

## 2024-01-30 LAB — EKG IMPRESSION: NORMAL

## 2024-01-30 PROCEDURE — 99204 OFFICE O/P NEW MOD 45 MIN: CPT | Performed by: INTERNAL MEDICINE

## 2024-01-30 PROCEDURE — 3074F SYST BP LT 130 MM HG: CPT | Performed by: INTERNAL MEDICINE

## 2024-01-30 PROCEDURE — 93010 ELECTROCARDIOGRAM REPORT: CPT | Performed by: INTERNAL MEDICINE

## 2024-01-30 PROCEDURE — 93005 ELECTROCARDIOGRAM TRACING: CPT | Performed by: INTERNAL MEDICINE

## 2024-01-30 PROCEDURE — 99213 OFFICE O/P EST LOW 20 MIN: CPT | Performed by: INTERNAL MEDICINE

## 2024-01-30 PROCEDURE — 3078F DIAST BP <80 MM HG: CPT | Performed by: INTERNAL MEDICINE

## 2024-01-30 ASSESSMENT — ENCOUNTER SYMPTOMS
BLURRED VISION: 0
EYES NEGATIVE: 1
NERVOUS/ANXIOUS: 0
ABDOMINAL PAIN: 0
FEVER: 0
GASTROINTESTINAL NEGATIVE: 1
MYALGIAS: 0
PALPITATIONS: 0
DEPRESSION: 0
CHILLS: 0
NAUSEA: 0
BRUISES/BLEEDS EASILY: 0
DIZZINESS: 0
SHORTNESS OF BREATH: 0
COUGH: 0
WEIGHT LOSS: 0
WEAKNESS: 0
RESPIRATORY NEGATIVE: 1
HEADACHES: 0
PSYCHIATRIC NEGATIVE: 1
FOCAL WEAKNESS: 0
CONSTITUTIONAL NEGATIVE: 1
NEUROLOGICAL NEGATIVE: 1
MUSCULOSKELETAL NEGATIVE: 1
CARDIOVASCULAR NEGATIVE: 1
DOUBLE VISION: 0
VOMITING: 0
CLAUDICATION: 0

## 2024-01-30 ASSESSMENT — FIBROSIS 4 INDEX: FIB4 SCORE: 1.32

## 2024-01-30 NOTE — PROGRESS NOTES
Chief Complaint   Patient presents with    Palpitations     NP Dx       Subjective     Hermelindaquinn Roger is a 78 y.o. female who presents today for new patient establishment for palpitations .     Ms. Roger is a 72 year old female with positive CT coronary calcium study, hypertension and hyperlipidemia, lifelong nonsmoker, family history of coronary artery disease. She was well until 4 months ago when she began to experience palpitations. She describes her palpitations to be moderate palpitation sensations lasting up couple of seconds. She denies associated chest pain, shortness of breath, diaphoresis, nausea and vomiting. She denies aggravating or alleviating factors. She keeps active swimming an hour 3 days per week and walking 2-5 miles per day, exercising at the gyn and with yoga. She is under significant stress currently due to loss of her brother her husbands hospitalization. She was previously seen in our office by Vandana Meyers in 2018.     Past Medical History:   Diagnosis Date    Ankle fracture 01/18/2015    Unstable fracture 2014. Sees Ortho.    Atrophic vaginitis     CAD (coronary artery disease)     CATARACT     Surgically corrected    Chest pain 04/2018    Coronary CT calcium score of 52.3 (LAD and RCA). Echocardiogram with normal LV size, LVEF 65%. No valvular abnormalities.    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 Hamilton    Leg length discrepancy     Lumbar radicular pain 12/15/2012    Osteoarthritis 08/22/2011    Osteopenia     Palpitations     Raynaud's phenomenon     Seasonal allergies     Tinnitus     Varicose vein of leg      Past Surgical History:   Procedure Laterality Date    ORIF, ANKLE  1/6/15     [facility]    KNEE ARTHROSCOPY  12/29/2011    Performed by BRADFORD WEBB at SURGERY [facility] ORS    MENISCECTOMY, KNEE, MEDIAL  12/29/2011    Performed by BRADFORD WEBB at SURGERY [facility] ORS    HYSTERECTOMY, VAGINAL  1985    APPENDECTOMY  1980    was in [facility]    LAPAROTOMY  1980    excision uterine fibroid    HARDWARE REMOVAL ORTHO  1979    left ring finger    HAND SURGERY  1978    ORIF left ring finger    CATARACT EXTRACTION WITH IOL  2007, 2008    Bilateral    TONSILLECTOMY AND ADENOIDECTOMY  as child     Family History   Problem Relation Age of Onset    Heart Disease Father         d. MI age 56    Genitourinary () Problems Mother         CKD    Heart Disease Brother 42        CABG x multi vessel    Diabetes Brother         DM2    No Known Problems Maternal Grandmother     Heart Disease Maternal Grandfather     Diabetes Maternal Grandfather     No Known Problems Paternal Grandmother     Cancer Paternal Grandfather         stomach CA     Social History     Socioeconomic History    Marital status:      Spouse name: Not on file    Number of children: Not on file    Years of education: Not on file    Highest education level: Master's degree (e.g., MA, MS, Elder, MEd, MSW, CECILIA)   Occupational History    Not on file   Tobacco Use    Smoking status: Never    Smokeless tobacco: Never   Vaping Use    Vaping Use: Never used   Substance and Sexual Activity    Alcohol use: Yes     Alcohol/week: 2.4 oz     Types: 4 Glasses of wine per week     Comment: 2-4 per week    Drug use: No    Sexual activity: Yes     Partners: Male   Other Topics Concern    Not on file   Social History Narrative    Swims, does weights, and golfs 4-5 x/week.     . Retired Dental Hygienist.      Social Determinants of Health     Financial Resource Strain: Low Risk  (7/27/2023)    Overall Financial Resource Strain (CARDIA)     Difficulty of Paying Living Expenses: Not hard at all   Food Insecurity: No Food  Insecurity (7/27/2023)    Hunger Vital Sign     Worried About Running Out of Food in the Last Year: Never true     Ran Out of Food in the Last Year: Never true   Transportation Needs: No Transportation Needs (7/27/2023)    PRAPARE - Transportation     Lack of Transportation (Medical): No     Lack of Transportation (Non-Medical): No   Physical Activity: Sufficiently Active (7/27/2023)    Exercise Vital Sign     Days of Exercise per Week: 6 days     Minutes of Exercise per Session: 60 min   Stress: No Stress Concern Present (7/27/2023)    [place] [employer] - Occupational Stress Questionnaire     Feeling of Stress : Not at all   Social Connections: Socially Integrated (7/27/2023)    Social Connection and Isolation Panel [NHANES]     Frequency of Communication with Friends and Family: More than three times a week     Frequency of Social Gatherings with Friends and Family: More than three times a week     Attends Latter-day Services: More than 4 times per year     Active Member of Clubs or Organizations: Yes     Attends Club or Organization Meetings: More than 4 times per year     Marital Status:    Intimate Partner Violence: Not on file   Housing Stability: Low Risk  (7/27/2023)    Housing Stability Vital Sign     Unable to Pay for Housing in the Last Year: No     Number of Places Lived in the Last Year: 1     Unstable Housing in the Last Year: No     Allergies   Allergen Reactions    Carbamazepine Unspecified     Cognetive    Nabumetone      Neutropenia    Tape      adheavise    Tramadol Unspecified     Hard to move after taking     (Medications reviewed.)  Outpatient Encounter Medications as of 1/30/2024   Medication Sig Dispense Refill    Romosozumab-aqqg (EVENITY SC) Inject  under the skin.      diphenhydrAMINE-APAP, sleep, (TYLENOL PM EXTRA STRENGTH PO) Take  by mouth.      amLODIPine (NORVASC) 5 MG Tab TAKE 1 TABLET BY MOUTH EVERY DAY 90 Tablet 3    Naproxen Sodium (ALEVE PO) Take  by  mouth every morning.      diclofenac sodium (VOLTAREN) 1 % Gel Apply to 2-4 gram to affected area 4 times daily 100 g 5    fluticasone (FLONASE) 50 MCG/ACT nasal spray Administer 1 Spray into affected nostril(S) every day. 48 g 3    famotidine (PEPCID) 20 MG Tab TAKE 1 TABLET BY MOUTH EVERY DAY 90 Tablet 3    atorvastatin (LIPITOR) 20 MG Tab TAKE 1 TABLET BY MOUTH EVERY DAY 90 Tablet 3    valacyclovir (VALTREX) 1 GM Tab TAKE 0.5 TABLET ONCE DAILY 45 Tablet 3    fexofenadine (ALLEGRA) 180 MG tablet Take 1 Tablet by mouth every day. (Patient taking differently: Take 180 mg by mouth every day. 1/2 tab a day) 90 Tablet 3    Magnesium 400 MG Cap Take  by mouth.      Cholecalciferol (VITAMIN D) 2000 UNIT Tab Take  by mouth every day.      CALCIUM 500 + D PO Take  by mouth. 2 daily      [DISCONTINUED] ketoconazole (NIZORAL) 2 % Cream Apply 1 Application topically every day. 30 g 0     No facility-administered encounter medications on file as of 1/30/2024.     Review of Systems   Constitutional: Negative.  Negative for chills, fever, malaise/fatigue and weight loss.   HENT: Negative.  Negative for hearing loss.    Eyes: Negative.  Negative for blurred vision and double vision.   Respiratory: Negative.  Negative for cough and shortness of breath.    Cardiovascular: Negative.  Negative for chest pain, palpitations, claudication and leg swelling.   Gastrointestinal: Negative.  Negative for abdominal pain, nausea and vomiting.   Genitourinary: Negative.  Negative for dysuria and urgency.   Musculoskeletal: Negative.  Negative for joint pain and myalgias.   Skin: Negative.  Negative for itching and rash.   Neurological: Negative.  Negative for dizziness, focal weakness, weakness and headaches.   Endo/Heme/Allergies: Negative.  Does not bruise/bleed easily.   Psychiatric/Behavioral: Negative.  Negative for depression. The patient is not nervous/anxious.               Objective     /74 (BP Location: Left arm, Patient  "Position: Sitting, BP Cuff Size: Adult)   Pulse (!) 106   Resp 16   Ht 1.6 m (5' 3\")   Wt 55.8 kg (123 lb)   LMP 03/01/1986   SpO2 97%   BMI 21.79 kg/m²     Physical Exam  Constitutional:       Appearance: Normal appearance. She is well-developed and normal weight.   HENT:      Head: Normocephalic and atraumatic.   Neck:      Vascular: No JVD.   Cardiovascular:      Rate and Rhythm: Normal rate and regular rhythm.      Heart sounds: Normal heart sounds.   Pulmonary:      Effort: Pulmonary effort is normal.      Breath sounds: Normal breath sounds.   Abdominal:      General: Bowel sounds are normal.      Palpations: Abdomen is soft.      Comments: No hepatosplenomegaly.   Musculoskeletal:         General: Normal range of motion.   Lymphadenopathy:      Cervical: No cervical adenopathy.   Skin:     General: Skin is warm and dry.   Neurological:      Mental Status: She is alert and oriented to person, place, and time.            CARDIAC STUDIES/PROCEDURES:    CT CARDIAC SCORING (04/18/18)  Coronary calcification:  LMA - 0.0  LCX - 0.0  LAD - 23.3  RCA - 29.3  PDA - 0.0  Calcium score:  52.3  The visualized portions of the lungs, mediastinum, and upper abdomen are within normal limits.  IMPRESSION:  1.  There is definite, at least mild atherosclerotic plaque burden present.  2.  Minimal to mild coronary stenoses are likely.  3.  The patient's cardiovascular risk is moderate.  4. Aortic atherosclerotic plaque  Calcium score is at the 50th percentile for the patient's age and sex.  (study result reviewed)     ECHOCARDIOGRAM CONCLUSIONS (11/13/23)  Compared to the images of the prior study 04/20/18, there has been no   significant change.   Normal transthoracic echocardiogram.   (study result reviewed)     Laboratory results of (07/10/23) were reviewed. Cholesterol profile of 165/113/53/89 mg/dL noted.     MYOCARDIAL PERFUSION STUDY CONCLUSIONS (03/29/04)  NORMAL SPECT MYOCARDIAL PERFUSION IMAGING.   (study result " reviewed)     CHERIEO REPORT (11/14/23)  13 days of monitoring demonstrates sinus rhythm bundle branch block.  Rare ectopy including paroxysmal SVT lasting up to 29 seconds. Symptoms correlated with ectopy and SVT.   (study result reviewed)     Assessment & Plan     1. Palpitation  EKG      2. Agatston CAC score, <100        3. Primary hypertension        4. Pure hypercholesterolemia            Medical Decision Making: Today's Assessment/Status/Plan:        Palpitations: She is experiencing palpitations as described above. We discussed options of starting beta blockade therapy, however, she does not wish to start any additional mediations at this time. She will continue to work on stress reduction, continue with aerobic exercise, avoidence of caffeine and obtaining adequate amount of sleep.  Positive CT coronary calcium study: She is clinically doing well. We will continue with current medical therapy including atorvastatin.  Hypertension: Blood pressure is well controlled. We will continue with amlodipine (she is on this also for Raynold's)  Hyperlipidemia: She is doing well on statin therapy without myalgia symptoms.     She will keep follow up with Vandana Meyers.

## 2024-02-15 ENCOUNTER — HOSPITAL ENCOUNTER (OUTPATIENT)
Dept: RADIOLOGY | Facility: MEDICAL CENTER | Age: 79
End: 2024-02-15
Attending: NURSE PRACTITIONER
Payer: MEDICARE

## 2024-02-15 DIAGNOSIS — N64.4 BREAST PAIN: ICD-10-CM

## 2024-02-15 PROCEDURE — G0279 TOMOSYNTHESIS, MAMMO: HCPCS

## 2024-02-15 PROCEDURE — 76642 ULTRASOUND BREAST LIMITED: CPT | Mod: RT

## 2024-02-23 RX ORDER — EPINEPHRINE 1 MG/ML(1)
0.5 AMPUL (ML) INJECTION PRN
Status: CANCELLED | OUTPATIENT
Start: 2024-03-14

## 2024-02-23 RX ORDER — METHYLPREDNISOLONE SODIUM SUCCINATE 125 MG/2ML
125 INJECTION, POWDER, LYOPHILIZED, FOR SOLUTION INTRAMUSCULAR; INTRAVENOUS PRN
Status: CANCELLED | OUTPATIENT
Start: 2024-03-14

## 2024-02-23 RX ORDER — DIPHENHYDRAMINE HYDROCHLORIDE 50 MG/ML
50 INJECTION INTRAMUSCULAR; INTRAVENOUS PRN
Status: CANCELLED | OUTPATIENT
Start: 2024-03-14

## 2024-03-11 ENCOUNTER — HOSPITAL ENCOUNTER (OUTPATIENT)
Dept: LAB | Facility: MEDICAL CENTER | Age: 79
End: 2024-03-11
Attending: FAMILY MEDICINE
Payer: MEDICARE

## 2024-03-11 LAB — 25(OH)D3 SERPL-MCNC: 68 NG/ML (ref 30–100)

## 2024-03-11 PROCEDURE — 36415 COLL VENOUS BLD VENIPUNCTURE: CPT

## 2024-03-11 PROCEDURE — 82306 VITAMIN D 25 HYDROXY: CPT

## 2024-03-15 ENCOUNTER — OUTPATIENT INFUSION SERVICES (OUTPATIENT)
Dept: ONCOLOGY | Facility: MEDICAL CENTER | Age: 79
End: 2024-03-15
Attending: FAMILY MEDICINE
Payer: MEDICARE

## 2024-03-15 VITALS
HEIGHT: 63 IN | DIASTOLIC BLOOD PRESSURE: 61 MMHG | RESPIRATION RATE: 18 BRPM | HEART RATE: 80 BPM | SYSTOLIC BLOOD PRESSURE: 122 MMHG | WEIGHT: 121.69 LBS | TEMPERATURE: 97.2 F | BODY MASS INDEX: 21.56 KG/M2 | OXYGEN SATURATION: 98 %

## 2024-03-15 DIAGNOSIS — M81.0 AGE-RELATED OSTEOPOROSIS WITHOUT CURRENT PATHOLOGICAL FRACTURE: ICD-10-CM

## 2024-03-15 LAB
CA-I BLD ISE-SCNC: 1.17 MMOL/L (ref 1.1–1.3)
CREAT BLD-MCNC: 0.8 MG/DL (ref 0.5–1.4)

## 2024-03-15 PROCEDURE — 82565 ASSAY OF CREATININE: CPT

## 2024-03-15 PROCEDURE — 700111 HCHG RX REV CODE 636 W/ 250 OVERRIDE (IP): Mod: JZ,JG | Performed by: FAMILY MEDICINE

## 2024-03-15 PROCEDURE — 96372 THER/PROPH/DIAG INJ SC/IM: CPT

## 2024-03-15 PROCEDURE — 82330 ASSAY OF CALCIUM: CPT

## 2024-03-15 RX ORDER — EPINEPHRINE 1 MG/ML(1)
0.5 AMPUL (ML) INJECTION PRN
OUTPATIENT
Start: 2024-04-12

## 2024-03-15 RX ORDER — METHYLPREDNISOLONE SODIUM SUCCINATE 125 MG/2ML
125 INJECTION, POWDER, LYOPHILIZED, FOR SOLUTION INTRAMUSCULAR; INTRAVENOUS PRN
OUTPATIENT
Start: 2024-04-12

## 2024-03-15 RX ORDER — DIPHENHYDRAMINE HYDROCHLORIDE 50 MG/ML
50 INJECTION INTRAMUSCULAR; INTRAVENOUS PRN
OUTPATIENT
Start: 2024-04-12

## 2024-03-15 RX ADMIN — ROMOSOZUMAB-AQQG 105 MG: 105 INJECTION, SOLUTION SUBCUTANEOUS at 09:05

## 2024-03-15 RX ADMIN — ROMOSOZUMAB-AQQG 105 MG: 105 INJECTION, SOLUTION SUBCUTANEOUS at 09:01

## 2024-03-15 ASSESSMENT — FIBROSIS 4 INDEX: FIB4 SCORE: 1.32

## 2024-03-15 NOTE — PROGRESS NOTES
Pt presents to infusion center for monthly Evenity injection (6 of 12), was previously receiving at [facility] office. Pt denies any current s/s of infection or recent or planned invasive dental surgery. No hx of MI. Pt confirms they are taking appropriate amounts of calcium and Vit D at home and denies any s/s of hypocalcemia. Blood drawn from LAC with 25G butterfly needle, gauze and coban dressing placed. ISTAT i-ca and creat run, results OK for treatment. Evenity given as 2 injections SubQ as ordered in abdomen with no s/s of adverse reaction, bandaids placed. Emailed schedulers for all remaining appts, but has next month, left on foot to self care.

## 2024-04-12 ENCOUNTER — OUTPATIENT INFUSION SERVICES (OUTPATIENT)
Dept: ONCOLOGY | Facility: MEDICAL CENTER | Age: 79
End: 2024-04-12
Attending: FAMILY MEDICINE
Payer: MEDICARE

## 2024-04-12 VITALS
TEMPERATURE: 97.8 F | OXYGEN SATURATION: 97 % | WEIGHT: 122.8 LBS | HEART RATE: 87 BPM | DIASTOLIC BLOOD PRESSURE: 70 MMHG | SYSTOLIC BLOOD PRESSURE: 116 MMHG | BODY MASS INDEX: 21.76 KG/M2 | HEIGHT: 63 IN | RESPIRATION RATE: 18 BRPM

## 2024-04-12 DIAGNOSIS — M81.0 AGE-RELATED OSTEOPOROSIS WITHOUT CURRENT PATHOLOGICAL FRACTURE: ICD-10-CM

## 2024-04-12 LAB
CA-I BLD ISE-SCNC: 1.14 MMOL/L (ref 1.1–1.3)
CREAT BLD-MCNC: 0.8 MG/DL (ref 0.5–1.4)

## 2024-04-12 PROCEDURE — 36415 COLL VENOUS BLD VENIPUNCTURE: CPT

## 2024-04-12 PROCEDURE — 96372 THER/PROPH/DIAG INJ SC/IM: CPT

## 2024-04-12 PROCEDURE — 700111 HCHG RX REV CODE 636 W/ 250 OVERRIDE (IP): Mod: JZ,JG | Performed by: FAMILY MEDICINE

## 2024-04-12 PROCEDURE — 82565 ASSAY OF CREATININE: CPT

## 2024-04-12 PROCEDURE — 82330 ASSAY OF CALCIUM: CPT

## 2024-04-12 RX ORDER — DIPHENHYDRAMINE HYDROCHLORIDE 50 MG/ML
50 INJECTION INTRAMUSCULAR; INTRAVENOUS PRN
OUTPATIENT
Start: 2024-05-10

## 2024-04-12 RX ORDER — EPINEPHRINE 1 MG/ML(1)
0.5 AMPUL (ML) INJECTION PRN
OUTPATIENT
Start: 2024-05-10

## 2024-04-12 RX ORDER — METHYLPREDNISOLONE SODIUM SUCCINATE 125 MG/2ML
125 INJECTION, POWDER, LYOPHILIZED, FOR SOLUTION INTRAMUSCULAR; INTRAVENOUS PRN
OUTPATIENT
Start: 2024-05-10

## 2024-04-12 RX ADMIN — ROMOSOZUMAB-AQQG 105 MG: 105 INJECTION, SOLUTION SUBCUTANEOUS at 09:51

## 2024-04-12 ASSESSMENT — FIBROSIS 4 INDEX: FIB4 SCORE: 1.32

## 2024-04-12 NOTE — PROGRESS NOTES
Patient to [facility] for Evenity injections. Labs drawn by venipuncture in left AC, gauze and coban applied as a dressing. Patient meets parameters for injections. Evenity injected into left and right abdomen. This is 7/12. Patient has appts. Patient to home in care of self.

## 2024-05-03 ENCOUNTER — OFFICE VISIT (OUTPATIENT)
Dept: ENDOCRINOLOGY | Facility: MEDICAL CENTER | Age: 79
End: 2024-05-03
Attending: INTERNAL MEDICINE
Payer: MEDICARE

## 2024-05-03 VITALS
OXYGEN SATURATION: 94 % | HEIGHT: 63 IN | DIASTOLIC BLOOD PRESSURE: 56 MMHG | SYSTOLIC BLOOD PRESSURE: 118 MMHG | HEART RATE: 99 BPM | WEIGHT: 123 LBS | BODY MASS INDEX: 21.79 KG/M2

## 2024-05-03 DIAGNOSIS — M15.9 GENERALIZED OSTEOARTHRITIS: ICD-10-CM

## 2024-05-03 DIAGNOSIS — E55.9 VITAMIN D DEFICIENCY: ICD-10-CM

## 2024-05-03 DIAGNOSIS — R93.89 ABNORMAL FINDINGS ON DIAGNOSTIC IMAGING OF OTHER SPECIFIED BODY STRUCTURES: ICD-10-CM

## 2024-05-03 DIAGNOSIS — M81.0 AGE-RELATED OSTEOPOROSIS WITHOUT CURRENT PATHOLOGICAL FRACTURE: ICD-10-CM

## 2024-05-03 PROCEDURE — 99204 OFFICE O/P NEW MOD 45 MIN: CPT | Performed by: INTERNAL MEDICINE

## 2024-05-03 PROCEDURE — 3078F DIAST BP <80 MM HG: CPT | Performed by: INTERNAL MEDICINE

## 2024-05-03 PROCEDURE — 3074F SYST BP LT 130 MM HG: CPT | Performed by: INTERNAL MEDICINE

## 2024-05-03 ASSESSMENT — FIBROSIS 4 INDEX: FIB4 SCORE: 1.32

## 2024-05-03 NOTE — PROGRESS NOTES
Chief Complaint: Consult requested by Johana Felix M.D. for evaluation of Osteoporosis/Osteopenia/Bone Loss    Subjective:      Hermelinda Roger is a 78 y.o. female who I am asked to see in consultation for evaluation osteoporosis. She was diagnosed with osteoporosis by bone density scan in 7/3/2023 with the lowest T score of -2.6.  Prior to this she was had osteopenia diagnosed in 2009 and she was taking Fosamax but she still had bone loss. She used to live in [facility] and [place] in [place].  She is a runner.  She was a dental hygienist.     Patient admits to history of fracture. (Traumatic fracture of finger in [address], Broke L ankle in 2001 and broke right ankle in 2014 from sports related injuries)   She had a total hysterectomy without oophorectomy in 1985.       Patient denies history of height loss.   The cause of osteoporosis is felt to be due to postmenopausal estrogen deficiency, dietary calcium and/or vitamin D deficiency, and hereditary factors. She is currently being treated with calcium and vitamin D supplementation. She is not currently being treated with bisphosphonates  She is on Evenity which was started on 10/2023 by Dr. Susana Miranda but  stopping giving Evenity in the office thus she switched to getting Evenity at [facility].  She received 5 doses with Dr. Miranda and then 2 doses at [facility].      She has bilateral knee osteoarthritis and meniscus injures seen on MRI.  She has had prior arthroscopic repair of her meniscus injuries by Dr. Hamilton at [facility]        Osteoporosis Risk Factors   Nonmodifiable  Personal Hx of fracture as an adult: Yes but not fragility fractures  Hx of fracture in first-degree relative: no   race: yes  Advanced age: yes  Female sex: yes  Dementia: no  Poor health/frailty: no     Potentially modifiable:  Tobacco use: no  Low body weight (<127 lbs): yes  Estrogen deficiency     early menopause (age <45) or bilateral ovariectomy: no      prolonged premenopausal amenorrhea (>1 yr): no  Low calcium intake (lifelong): no  Alcoholism: no  Recurrent falls: no  Inadequate physical activity: no  She walks several miles a day 3-5 miles.  She lifts weights and swims 3 days a week    Current calcium and Vit D intake:  Dietary sources: Calcium chewies plus MVI - 650mg twice a day for calcium chewy   supplements: Vitamin D3 2000IU daily      Patient's medications, allergies, and social histories were reviewed and updated as appropriate.    Patient's medications, allergies, past medical, surgical, social and family histories were reviewed and updated as appropriate.    ROS:     CONS:     No fever, no chills, no weight loss, no fatigue   EYES:      No diplopia, no blurry vision, no redness of eyes, no swelling of eyelids   ENT:    No hearing loss, No ear pain, No sore throat, no dysphagia, no neck swelling   CV:     No chest pain, no palpitations, no claudication, no orthopnea, no PND   PULM:    No SOB, no cough, no hemoptysis, no wheezing    GI:   No nausea, no vomiting, no diarrhea, no constipation, no bloody stools   :  Passing urine well, no dysuria, no hematuria   ENDO:   No polyuria, no polydipsia, no heat intolerance, no cold intolerance   NEURO: No headaches, no dizziness, no convulsions, no tremors   MUSC:  No joint swellings, no arthralgias, no myalgias, no weakness   SKIN:   No rash, no ulcers, no dry skin   PSYCH:   No depression, no anxiety, no difficulty sleeping       Past Medical History:  Patient Active Problem List    Diagnosis Date Noted    Agatston CAC score, <100 01/30/2024    Age-related osteoporosis without current pathological fracture 07/28/2023    Primary hypertension 07/28/2023    Presbycusis of both ears 07/18/2022    Venous insufficiency 07/18/2022    History of fracture of right ankle s/p ORIF in 2015 07/18/2022    Primary osteoarthritis of both hips, CAM impignement, GLORY s/p PT and steroid injection 12/03/2021    Herpes simplex  vulvovaginitis 06/22/2017    Generalized osteoarthritis 08/22/2011    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     Pure hypercholesterolemia     Gastroesophageal reflux disease without esophagitis        Past Surgical History:  Past Surgical History:   Procedure Laterality Date    ORIF, ANKLE  1/6/15    [facility]    KNEE ARTHROSCOPY  12/29/2011    Performed by BRADFORD WEBB at SURGERY [facility]    MENISCECTOMY, KNEE, MEDIAL  12/29/2011    Performed by BRADFORD WEBB at SURGERY [facility]    HYSTERECTOMY, VAGINAL  1985    APPENDECTOMY  1980    was in [place]    LAPAROTOMY  1980    excision uterine fibroid    HARDWARE REMOVAL ORTHO  1979    left ring finger    HAND SURGERY  1978    ORIF left ring finger    CATARACT EXTRACTION WITH IOL  2007, 2008    Bilateral    TONSILLECTOMY AND ADENOIDECTOMY  as child        Allergies:  Carbamazepine, Nabumetone, Tape, and Tramadol     Current Medications:    Current Outpatient Medications:     Romosozumab-aqqg (EVENITY SC), Inject  under the skin., Disp: , Rfl:     diphenhydrAMINE-APAP, sleep, (TYLENOL PM EXTRA STRENGTH PO), Take  by mouth., Disp: , Rfl:     amLODIPine (NORVASC) 5 MG Tab, TAKE 1 TABLET BY MOUTH EVERY DAY, Disp: 90 Tablet, Rfl: 3    Naproxen Sodium (ALEVE PO), Take  by mouth every morning., Disp: , Rfl:     diclofenac sodium (VOLTAREN) 1 % Gel, Apply to 2-4 gram to affected area 4 times daily, Disp: 100 g, Rfl: 5    fluticasone (FLONASE) 50 MCG/ACT nasal spray, Administer 1 Spray into affected nostril(S) every day., Disp: 48 g, Rfl: 3    famotidine (PEPCID) 20 MG Tab, TAKE 1 TABLET BY MOUTH EVERY DAY, Disp: 90 Tablet, Rfl: 3    atorvastatin (LIPITOR) 20 MG Tab, TAKE 1 TABLET BY MOUTH EVERY DAY, Disp: 90 Tablet, Rfl: 3    valacyclovir (VALTREX) 1 GM Tab, TAKE 0.5 TABLET ONCE DAILY, Disp: 45 Tablet, Rfl: 3    fexofenadine (ALLEGRA) 180 MG tablet, Take 1 Tablet by mouth every day. (Patient taking differently: Take 180 mg by mouth every day. 1/2 tab a day),  Disp: 90 Tablet, Rfl: 3    Magnesium 400 MG Cap, Take  by mouth., Disp: , Rfl:     Cholecalciferol (VITAMIN D) 2000 UNIT Tab, Take  by mouth every day., Disp: , Rfl:     CALCIUM 500 + D PO, Take  by mouth. 2 daily, Disp: , Rfl:     Social History:  Social History     Socioeconomic History    Marital status:      Spouse name: Not on file    Number of children: Not on file    Years of education: Not on file    Highest education level: Master's degree (e.g., MA, MS, Elder, MEd, MSW, CECILIA)   Occupational History    Not on file   Tobacco Use    Smoking status: Never    Smokeless tobacco: Never   Vaping Use    Vaping Use: Never used   Substance and Sexual Activity    Alcohol use: Yes     Alcohol/week: 2.4 oz     Types: 4 Glasses of wine per week     Comment: 2-4 per week    Drug use: No    Sexual activity: Yes     Partners: Male   Other Topics Concern    Not on file   Social History Narrative    Swims, does weights, and golfs 4-5 x/week.     . Retired Dental Hygienist.      Social Determinants of Health     Financial Resource Strain: Low Risk  (7/27/2023)    Overall Financial Resource Strain (CARDIA)     Difficulty of Paying Living Expenses: Not hard at all   Food Insecurity: No Food Insecurity (7/27/2023)    Hunger Vital Sign     Worried About Running Out of Food in the Last Year: Never true     Ran Out of Food in the Last Year: Never true   Transportation Needs: No Transportation Needs (7/27/2023)    PRAPARE - Transportation     Lack of Transportation (Medical): No     Lack of Transportation (Non-Medical): No   Physical Activity: Sufficiently Active (7/27/2023)    Exercise Vital Sign     Days of Exercise per Week: 6 days     Minutes of Exercise per Session: 60 min   Stress: No Stress Concern Present (7/27/2023)    [employer] [place] [employer] - Occupational Stress Questionnaire     Feeling of Stress : Not at all   Social Connections: Socially Integrated (7/27/2023)    Social Connection  "and Isolation Panel [NHANES]     Frequency of Communication with Friends and Family: More than three times a week     Frequency of Social Gatherings with Friends and Family: More than three times a week     Attends Latter-day Services: More than 4 times per year     Active Member of Clubs or Organizations: Yes     Attends Club or Organization Meetings: More than 4 times per year     Marital Status:    Intimate Partner Violence: Not on file   Housing Stability: Low Risk  (7/27/2023)    Housing Stability Vital Sign     Unable to Pay for Housing in the Last Year: No     Number of Places Lived in the Last Year: 1     Unstable Housing in the Last Year: No        Family History:   Family History   Problem Relation Age of Onset    Heart Disease Father         d. MI age 56    Genitourinary () Problems Mother         CKD    Heart Disease Brother 42        CABG x multi vessel    Diabetes Brother         DM2    No Known Problems Maternal Grandmother     Heart Disease Maternal Grandfather     Diabetes Maternal Grandfather     No Known Problems Paternal Grandmother     Cancer Paternal Grandfather         stomach CA         PHYSICAL EXAM:   Vital signs: /56 (BP Location: Right arm, Patient Position: Sitting, BP Cuff Size: Adult)   Pulse 99   Ht 1.6 m (5' 3\")   Wt 55.8 kg (123 lb)   LMP 03/01/1986   SpO2 94%   BMI 21.79 kg/m²   GENERAL: Well-developed, well-nourished  in no apparent distress.   EYE: No ocular and eyelid asymmetry, Anicteric sclerae,  PERRL, No exophthalmos or lidlag  HENT: Hearing grossly intact, Normocephalic, atraumatic. Pink, moist mucous membranes, No exudate  NECK: Supple. Trachea midline. thyroid is normal in size without nodules or tenderness  CARDIOVASCULAR: Regular rate and rhythm. No murmurs, rubs, or gallops.   LUNGS: Clear to auscultation bilaterally   ABDOMEN: Soft, nontender with positive bowel sounds.   EXTREMITIES: No clubbing, cyanosis, or edema.   NEUROLOGICAL: Cranial nerves " "II-XII are grossly intact   Symmetric reflexes at the patella no proximal muscle weakness, No visible tremor with both outstretched hands  LYMPH: No cervical, supraclavicular,  adenopathy palpated.   SKIN: No rashes, lesions. Turgor is normal.    Labs:  Lab Results   Component Value Date/Time    WBC 5.0 07/10/2023 06:15 AM    WBC 4.1 2013 07:59 AM    RBC 3.91 (L) 07/10/2023 06:15 AM    RBC 3.83 2013 07:59 AM    HEMOGLOBIN 13.0 07/10/2023 06:15 AM    .3 (H) 07/10/2023 06:15 AM    MCV 99 (H) 2013 07:59 AM    MCH 33.2 (H) 07/10/2023 06:15 AM    MCH 31.9 2013 07:59 AM    MCHC 33.2 07/10/2023 06:15 AM    RDW 45.9 07/10/2023 06:15 AM    RDW 13.8 2013 07:59 AM    MPV 9.8 07/10/2023 06:15 AM       Lab Results   Component Value Date/Time    SODIUM 141 11/10/2023 06:32 AM    POTASSIUM 4.2 11/10/2023 06:32 AM    CHLORIDE 105 11/10/2023 06:32 AM    CO2 25 11/10/2023 06:32 AM    ANION 11.0 11/10/2023 06:32 AM    GLUCOSE 84 11/10/2023 06:32 AM    BUN 29 (H) 11/10/2023 06:32 AM    CREATININE 0.67 11/10/2023 06:32 AM    CREATININE 0.82 2013 07:59 AM    CALCIUM 9.0 11/10/2023 06:32 AM    ASTSGOT 15 11/10/2023 06:32 AM    ALTSGPT 11 11/10/2023 06:32 AM    TBILIRUBIN 0.3 11/10/2023 06:32 AM    ALBUMIN 4.1 11/10/2023 06:32 AM    TOTPROTEIN 7.1 11/10/2023 06:32 AM    GLOBULIN 3.0 11/10/2023 06:32 AM    AGRATIO 1.4 11/10/2023 06:32 AM       Lab Results   Component Value Date/Time    JEFRY 3.080 11/10/2023 0632     No results found for: \"FREET4\"  No results found for: \"FREET3\"  No results found for: \"THYSTIMIG\"      Imagin/3/2023 7:59 AM     HISTORY/REASON FOR EXAM:  Postmenopausal, hysterectomy, osteopenia of the lumbar spine and left femur, history of fracture     TECHNIQUE/EXAM DESCRIPTION AND NUMBER OF VIEWS:  Dual x-ray bone densitometry was performed from the L1 through L4 levels and from the proximal left femur utilizing the GE Prodigy unit.     COMPARISON: Bone densitometry " scan 2/5/2020     FINDINGS:  The lumbar spine has a mean bone mineral density of 0.864 g/cm2, with a T score of -2.6 and a Z score of -0.5.     The proximal left femur has a mean bone mineral density of 0.733 g/cm2, with a T score of -2.2 and a Z score of 0.0.     When compared with the most recent study dated 2/5/2020, there has been a 2.9% decrease in the bone mineral density of the lumbar spine and a 2.1% decrease in the bone mineral density of the left femur.     IMPRESSION:     According to the World Health Organization classification, bone mineral density of this patient is osteoporotic in the lumbar spine, osteopenic in the left femur with no significant change.     10-year Probability of Fracture:  Major Osteoporotic     21.1%  Hip     6.1%  Population      [place] ()     Based on left femur neck BMD     INTERPRETING LOCATION: [address] [place] [address], [place], [id number]      ASSESSMENT/PLAN:     1. Age-related osteoporosis without current pathological fracture  Unstable she has at high risk for fracture due to previous fracture  Continue Evenity for 5 more doses total of 12 doses  Discussed that we will start consolidation therapy with IV Reclast or oral bisphosphonates after completion of Evenity therapy  Recommend that she complete labs to check bone markers and also rule out other conditions that cause bone loss  I will see her again in 6 months with repeat labs including bone markers    2. Generalized osteoarthritis  Stable continue over-the-counter topical NSAIDs follow-up with primary care    3. Vitamin D deficiency  Stable   Vitamin D labs were reviewed with patient  Continue current supplements  Continue monitoring levels         Return in about 6 months (around 11/3/2024).       This patient during there office visit was started on new medication.  Side effects of new medications were discussed with the patient today in the office. The patient was supplied paperwork on this new  medication.    Thank you kindly for allowing me to participate in the endocrine care plan for this patient.    Victor M Woods MD, FACE, [facility]      CC:   Johana Felix M.D.

## 2024-05-06 ENCOUNTER — HOSPITAL ENCOUNTER (OUTPATIENT)
Dept: LAB | Facility: MEDICAL CENTER | Age: 79
End: 2024-05-06
Attending: INTERNAL MEDICINE
Payer: MEDICARE

## 2024-05-06 DIAGNOSIS — E55.9 VITAMIN D DEFICIENCY: ICD-10-CM

## 2024-05-06 DIAGNOSIS — M15.9 GENERALIZED OSTEOARTHRITIS: ICD-10-CM

## 2024-05-06 DIAGNOSIS — R93.89 ABNORMAL FINDINGS ON DIAGNOSTIC IMAGING OF OTHER SPECIFIED BODY STRUCTURES: ICD-10-CM

## 2024-05-06 DIAGNOSIS — M81.0 AGE-RELATED OSTEOPOROSIS WITHOUT CURRENT PATHOLOGICAL FRACTURE: ICD-10-CM

## 2024-05-06 LAB
ALBUMIN SERPL BCP-MCNC: 4.3 G/DL (ref 3.2–4.9)
ALBUMIN/GLOB SERPL: 1.5 G/DL
ALP SERPL-CCNC: 74 U/L (ref 30–99)
ALT SERPL-CCNC: 12 U/L (ref 2–50)
ANION GAP SERPL CALC-SCNC: 13 MMOL/L (ref 7–16)
AST SERPL-CCNC: 18 U/L (ref 12–45)
BILIRUB SERPL-MCNC: 0.4 MG/DL (ref 0.1–1.5)
BUN SERPL-MCNC: 22 MG/DL (ref 8–22)
CALCIUM ALBUM COR SERPL-MCNC: 8.9 MG/DL (ref 8.5–10.5)
CALCIUM SERPL-MCNC: 9.1 MG/DL (ref 8.5–10.5)
CHLORIDE SERPL-SCNC: 105 MMOL/L (ref 96–112)
CO2 SERPL-SCNC: 23 MMOL/L (ref 20–33)
CREAT SERPL-MCNC: 0.83 MG/DL (ref 0.5–1.4)
GFR SERPLBLD CREATININE-BSD FMLA CKD-EPI: 71 ML/MIN/1.73 M 2
GLOBULIN SER CALC-MCNC: 2.8 G/DL (ref 1.9–3.5)
GLUCOSE SERPL-MCNC: 86 MG/DL (ref 65–99)
PHOSPHATE SERPL-MCNC: 2.7 MG/DL (ref 2.5–4.5)
POTASSIUM SERPL-SCNC: 4.1 MMOL/L (ref 3.6–5.5)
PROT SERPL-MCNC: 7.1 G/DL (ref 6–8.2)
PTH-INTACT SERPL-MCNC: 46.7 PG/ML (ref 14–72)
SODIUM SERPL-SCNC: 141 MMOL/L (ref 135–145)
T4 FREE SERPL-MCNC: 1.05 NG/DL (ref 0.93–1.7)
TSH SERPL DL<=0.005 MIU/L-ACNC: 3.33 UIU/ML (ref 0.38–5.33)

## 2024-05-08 LAB
COLLAGEN CTX SERPL-MCNC: 180 PG/ML
GLIADIN IGA SER IA-ACNC: <0.72 FLU (ref 0–4.99)
TTG IGA SER IA-ACNC: <1.02 FLU (ref 0–4.99)

## 2024-05-09 LAB
ALBUMIN SERPL ELPH-MCNC: 4.17 G/DL (ref 3.75–5.01)
ALPHA1 GLOB SERPL ELPH-MCNC: 0.23 G/DL (ref 0.19–0.46)
ALPHA2 GLOB SERPL ELPH-MCNC: 0.82 G/DL (ref 0.48–1.05)
B-GLOBULIN SERPL ELPH-MCNC: 0.95 G/DL (ref 0.48–1.1)
GAMMA GLOB SERPL ELPH-MCNC: 1.03 G/DL (ref 0.62–1.51)
GLIADIN IGG SER IA-ACNC: <0.56 FLU (ref 0–4.99)
INTERPRETATION SERPL IFE-IMP: NORMAL
MONOCLON BAND OBS SERPL: NORMAL
MONOCLONAL PROTEIN NL11656: NORMAL G/DL
PATHOLOGY STUDY: NORMAL
PROT SERPL-MCNC: 7.2 G/DL (ref 6.3–8.2)
TTG IGG SER IA-ACNC: <0.82 FLU (ref 0–4.99)

## 2024-05-11 LAB
ALBUMIN 24H MFR UR ELPH: 100 %
ALPHA1 GLOB 24H MFR UR ELPH: 0 %
ALPHA2 GLOB 24H MFR UR ELPH: 0 %
B-GLOBULIN 24H MFR UR ELPH: 0 %
COLLECT DURATION TIME SPEC: NORMAL HRS
EER MONOCLONAL PROTEIN STUDY, 24 HOUR U Q5964: NORMAL
GAMMA GLOB 24H MFR UR ELPH: 0 %
INTERPRETATION UR IFE-IMP: NORMAL
M PROTEIN 24H MFR UR ELPH: 0 %
M PROTEIN 24H UR ELPH-MRATE: NORMAL MG/24 HRS
PROT 24H UR-MRATE: NORMAL MG/D (ref 40–150)
PROT UR-MCNC: 4 MG/DL
SPECIMEN VOL ?TM UR: NORMAL ML

## 2024-05-13 ENCOUNTER — OUTPATIENT INFUSION SERVICES (OUTPATIENT)
Dept: ONCOLOGY | Facility: MEDICAL CENTER | Age: 79
End: 2024-05-13
Attending: FAMILY MEDICINE
Payer: MEDICARE

## 2024-05-13 VITALS
OXYGEN SATURATION: 98 % | HEART RATE: 82 BPM | TEMPERATURE: 97.4 F | BODY MASS INDEX: 21.76 KG/M2 | SYSTOLIC BLOOD PRESSURE: 148 MMHG | WEIGHT: 122.8 LBS | HEIGHT: 63 IN | DIASTOLIC BLOOD PRESSURE: 71 MMHG | RESPIRATION RATE: 18 BRPM

## 2024-05-13 DIAGNOSIS — M81.0 AGE-RELATED OSTEOPOROSIS WITHOUT CURRENT PATHOLOGICAL FRACTURE: ICD-10-CM

## 2024-05-13 RX ORDER — METHYLPREDNISOLONE SODIUM SUCCINATE 125 MG/2ML
125 INJECTION, POWDER, LYOPHILIZED, FOR SOLUTION INTRAMUSCULAR; INTRAVENOUS PRN
OUTPATIENT
Start: 2024-06-03

## 2024-05-13 RX ORDER — DIPHENHYDRAMINE HYDROCHLORIDE 50 MG/ML
50 INJECTION INTRAMUSCULAR; INTRAVENOUS PRN
OUTPATIENT
Start: 2024-06-03

## 2024-05-13 RX ORDER — EPINEPHRINE 1 MG/ML(1)
0.5 AMPUL (ML) INJECTION PRN
OUTPATIENT
Start: 2024-06-03

## 2024-05-13 RX ADMIN — ROMOSOZUMAB-AQQG 105 MG: 105 INJECTION, SOLUTION SUBCUTANEOUS at 10:43

## 2024-05-13 ASSESSMENT — FIBROSIS 4 INDEX: FIB4 SCORE: 1.51

## 2024-05-13 NOTE — PROGRESS NOTES
Pt presented to [facility] for Evenity injections. POC discussed and pt verbalized understanding. Pt confirms taking VitD/calcium supplements, denies recent or planned dental/oral procedures in the last month or the next month, and denies s/s of hypocalcemia or infection today. Pt denies history of MI or stroke in the past 12 months. Recent labs reviewed by pharmacy and Evenity injections administered per MAR. Band-aids applied to sites and pt tolerated well. No s/s of adverse reactions or complications noted. Pt confirms next appt, and pt discharged to self care in [facility].

## 2024-05-21 LAB — TEST NAME 95000: NORMAL

## 2024-06-24 ENCOUNTER — OUTPATIENT INFUSION SERVICES (OUTPATIENT)
Dept: ONCOLOGY | Facility: MEDICAL CENTER | Age: 79
End: 2024-06-24
Attending: FAMILY MEDICINE
Payer: MEDICARE

## 2024-06-24 VITALS
HEART RATE: 70 BPM | OXYGEN SATURATION: 97 % | TEMPERATURE: 97 F | BODY MASS INDEX: 21.76 KG/M2 | WEIGHT: 122.8 LBS | DIASTOLIC BLOOD PRESSURE: 75 MMHG | HEIGHT: 63 IN | RESPIRATION RATE: 18 BRPM | SYSTOLIC BLOOD PRESSURE: 133 MMHG

## 2024-06-24 DIAGNOSIS — M81.0 AGE-RELATED OSTEOPOROSIS WITHOUT CURRENT PATHOLOGICAL FRACTURE: ICD-10-CM

## 2024-06-24 LAB
CA-I BLD ISE-SCNC: 1.1 MMOL/L (ref 1.1–1.3)
CREAT BLD-MCNC: 0.9 MG/DL (ref 0.5–1.4)

## 2024-06-24 PROCEDURE — 700111 HCHG RX REV CODE 636 W/ 250 OVERRIDE (IP): Mod: JZ,JG | Performed by: FAMILY MEDICINE

## 2024-06-24 PROCEDURE — 96372 THER/PROPH/DIAG INJ SC/IM: CPT

## 2024-06-24 PROCEDURE — 36415 COLL VENOUS BLD VENIPUNCTURE: CPT

## 2024-06-24 PROCEDURE — 82330 ASSAY OF CALCIUM: CPT

## 2024-06-24 PROCEDURE — 82565 ASSAY OF CREATININE: CPT

## 2024-06-24 RX ORDER — METHYLPREDNISOLONE SODIUM SUCCINATE 125 MG/2ML
125 INJECTION, POWDER, LYOPHILIZED, FOR SOLUTION INTRAMUSCULAR; INTRAVENOUS PRN
OUTPATIENT
Start: 2024-07-08

## 2024-06-24 RX ORDER — DIPHENHYDRAMINE HYDROCHLORIDE 50 MG/ML
50 INJECTION INTRAMUSCULAR; INTRAVENOUS PRN
OUTPATIENT
Start: 2024-07-08

## 2024-06-24 RX ORDER — EPINEPHRINE 1 MG/ML(1)
0.5 AMPUL (ML) INJECTION PRN
OUTPATIENT
Start: 2024-07-08

## 2024-06-24 RX ADMIN — ROMOSOZUMAB-AQQG 105 MG: 105 INJECTION, SOLUTION SUBCUTANEOUS at 11:00

## 2024-06-24 ASSESSMENT — FIBROSIS 4 INDEX: FIB4 SCORE: 1.53

## 2024-06-24 NOTE — PROGRESS NOTES
Pt arrives to [facility] for Evenity injection.  Pt denies any s/sx of infection or recent/planned dental procedures.  Pt denies recent cardiac events or stroke.  ISTAT calcium and creatinine drawn via 25g butterfly needle to L-AC.  Labs reviewed and ok to give Evenity per pharmacy.  Evenity given SC to LLQ and RLQ of abdomen.   Confirmed next appt on 7/29/2024 with pt.  Pt dc home to self care.

## 2024-06-28 DIAGNOSIS — A60.04 HERPES SIMPLEX VULVOVAGINITIS: ICD-10-CM

## 2024-06-28 RX ORDER — VALACYCLOVIR HYDROCHLORIDE 1 G/1
TABLET, FILM COATED ORAL
Qty: 45 TABLET | Refills: 0 | Status: SHIPPED | OUTPATIENT
Start: 2024-06-28

## 2024-07-06 DIAGNOSIS — K21.9 GASTROESOPHAGEAL REFLUX DISEASE WITHOUT ESOPHAGITIS: ICD-10-CM

## 2024-07-06 DIAGNOSIS — I73.00 RAYNAUD'S PHENOMENON WITHOUT GANGRENE: ICD-10-CM

## 2024-07-08 RX ORDER — FAMOTIDINE 20 MG/1
20 TABLET, FILM COATED ORAL DAILY
Qty: 90 TABLET | Refills: 0 | Status: SHIPPED | OUTPATIENT
Start: 2024-07-08 | End: 2024-07-29 | Stop reason: SDUPTHER

## 2024-07-08 RX ORDER — AMLODIPINE BESYLATE 5 MG/1
5 TABLET ORAL
Qty: 90 TABLET | Refills: 0 | Status: SHIPPED | OUTPATIENT
Start: 2024-07-08 | End: 2024-07-29 | Stop reason: SDUPTHER

## 2024-07-10 DIAGNOSIS — E78.00 PURE HYPERCHOLESTEROLEMIA: ICD-10-CM

## 2024-07-11 RX ORDER — ATORVASTATIN CALCIUM 20 MG/1
20 TABLET, FILM COATED ORAL
Qty: 30 TABLET | Refills: 0 | Status: SHIPPED | OUTPATIENT
Start: 2024-07-11 | End: 2024-07-29 | Stop reason: SDUPTHER

## 2024-07-25 SDOH — ECONOMIC STABILITY: INCOME INSECURITY: IN THE LAST 12 MONTHS, WAS THERE A TIME WHEN YOU WERE NOT ABLE TO PAY THE MORTGAGE OR RENT ON TIME?: NO

## 2024-07-25 SDOH — ECONOMIC STABILITY: FOOD INSECURITY: WITHIN THE PAST 12 MONTHS, YOU WORRIED THAT YOUR FOOD WOULD RUN OUT BEFORE YOU GOT MONEY TO BUY MORE.: NEVER TRUE

## 2024-07-25 SDOH — ECONOMIC STABILITY: INCOME INSECURITY: HOW HARD IS IT FOR YOU TO PAY FOR THE VERY BASICS LIKE FOOD, HOUSING, MEDICAL CARE, AND HEATING?: NOT HARD AT ALL

## 2024-07-25 SDOH — ECONOMIC STABILITY: FOOD INSECURITY: WITHIN THE PAST 12 MONTHS, THE FOOD YOU BOUGHT JUST DIDN'T LAST AND YOU DIDN'T HAVE MONEY TO GET MORE.: NEVER TRUE

## 2024-07-25 SDOH — HEALTH STABILITY: PHYSICAL HEALTH: ON AVERAGE, HOW MANY DAYS PER WEEK DO YOU ENGAGE IN MODERATE TO STRENUOUS EXERCISE (LIKE A BRISK WALK)?: 7 DAYS

## 2024-07-25 SDOH — ECONOMIC STABILITY: HOUSING INSECURITY: IN THE LAST 12 MONTHS, HOW MANY PLACES HAVE YOU LIVED?: 1

## 2024-07-25 SDOH — HEALTH STABILITY: PHYSICAL HEALTH: ON AVERAGE, HOW MANY MINUTES DO YOU ENGAGE IN EXERCISE AT THIS LEVEL?: 60 MIN

## 2024-07-25 ASSESSMENT — SOCIAL DETERMINANTS OF HEALTH (SDOH)
IN THE PAST 12 MONTHS, HAS THE ELECTRIC, GAS, OIL, OR WATER COMPANY THREATENED TO SHUT OFF SERVICE IN YOUR HOME?: NO
HOW OFTEN DO YOU ATTENT MEETINGS OF THE CLUB OR ORGANIZATION YOU BELONG TO?: MORE THAN 4 TIMES PER YEAR
DO YOU BELONG TO ANY CLUBS OR ORGANIZATIONS SUCH AS CHURCH GROUPS UNIONS, FRATERNAL OR ATHLETIC GROUPS, OR SCHOOL GROUPS?: YES
WITHIN THE PAST 12 MONTHS, YOU WORRIED THAT YOUR FOOD WOULD RUN OUT BEFORE YOU GOT THE MONEY TO BUY MORE: NEVER TRUE
HOW OFTEN DO YOU ATTEND CHURCH OR RELIGIOUS SERVICES?: MORE THAN 4 TIMES PER YEAR
HOW OFTEN DO YOU HAVE SIX OR MORE DRINKS ON ONE OCCASION: NEVER
HOW OFTEN DO YOU HAVE A DRINK CONTAINING ALCOHOL: 2-3 TIMES A WEEK
HOW OFTEN DO YOU GET TOGETHER WITH FRIENDS OR RELATIVES?: MORE THAN THREE TIMES A WEEK
HOW OFTEN DO YOU ATTENT MEETINGS OF THE CLUB OR ORGANIZATION YOU BELONG TO?: MORE THAN 4 TIMES PER YEAR
HOW HARD IS IT FOR YOU TO PAY FOR THE VERY BASICS LIKE FOOD, HOUSING, MEDICAL CARE, AND HEATING?: NOT HARD AT ALL
HOW OFTEN DO YOU ATTEND CHURCH OR RELIGIOUS SERVICES?: MORE THAN 4 TIMES PER YEAR
IN A TYPICAL WEEK, HOW MANY TIMES DO YOU TALK ON THE PHONE WITH FAMILY, FRIENDS, OR NEIGHBORS?: MORE THAN THREE TIMES A WEEK
HOW MANY DRINKS CONTAINING ALCOHOL DO YOU HAVE ON A TYPICAL DAY WHEN YOU ARE DRINKING: 1 OR 2
HOW OFTEN DO YOU GET TOGETHER WITH FRIENDS OR RELATIVES?: MORE THAN THREE TIMES A WEEK
DO YOU BELONG TO ANY CLUBS OR ORGANIZATIONS SUCH AS CHURCH GROUPS UNIONS, FRATERNAL OR ATHLETIC GROUPS, OR SCHOOL GROUPS?: YES
IN A TYPICAL WEEK, HOW MANY TIMES DO YOU TALK ON THE PHONE WITH FAMILY, FRIENDS, OR NEIGHBORS?: MORE THAN THREE TIMES A WEEK

## 2024-07-25 ASSESSMENT — LIFESTYLE VARIABLES
HOW MANY STANDARD DRINKS CONTAINING ALCOHOL DO YOU HAVE ON A TYPICAL DAY: 1 OR 2
HOW OFTEN DO YOU HAVE SIX OR MORE DRINKS ON ONE OCCASION: NEVER
HOW OFTEN DO YOU HAVE A DRINK CONTAINING ALCOHOL: 2-3 TIMES A WEEK
AUDIT-C TOTAL SCORE: 3
SKIP TO QUESTIONS 9-10: 1

## 2024-07-26 ENCOUNTER — HOSPITAL ENCOUNTER (OUTPATIENT)
Dept: LAB | Facility: MEDICAL CENTER | Age: 79
End: 2024-07-26
Attending: FAMILY MEDICINE
Payer: MEDICARE

## 2024-07-26 DIAGNOSIS — E78.00 PURE HYPERCHOLESTEROLEMIA: ICD-10-CM

## 2024-07-26 DIAGNOSIS — Z11.59 NEED FOR HEPATITIS C SCREENING TEST: ICD-10-CM

## 2024-07-26 DIAGNOSIS — I10 PRIMARY HYPERTENSION: ICD-10-CM

## 2024-07-26 LAB
ALBUMIN SERPL BCP-MCNC: 4.3 G/DL (ref 3.2–4.9)
ALBUMIN/GLOB SERPL: 1.3 G/DL
ALP SERPL-CCNC: 77 U/L (ref 30–99)
ALT SERPL-CCNC: 9 U/L (ref 2–50)
ANION GAP SERPL CALC-SCNC: 11 MMOL/L (ref 7–16)
AST SERPL-CCNC: 18 U/L (ref 12–45)
BASOPHILS # BLD AUTO: 0.9 % (ref 0–1.8)
BASOPHILS # BLD: 0.06 K/UL (ref 0–0.12)
BILIRUB SERPL-MCNC: 0.5 MG/DL (ref 0.1–1.5)
BUN SERPL-MCNC: 17 MG/DL (ref 8–22)
CALCIUM ALBUM COR SERPL-MCNC: 9.3 MG/DL (ref 8.5–10.5)
CALCIUM SERPL-MCNC: 9.5 MG/DL (ref 8.5–10.5)
CHLORIDE SERPL-SCNC: 105 MMOL/L (ref 96–112)
CHOLEST SERPL-MCNC: 205 MG/DL (ref 100–199)
CO2 SERPL-SCNC: 24 MMOL/L (ref 20–33)
CREAT SERPL-MCNC: 0.78 MG/DL (ref 0.5–1.4)
CREAT UR-MCNC: 126.75 MG/DL
EOSINOPHIL # BLD AUTO: 0.32 K/UL (ref 0–0.51)
EOSINOPHIL NFR BLD: 5.1 % (ref 0–6.9)
ERYTHROCYTE [DISTWIDTH] IN BLOOD BY AUTOMATED COUNT: 47.8 FL (ref 35.9–50)
FASTING STATUS PATIENT QL REPORTED: NORMAL
GFR SERPLBLD CREATININE-BSD FMLA CKD-EPI: 77 ML/MIN/1.73 M 2
GLOBULIN SER CALC-MCNC: 3.2 G/DL (ref 1.9–3.5)
GLUCOSE SERPL-MCNC: 98 MG/DL (ref 65–99)
HCT VFR BLD AUTO: 42.6 % (ref 37–47)
HCV AB SER QL: NORMAL
HDLC SERPL-MCNC: 57 MG/DL
HGB BLD-MCNC: 14.1 G/DL (ref 12–16)
IMM GRANULOCYTES # BLD AUTO: 0.01 K/UL (ref 0–0.11)
IMM GRANULOCYTES NFR BLD AUTO: 0.2 % (ref 0–0.9)
LDLC SERPL CALC-MCNC: 120 MG/DL
LYMPHOCYTES # BLD AUTO: 1.95 K/UL (ref 1–4.8)
LYMPHOCYTES NFR BLD: 30.8 % (ref 22–41)
MCH RBC QN AUTO: 33.7 PG (ref 27–33)
MCHC RBC AUTO-ENTMCNC: 33.1 G/DL (ref 32.2–35.5)
MCV RBC AUTO: 101.7 FL (ref 81.4–97.8)
MICROALBUMIN UR-MCNC: 1.4 MG/DL
MICROALBUMIN/CREAT UR: 11 MG/G (ref 0–30)
MONOCYTES # BLD AUTO: 0.8 K/UL (ref 0–0.85)
MONOCYTES NFR BLD AUTO: 12.6 % (ref 0–13.4)
NEUTROPHILS # BLD AUTO: 3.19 K/UL (ref 1.82–7.42)
NEUTROPHILS NFR BLD: 50.4 % (ref 44–72)
NRBC # BLD AUTO: 0 K/UL
NRBC BLD-RTO: 0 /100 WBC (ref 0–0.2)
PLATELET # BLD AUTO: 304 K/UL (ref 164–446)
PMV BLD AUTO: 10 FL (ref 9–12.9)
POTASSIUM SERPL-SCNC: 4.3 MMOL/L (ref 3.6–5.5)
PROT SERPL-MCNC: 7.5 G/DL (ref 6–8.2)
RBC # BLD AUTO: 4.19 M/UL (ref 4.2–5.4)
SODIUM SERPL-SCNC: 140 MMOL/L (ref 135–145)
TRIGL SERPL-MCNC: 142 MG/DL (ref 0–149)
WBC # BLD AUTO: 6.3 K/UL (ref 4.8–10.8)

## 2024-07-26 PROCEDURE — 80053 COMPREHEN METABOLIC PANEL: CPT

## 2024-07-26 PROCEDURE — 86803 HEPATITIS C AB TEST: CPT

## 2024-07-26 PROCEDURE — 80061 LIPID PANEL: CPT

## 2024-07-26 PROCEDURE — 82043 UR ALBUMIN QUANTITATIVE: CPT

## 2024-07-26 PROCEDURE — 85025 COMPLETE CBC W/AUTO DIFF WBC: CPT

## 2024-07-26 PROCEDURE — 36415 COLL VENOUS BLD VENIPUNCTURE: CPT

## 2024-07-26 PROCEDURE — 82570 ASSAY OF URINE CREATININE: CPT

## 2024-07-29 ENCOUNTER — OFFICE VISIT (OUTPATIENT)
Dept: MEDICAL GROUP | Facility: MEDICAL CENTER | Age: 79
End: 2024-07-29
Payer: MEDICARE

## 2024-07-29 ENCOUNTER — OUTPATIENT INFUSION SERVICES (OUTPATIENT)
Dept: ONCOLOGY | Facility: MEDICAL CENTER | Age: 79
End: 2024-07-29
Attending: FAMILY MEDICINE
Payer: MEDICARE

## 2024-07-29 VITALS
HEART RATE: 78 BPM | TEMPERATURE: 97 F | OXYGEN SATURATION: 97 % | BODY MASS INDEX: 21.48 KG/M2 | SYSTOLIC BLOOD PRESSURE: 121 MMHG | RESPIRATION RATE: 18 BRPM | DIASTOLIC BLOOD PRESSURE: 62 MMHG | WEIGHT: 121.25 LBS | HEIGHT: 63 IN

## 2024-07-29 VITALS
SYSTOLIC BLOOD PRESSURE: 110 MMHG | OXYGEN SATURATION: 98 % | DIASTOLIC BLOOD PRESSURE: 66 MMHG | HEIGHT: 63 IN | TEMPERATURE: 97 F | HEART RATE: 89 BPM | WEIGHT: 123.46 LBS | BODY MASS INDEX: 21.88 KG/M2

## 2024-07-29 DIAGNOSIS — E78.00 PURE HYPERCHOLESTEROLEMIA: ICD-10-CM

## 2024-07-29 DIAGNOSIS — I10 PRIMARY HYPERTENSION: ICD-10-CM

## 2024-07-29 DIAGNOSIS — R93.1 AGATSTON CAC SCORE, <100: ICD-10-CM

## 2024-07-29 DIAGNOSIS — I73.00 RAYNAUD'S PHENOMENON WITHOUT GANGRENE: ICD-10-CM

## 2024-07-29 DIAGNOSIS — Z00.00 MEDICARE ANNUAL WELLNESS VISIT, SUBSEQUENT: Primary | ICD-10-CM

## 2024-07-29 DIAGNOSIS — M15.9 GENERALIZED OSTEOARTHRITIS: ICD-10-CM

## 2024-07-29 DIAGNOSIS — K21.9 GASTROESOPHAGEAL REFLUX DISEASE WITHOUT ESOPHAGITIS: ICD-10-CM

## 2024-07-29 DIAGNOSIS — A60.04 HERPES SIMPLEX VULVOVAGINITIS: ICD-10-CM

## 2024-07-29 DIAGNOSIS — M81.0 AGE-RELATED OSTEOPOROSIS WITHOUT CURRENT PATHOLOGICAL FRACTURE: ICD-10-CM

## 2024-07-29 PROCEDURE — 700111 HCHG RX REV CODE 636 W/ 250 OVERRIDE (IP): Mod: JZ,JG | Performed by: FAMILY MEDICINE

## 2024-07-29 PROCEDURE — 96372 THER/PROPH/DIAG INJ SC/IM: CPT

## 2024-07-29 RX ORDER — FAMOTIDINE 20 MG/1
20 TABLET, FILM COATED ORAL DAILY
Qty: 90 TABLET | Refills: 3 | Status: SHIPPED | OUTPATIENT
Start: 2024-07-29

## 2024-07-29 RX ORDER — AMLODIPINE BESYLATE 5 MG/1
5 TABLET ORAL
Qty: 90 TABLET | Refills: 3 | Status: SHIPPED | OUTPATIENT
Start: 2024-07-29

## 2024-07-29 RX ORDER — METHYLPREDNISOLONE SODIUM SUCCINATE 125 MG/2ML
125 INJECTION, POWDER, LYOPHILIZED, FOR SOLUTION INTRAMUSCULAR; INTRAVENOUS PRN
OUTPATIENT
Start: 2024-08-19

## 2024-07-29 RX ORDER — ATORVASTATIN CALCIUM 20 MG/1
20 TABLET, FILM COATED ORAL
Qty: 90 TABLET | Refills: 3 | Status: SHIPPED | OUTPATIENT
Start: 2024-07-29

## 2024-07-29 RX ORDER — DIPHENHYDRAMINE HYDROCHLORIDE 50 MG/ML
50 INJECTION INTRAMUSCULAR; INTRAVENOUS PRN
OUTPATIENT
Start: 2024-08-19

## 2024-07-29 RX ORDER — EPINEPHRINE 1 MG/ML(1)
0.5 AMPUL (ML) INJECTION PRN
OUTPATIENT
Start: 2024-08-19

## 2024-07-29 RX ADMIN — ROMOSOZUMAB-AQQG 105 MG: 105 INJECTION, SOLUTION SUBCUTANEOUS at 10:51

## 2024-07-29 RX ADMIN — ROMOSOZUMAB-AQQG 105 MG: 105 INJECTION, SOLUTION SUBCUTANEOUS at 10:53

## 2024-07-29 ASSESSMENT — FIBROSIS 4 INDEX
FIB4 SCORE: 1.578947368421052632
FIB4 SCORE: 1.578947368421052632

## 2024-07-29 ASSESSMENT — ACTIVITIES OF DAILY LIVING (ADL): BATHING_REQUIRES_ASSISTANCE: 0

## 2024-07-29 ASSESSMENT — PATIENT HEALTH QUESTIONNAIRE - PHQ9: CLINICAL INTERPRETATION OF PHQ2 SCORE: 0

## 2024-07-29 ASSESSMENT — ENCOUNTER SYMPTOMS: GENERAL WELL-BEING: EXCELLENT

## 2024-07-30 PROBLEM — I87.2 VENOUS INSUFFICIENCY: Status: RESOLVED | Noted: 2022-07-18 | Resolved: 2024-07-30

## 2024-08-16 DIAGNOSIS — M15.9 GENERALIZED OSTEOARTHRITIS: ICD-10-CM

## 2024-08-20 ENCOUNTER — OFFICE VISIT (OUTPATIENT)
Dept: CARDIOLOGY | Facility: MEDICAL CENTER | Age: 79
End: 2024-08-20
Attending: NURSE PRACTITIONER
Payer: MEDICARE

## 2024-08-20 VITALS
HEIGHT: 63 IN | HEART RATE: 90 BPM | RESPIRATION RATE: 16 BRPM | DIASTOLIC BLOOD PRESSURE: 58 MMHG | BODY MASS INDEX: 21.02 KG/M2 | WEIGHT: 118.6 LBS | SYSTOLIC BLOOD PRESSURE: 110 MMHG | OXYGEN SATURATION: 96 %

## 2024-08-20 DIAGNOSIS — E78.00 PURE HYPERCHOLESTEROLEMIA: ICD-10-CM

## 2024-08-20 DIAGNOSIS — R00.2 PALPITATION: ICD-10-CM

## 2024-08-20 DIAGNOSIS — I10 PRIMARY HYPERTENSION: ICD-10-CM

## 2024-08-20 PROCEDURE — 3074F SYST BP LT 130 MM HG: CPT | Performed by: NURSE PRACTITIONER

## 2024-08-20 PROCEDURE — 99213 OFFICE O/P EST LOW 20 MIN: CPT | Performed by: NURSE PRACTITIONER

## 2024-08-20 PROCEDURE — 99214 OFFICE O/P EST MOD 30 MIN: CPT | Performed by: NURSE PRACTITIONER

## 2024-08-20 PROCEDURE — 3078F DIAST BP <80 MM HG: CPT | Performed by: NURSE PRACTITIONER

## 2024-08-20 ASSESSMENT — FIBROSIS 4 INDEX: FIB4 SCORE: 1.578947368421052632

## 2024-08-20 NOTE — PROGRESS NOTES
Family Nurse Practitioner Student Note    Cosigner/Preceptor: HARSHA Doss    Chief Complaint:                                                                                                                                 Chief Complaint   Patient presents with    Palpitations    HTN (Controlled)    Hyperlipidemia        HPI:   Hermelinda Roger is a 79 y.o. female who presents today for follow up on her palpitations.      Ms. Roger is an 79 year old female with positive CT coronary calcium study, hypertension and hyperlipidemia, lifelong nonsmoker, family history of coronary artery disease.     She began to experience palpitations in 2018. She describes her palpitations to be moderate palpitation sensations lasting up couple of seconds. She denies associated chest pain, shortness of breath, diaphoresis, nausea and vomiting. She believes her palpitations are due to anxiety.     She keeps active swimming an hour 3 days per week and walking 2-5 miles per day, exercising at the gyn and with yoga. She had some significant stressors in her life last year due to multiple losses in the family. This year has been better for her and she recently went to [facility] [place], and [place]. She has an upcoming trip to [place] she is currently preparing for.    Current Outpatient Medications   Medication Sig Dispense Refill    diclofenac sodium (VOLTAREN) 1 % Gel APPLY TO 2-4 GRAM TO AFFECTED AREA 4 TIMES DAILY 100 g 0    amLODIPine (NORVASC) 5 MG Tab Take 1 Tablet by mouth every day. 90 Tablet 3    atorvastatin (LIPITOR) 20 MG Tab Take 1 Tablet by mouth every day. 90 Tablet 3    famotidine (PEPCID) 20 MG Tab Take 1 Tablet by mouth every day. 90 Tablet 3    valacyclovir (VALTREX) 1 GM Tab Take 0.5 tablet once daily 45 Tablet 0    Romosozumab-aqqg (EVENITY SC) Inject  under the skin.      fluticasone (FLONASE) 50 MCG/ACT nasal spray Administer 1 Spray into affected nostril(S) every day. 48 g 3    fexofenadine (ALLEGRA) 180 MG  "tablet Take 1 Tablet by mouth every day. (Patient taking differently: Take 180 mg by mouth every day. 1/2 tab a day) 90 Tablet 3    Magnesium 400 MG Cap Take  by mouth.      Cholecalciferol (VITAMIN D) 2000 UNIT Tab Take  by mouth every day.      CALCIUM 500 + D PO Take  by mouth. 2 daily       No current facility-administered medications for this visit.       Allergies as of 08/20/2024 - Reviewed 08/20/2024   Allergen Reaction Noted    Carbamazepine Unspecified 01/14/2021    Nabumetone  06/05/2014    Tape  08/22/2009    Tramadol Unspecified 09/01/2016        ROS:  All systems negative expect as addressed in assessment and plan.     Assessment:  /58 (BP Location: Left arm, Patient Position: Sitting, BP Cuff Size: Adult)   Pulse 90   Resp 16   Ht 1.61 m (5' 3.39\")   Wt 53.8 kg (118 lb 9.6 oz)   LMP 03/01/1986   SpO2 96%   BMI 20.75 kg/m²     Physical Exam  Constitutional:       Appearance: Normal appearance.   HENT:      Head: Normocephalic.      Nose: Nose normal.   Cardiovascular:      Rate and Rhythm: Normal rate and regular rhythm.      Pulses: Normal pulses.      Heart sounds: Normal heart sounds, S1 normal and S2 normal.   Pulmonary:      Effort: Pulmonary effort is normal.      Breath sounds: Normal breath sounds.   Musculoskeletal:         General: Normal range of motion.      Cervical back: Normal range of motion.      Right lower leg: No edema.      Left lower leg: No edema.   Skin:     General: Skin is warm and dry.   Neurological:      Mental Status: She is alert and oriented to person, place, and time.   Psychiatric:         Mood and Affect: Mood normal.         Behavior: Behavior normal.         Thought Content: Thought content normal.         Judgment: Judgment normal.         Plan:  79 y.o. female with the following issues.    1. Pure hypercholesterolemia      Chronic    Had worsened on last Lipid Profile with LDL of 120. She believes this is due to her recent diet on her vacations. She " has cleaned up her diet and lost the weight she had gained. Her lipid levels will be rechecked in 6 months when she follows up with her primary care provider      2. Palpitation      Patient has had a mild increase in her palpitations in the last year or so. She feels this is due to increased stress. She is managing her stress at this time and is not interested in any medications currently. She will let us know if her symptoms worsen or she would like to trial medications.      3. Primary hypertension      Chronic Stable    Patient was concerned about low blood pressure today. She checks her blood pressure at home with an average SBP of 110. Denies any dizziness. Advised that her blood pressure is great as long as she is symptomatic. Advised to let us know if her blood pressure starts running lower than 100 SBP.           Return in about 1 year (around 8/20/2025) for FU with AB.

## 2024-08-30 ENCOUNTER — OUTPATIENT INFUSION SERVICES (OUTPATIENT)
Dept: ONCOLOGY | Facility: MEDICAL CENTER | Age: 79
End: 2024-08-30
Attending: FAMILY MEDICINE
Payer: MEDICARE

## 2024-08-30 VITALS
OXYGEN SATURATION: 98 % | DIASTOLIC BLOOD PRESSURE: 66 MMHG | WEIGHT: 123.24 LBS | TEMPERATURE: 97.3 F | BODY MASS INDEX: 21.84 KG/M2 | HEART RATE: 65 BPM | HEIGHT: 63 IN | SYSTOLIC BLOOD PRESSURE: 121 MMHG | RESPIRATION RATE: 17 BRPM

## 2024-08-30 DIAGNOSIS — M81.0 AGE-RELATED OSTEOPOROSIS WITHOUT CURRENT PATHOLOGICAL FRACTURE: ICD-10-CM

## 2024-08-30 LAB
CA-I BLD ISE-SCNC: 1.2 MMOL/L (ref 1.1–1.3)
CREAT BLD-MCNC: 0.8 MG/DL (ref 0.5–1.4)

## 2024-08-30 PROCEDURE — 700111 HCHG RX REV CODE 636 W/ 250 OVERRIDE (IP): Mod: JZ,JG | Performed by: FAMILY MEDICINE

## 2024-08-30 PROCEDURE — 82330 ASSAY OF CALCIUM: CPT

## 2024-08-30 PROCEDURE — 96372 THER/PROPH/DIAG INJ SC/IM: CPT

## 2024-08-30 PROCEDURE — 82565 ASSAY OF CREATININE: CPT

## 2024-08-30 RX ORDER — EPINEPHRINE 1 MG/ML(1)
0.5 AMPUL (ML) INJECTION PRN
OUTPATIENT
Start: 2024-09-27

## 2024-08-30 RX ORDER — DIPHENHYDRAMINE HYDROCHLORIDE 50 MG/ML
50 INJECTION INTRAMUSCULAR; INTRAVENOUS PRN
OUTPATIENT
Start: 2024-09-27

## 2024-08-30 RX ORDER — METHYLPREDNISOLONE SODIUM SUCCINATE 125 MG/2ML
125 INJECTION, POWDER, LYOPHILIZED, FOR SOLUTION INTRAMUSCULAR; INTRAVENOUS PRN
OUTPATIENT
Start: 2024-09-27

## 2024-08-30 RX ADMIN — ROMOSOZUMAB-AQQG 105 MG: 105 INJECTION, SOLUTION SUBCUTANEOUS at 13:46

## 2024-08-30 RX ADMIN — ROMOSOZUMAB-AQQG 105 MG: 105 INJECTION, SOLUTION SUBCUTANEOUS at 13:47

## 2024-08-30 ASSESSMENT — FIBROSIS 4 INDEX: FIB4 SCORE: 1.578947368421052632

## 2024-08-30 NOTE — PROGRESS NOTES
Pt arrives to [facility] for Evenity injection.  Pt denies any s/sx of infection or recent/planned dental procedures.  Pt denies recent cardiac events or stroke.  Labs drawn from left AC and within parameters to give Evenity per pharmacy.  Evenity given SC to LLQ and RLQ of abdomen.   Confirmed next appt on 9/27/2024 with pt. Email sent to scheduling dept to adjust future appts.  Pt will view appt times on her my chart.  Pt dc home to self care.

## 2024-09-16 ENCOUNTER — TELEPHONE (OUTPATIENT)
Dept: ENDOCRINOLOGY | Facility: MEDICAL CENTER | Age: 79
End: 2024-09-16
Payer: MEDICARE

## 2024-09-16 NOTE — TELEPHONE ENCOUNTER
Pt stopped in and is having her last evenity shot on sept 27th. Pt wants to know when she should come in for follow up. She is scheduled for 11/18/24. Please advise

## 2024-09-25 DIAGNOSIS — A60.04 HERPES SIMPLEX VULVOVAGINITIS: ICD-10-CM

## 2024-09-25 DIAGNOSIS — M15.9 GENERALIZED OSTEOARTHRITIS: ICD-10-CM

## 2024-09-25 NOTE — TELEPHONE ENCOUNTER
Received request via: Pharmacy    Was the patient seen in the last year in this department? Yes    Does the patient have an active prescription (recently filled or refills available) for medication(s) requested? No    [facility]    Does the patient have shelter Plus and need 100-day supply? (This applies to ALL medications) Patient does not have SCP

## 2024-09-27 ENCOUNTER — OUTPATIENT INFUSION SERVICES (OUTPATIENT)
Dept: ONCOLOGY | Facility: MEDICAL CENTER | Age: 79
End: 2024-09-27
Attending: FAMILY MEDICINE
Payer: MEDICARE

## 2024-09-27 VITALS
BODY MASS INDEX: 22.19 KG/M2 | DIASTOLIC BLOOD PRESSURE: 69 MMHG | TEMPERATURE: 96.8 F | RESPIRATION RATE: 17 BRPM | OXYGEN SATURATION: 99 % | WEIGHT: 120.59 LBS | SYSTOLIC BLOOD PRESSURE: 130 MMHG | HEART RATE: 80 BPM | HEIGHT: 62 IN

## 2024-09-27 DIAGNOSIS — M81.0 AGE-RELATED OSTEOPOROSIS WITHOUT CURRENT PATHOLOGICAL FRACTURE: ICD-10-CM

## 2024-09-27 LAB
CA-I BLD ISE-SCNC: 1.2 MMOL/L (ref 1.1–1.3)
CREAT BLD-MCNC: 0.9 MG/DL (ref 0.5–1.4)

## 2024-09-27 PROCEDURE — 36415 COLL VENOUS BLD VENIPUNCTURE: CPT

## 2024-09-27 PROCEDURE — 700111 HCHG RX REV CODE 636 W/ 250 OVERRIDE (IP): Mod: JZ,JG | Performed by: FAMILY MEDICINE

## 2024-09-27 PROCEDURE — 96372 THER/PROPH/DIAG INJ SC/IM: CPT

## 2024-09-27 PROCEDURE — 82565 ASSAY OF CREATININE: CPT

## 2024-09-27 PROCEDURE — 82330 ASSAY OF CALCIUM: CPT

## 2024-09-27 RX ORDER — DIPHENHYDRAMINE HYDROCHLORIDE 50 MG/ML
50 INJECTION INTRAMUSCULAR; INTRAVENOUS PRN
OUTPATIENT
Start: 2024-09-27

## 2024-09-27 RX ORDER — METHYLPREDNISOLONE SODIUM SUCCINATE 125 MG/2ML
125 INJECTION, POWDER, LYOPHILIZED, FOR SOLUTION INTRAMUSCULAR; INTRAVENOUS PRN
OUTPATIENT
Start: 2024-09-27

## 2024-09-27 RX ORDER — EPINEPHRINE 1 MG/ML(1)
0.5 AMPUL (ML) INJECTION PRN
OUTPATIENT
Start: 2024-09-27

## 2024-09-27 RX ADMIN — ROMOSOZUMAB-AQQG 105 MG: 105 INJECTION, SOLUTION SUBCUTANEOUS at 11:23

## 2024-09-27 ASSESSMENT — FIBROSIS 4 INDEX: FIB4 SCORE: 1.578947368421052632

## 2024-09-27 NOTE — PROGRESS NOTES
Hermelinda arrives ambulatory to [facility] for the Evenity injection. Labs drawn by venupuncture to left AC. Site covered with gauze and coban. Results reviewed and Evenity was injected in her lower abdomen. Per pt, this will be her last dose until she follows up with her MD. Pt left on foot in [facility].

## 2024-09-28 RX ORDER — VALACYCLOVIR HYDROCHLORIDE 1 G/1
TABLET, FILM COATED ORAL
Qty: 45 TABLET | Refills: 1 | Status: SHIPPED | OUTPATIENT
Start: 2024-09-28

## 2024-10-15 ENCOUNTER — APPOINTMENT (RX ONLY)
Dept: URBAN - METROPOLITAN AREA CLINIC 20 | Facility: CLINIC | Age: 79
Setting detail: DERMATOLOGY
End: 2024-10-15

## 2024-10-15 DIAGNOSIS — Z71.89 OTHER SPECIFIED COUNSELING: ICD-10-CM

## 2024-10-15 DIAGNOSIS — L82.1 OTHER SEBORRHEIC KERATOSIS: ICD-10-CM

## 2024-10-15 DIAGNOSIS — D18.0 HEMANGIOMA: ICD-10-CM

## 2024-10-15 DIAGNOSIS — D22 MELANOCYTIC NEVI: ICD-10-CM

## 2024-10-15 DIAGNOSIS — L81.4 OTHER MELANIN HYPERPIGMENTATION: ICD-10-CM | Status: STABLE

## 2024-10-15 PROBLEM — D18.01 HEMANGIOMA OF SKIN AND SUBCUTANEOUS TISSUE: Status: ACTIVE | Noted: 2024-10-15

## 2024-10-15 PROBLEM — D22.39 MELANOCYTIC NEVI OF OTHER PARTS OF FACE: Status: ACTIVE | Noted: 2024-10-15

## 2024-10-15 PROBLEM — D22.61 MELANOCYTIC NEVI OF RIGHT UPPER LIMB, INCLUDING SHOULDER: Status: ACTIVE | Noted: 2024-10-15

## 2024-10-15 PROBLEM — D22.5 MELANOCYTIC NEVI OF TRUNK: Status: ACTIVE | Noted: 2024-10-15

## 2024-10-15 PROBLEM — D22.71 MELANOCYTIC NEVI OF RIGHT LOWER LIMB, INCLUDING HIP: Status: ACTIVE | Noted: 2024-10-15

## 2024-10-15 PROBLEM — D22.72 MELANOCYTIC NEVI OF LEFT LOWER LIMB, INCLUDING HIP: Status: ACTIVE | Noted: 2024-10-15

## 2024-10-15 PROBLEM — D22.62 MELANOCYTIC NEVI OF LEFT UPPER LIMB, INCLUDING SHOULDER: Status: ACTIVE | Noted: 2024-10-15

## 2024-10-15 PROCEDURE — ? COUNSELING

## 2024-10-15 PROCEDURE — 99213 OFFICE O/P EST LOW 20 MIN: CPT

## 2024-10-15 PROCEDURE — ? SUNSCREEN RECOMMENDATIONS

## 2024-10-15 PROCEDURE — ? OBSERVATION AND MEASURE

## 2024-10-15 ASSESSMENT — LOCATION SIMPLE DESCRIPTION DERM
LOCATION SIMPLE: RIGHT CHEEK
LOCATION SIMPLE: RIGHT FOREHEAD
LOCATION SIMPLE: LEFT POSTERIOR THIGH
LOCATION SIMPLE: RIGHT THIGH
LOCATION SIMPLE: UPPER BACK
LOCATION SIMPLE: RIGHT FOREARM
LOCATION SIMPLE: RIGHT UPPER BACK
LOCATION SIMPLE: LEFT FOREARM
LOCATION SIMPLE: RIGHT LOWER BACK
LOCATION SIMPLE: RIGHT POSTERIOR UPPER ARM
LOCATION SIMPLE: LEFT POSTERIOR UPPER ARM
LOCATION SIMPLE: RIGHT PRETIBIAL REGION
LOCATION SIMPLE: LEFT PRETIBIAL REGION
LOCATION SIMPLE: RIGHT POSTERIOR THIGH

## 2024-10-15 ASSESSMENT — LOCATION ZONE DERM
LOCATION ZONE: LEG
LOCATION ZONE: TRUNK
LOCATION ZONE: FACE
LOCATION ZONE: ARM

## 2024-10-15 ASSESSMENT — LOCATION DETAILED DESCRIPTION DERM
LOCATION DETAILED: RIGHT INFERIOR CENTRAL MALAR CHEEK
LOCATION DETAILED: RIGHT INFERIOR MEDIAL UPPER BACK
LOCATION DETAILED: RIGHT INFERIOR MEDIAL MIDBACK
LOCATION DETAILED: INFERIOR THORACIC SPINE
LOCATION DETAILED: RIGHT VENTRAL PROXIMAL FOREARM
LOCATION DETAILED: RIGHT DISTAL POSTERIOR UPPER ARM
LOCATION DETAILED: LEFT DISTAL POSTERIOR THIGH
LOCATION DETAILED: RIGHT PROXIMAL PRETIBIAL REGION
LOCATION DETAILED: RIGHT DISTAL POSTERIOR THIGH
LOCATION DETAILED: RIGHT SUPERIOR UPPER BACK
LOCATION DETAILED: RIGHT ANTERIOR DISTAL THIGH
LOCATION DETAILED: RIGHT INFERIOR FOREHEAD
LOCATION DETAILED: LEFT PROXIMAL POSTERIOR UPPER ARM
LOCATION DETAILED: LEFT LATERAL PROXIMAL PRETIBIAL REGION
LOCATION DETAILED: LEFT VENTRAL PROXIMAL FOREARM

## 2024-10-29 DIAGNOSIS — M15.9 GENERALIZED OSTEOARTHRITIS: ICD-10-CM

## 2024-11-04 ENCOUNTER — HOSPITAL ENCOUNTER (OUTPATIENT)
Dept: LAB | Facility: MEDICAL CENTER | Age: 79
End: 2024-11-04
Attending: INTERNAL MEDICINE
Payer: MEDICARE

## 2024-11-04 DIAGNOSIS — E55.9 VITAMIN D DEFICIENCY: ICD-10-CM

## 2024-11-04 DIAGNOSIS — M15.9 GENERALIZED OSTEOARTHRITIS: ICD-10-CM

## 2024-11-04 DIAGNOSIS — R93.89 ABNORMAL FINDINGS ON DIAGNOSTIC IMAGING OF OTHER SPECIFIED BODY STRUCTURES: ICD-10-CM

## 2024-11-04 DIAGNOSIS — M81.0 AGE-RELATED OSTEOPOROSIS WITHOUT CURRENT PATHOLOGICAL FRACTURE: ICD-10-CM

## 2024-11-04 LAB
25(OH)D3 SERPL-MCNC: 70 NG/ML (ref 30–100)
ALBUMIN SERPL BCP-MCNC: 3.9 G/DL (ref 3.2–4.9)
ALBUMIN/GLOB SERPL: 1.3 G/DL
ALP SERPL-CCNC: 75 U/L (ref 30–99)
ALT SERPL-CCNC: 9 U/L (ref 2–50)
ANION GAP SERPL CALC-SCNC: 11 MMOL/L (ref 7–16)
AST SERPL-CCNC: 16 U/L (ref 12–45)
BILIRUB SERPL-MCNC: 0.4 MG/DL (ref 0.1–1.5)
BUN SERPL-MCNC: 17 MG/DL (ref 8–22)
CALCIUM ALBUM COR SERPL-MCNC: 8.9 MG/DL (ref 8.5–10.5)
CALCIUM SERPL-MCNC: 8.8 MG/DL (ref 8.5–10.5)
CHLORIDE SERPL-SCNC: 106 MMOL/L (ref 96–112)
CO2 SERPL-SCNC: 23 MMOL/L (ref 20–33)
CREAT SERPL-MCNC: 0.76 MG/DL (ref 0.5–1.4)
GFR SERPLBLD CREATININE-BSD FMLA CKD-EPI: 79 ML/MIN/1.73 M 2
GLOBULIN SER CALC-MCNC: 3 G/DL (ref 1.9–3.5)
GLUCOSE SERPL-MCNC: 84 MG/DL (ref 65–99)
PHOSPHATE SERPL-MCNC: 3.1 MG/DL (ref 2.5–4.5)
POTASSIUM SERPL-SCNC: 4.1 MMOL/L (ref 3.6–5.5)
PROT SERPL-MCNC: 6.9 G/DL (ref 6–8.2)
PTH-INTACT SERPL-MCNC: 39.4 PG/ML (ref 14–72)
SODIUM SERPL-SCNC: 140 MMOL/L (ref 135–145)

## 2024-11-04 PROCEDURE — 83519 RIA NONANTIBODY: CPT

## 2024-11-04 PROCEDURE — 82306 VITAMIN D 25 HYDROXY: CPT

## 2024-11-04 PROCEDURE — 83970 ASSAY OF PARATHORMONE: CPT

## 2024-11-04 PROCEDURE — 84100 ASSAY OF PHOSPHORUS: CPT

## 2024-11-04 PROCEDURE — 36415 COLL VENOUS BLD VENIPUNCTURE: CPT

## 2024-11-04 PROCEDURE — 82523 COLLAGEN CROSSLINKS: CPT

## 2024-11-04 PROCEDURE — 80053 COMPREHEN METABOLIC PANEL: CPT

## 2024-11-06 LAB — COLLAGEN CTX SERPL-MCNC: 255 PG/ML

## 2024-11-07 LAB — TEST NAME 95000: NORMAL

## 2024-11-18 ENCOUNTER — OFFICE VISIT (OUTPATIENT)
Dept: ENDOCRINOLOGY | Facility: MEDICAL CENTER | Age: 79
End: 2024-11-18
Attending: INTERNAL MEDICINE
Payer: MEDICARE

## 2024-11-18 VITALS
HEIGHT: 61 IN | SYSTOLIC BLOOD PRESSURE: 108 MMHG | DIASTOLIC BLOOD PRESSURE: 56 MMHG | WEIGHT: 119 LBS | OXYGEN SATURATION: 99 % | BODY MASS INDEX: 22.47 KG/M2 | HEART RATE: 96 BPM

## 2024-11-18 DIAGNOSIS — M81.0 AGE-RELATED OSTEOPOROSIS WITHOUT CURRENT PATHOLOGICAL FRACTURE: ICD-10-CM

## 2024-11-18 DIAGNOSIS — E55.9 VITAMIN D DEFICIENCY: ICD-10-CM

## 2024-11-18 DIAGNOSIS — I10 PRIMARY HYPERTENSION: ICD-10-CM

## 2024-11-18 PROCEDURE — 99211 OFF/OP EST MAY X REQ PHY/QHP: CPT | Performed by: INTERNAL MEDICINE

## 2024-11-18 PROCEDURE — 3074F SYST BP LT 130 MM HG: CPT | Performed by: INTERNAL MEDICINE

## 2024-11-18 PROCEDURE — 99214 OFFICE O/P EST MOD 30 MIN: CPT | Performed by: INTERNAL MEDICINE

## 2024-11-18 PROCEDURE — 3078F DIAST BP <80 MM HG: CPT | Performed by: INTERNAL MEDICINE

## 2024-11-18 RX ORDER — DIPHENHYDRAMINE HYDROCHLORIDE 50 MG/ML
50 INJECTION INTRAMUSCULAR; INTRAVENOUS PRN
OUTPATIENT
Start: 2024-11-18

## 2024-11-18 RX ORDER — 0.9 % SODIUM CHLORIDE 0.9 %
10 VIAL (ML) INJECTION PRN
OUTPATIENT
Start: 2024-11-18

## 2024-11-18 RX ORDER — ZOLEDRONIC ACID 0.05 MG/ML
5 INJECTION, SOLUTION INTRAVENOUS ONCE
OUTPATIENT
Start: 2024-11-18 | End: 2024-11-18

## 2024-11-18 RX ORDER — EPINEPHRINE 1 MG/ML(1)
0.5 AMPUL (ML) INJECTION PRN
OUTPATIENT
Start: 2024-11-18

## 2024-11-18 RX ORDER — SODIUM CHLORIDE 9 MG/ML
INJECTION, SOLUTION INTRAVENOUS CONTINUOUS
OUTPATIENT
Start: 2024-11-18

## 2024-11-18 RX ORDER — METHYLPREDNISOLONE SODIUM SUCCINATE 125 MG/2ML
125 INJECTION, POWDER, LYOPHILIZED, FOR SOLUTION INTRAMUSCULAR; INTRAVENOUS PRN
OUTPATIENT
Start: 2024-11-18

## 2024-11-18 RX ORDER — 0.9 % SODIUM CHLORIDE 0.9 %
3 VIAL (ML) INJECTION PRN
OUTPATIENT
Start: 2024-11-18

## 2024-11-18 RX ORDER — 0.9 % SODIUM CHLORIDE 0.9 %
VIAL (ML) INJECTION PRN
OUTPATIENT
Start: 2024-11-18

## 2024-11-18 ASSESSMENT — FIBROSIS 4 INDEX: FIB4 SCORE: 1.403508771929824561

## 2024-11-18 NOTE — PROGRESS NOTES
Chief Complaint: Follow up for Osteoporosis/Osteopenia    HPI:     Hermelinda Roger is a 79 y.o. female here for follow up of the above medical issue.    She was diagnosed with osteoporosis by bone density scan in 7/3/2023 with the lowest T score of -2.6.  Prior to this she was had osteopenia diagnosed in 2009 and she was taking Fosamax but she still had bone loss. She used to live in [facility] [facility] [place] in [place].  She is a runner.  She was a dental hygienist.      Patient admits to history of fracture. (Traumatic fracture of finger in [address], Broke L ankle in 2001 and broke right ankle in 2014 from sports related injuries)   She had a total hysterectomy without oophorectomy in 1985.      She was on Evenity which was started on 10/2023 by Dr. Susana Miranda but  stopping giving Evenity in the office thus she switched to getting Evenity at [facility].       Since last visit patient reports feeling excellent.  She denies interval fracture.  She denies interval fall.  She denies interval nephrolithiasis.  She reports adherence to calcium supplementation recommendations.  She takes Calcium carbonate  650mg bid.   She reports adherence to vitamin D supplementation.  She takes vitamin D3 3000IU daily.   Her activity level: moderate regular exercise, aerobic < 3 days a week      Current osteoporosis pharmacologic treatment: completed 12 months of Evenity.        Today we talked about consolidation therapy with either IV Reclast, oral bisphosphonates or Prolia    We discussed the benefits and disadvantages of each approach.  She prefers IV Reclast      She reports excellent medication adherence and denies missing any monthly doses.          Latest Reference Range & Units 11/04/24 06:22   25-Hydroxy   Vitamin D 25 30 - 100 ng/mL 70   C. Telopeptide B Cross Linked pg/mL 255      Latest Reference Range & Units 11/04/24 06:22   Pth, Intact 14.0 - 72.0 pg/mL 39.4       Last Bone Density  7/3/2023    Patient's medications, allergies, and social histories were reviewed and updated as appropriate.      ROS:       CONS:     No fever, no chills   EYES:     No diplopia, no blurry vision   CV:           No chest pain, no palpitations   PULM:     No SOB, no cough, no hemoptysis.   GI:            No nausea, no vomiting, no diarrhea, no constipation   ENDO:     No polyuria, no polydipsia, no heat intolerance, no cold intolerance     Past Medical History:  Problem List:  2024-01: Agatston CAC score, <100  2023-07: Age-related osteoporosis without current pathological   fracture  2023-07: Primary hypertension  2022-07: Presbycusis of both ears  2022-07: Venous insufficiency  2022-07: History of fracture of right ankle s/p ORIF in 2015 2021-12: Primary osteoarthritis of both hips, CAM impignement, GLORY s/  p PT and steroid injection  2021-07: Trigeminal neuralgia_R  2018-04: Chest discomfort  2018-04: Palpitations  2017-06: Herpes simplex vulvovaginitis  2015-01: Ankle fracture  2014-11: Neutropenia, drug-induced (HCC)  2014-03: Vitamin D insufficiency  2012-12: Lumbar radicular pain  2011-08: Generalized osteoarthritis  2011-08: Osteoarthritis of hip  2011-04: Iron deficiency anemia  2010-10: Atrophic vaginitis  2010-09: LBBB (left bundle branch block)  2010-08: Heart palpitations  2010-08: Preventative health care  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  Pure hypercholesterolemia  Gastroesophageal reflux disease without esophagitis  Raynaud's phenomenon  Seasonal allergies  Post zoster neuralgia  Leg length discrepancy  Osteopenia of multiple sites      Past Surgical History:  Past Surgical History:   Procedure Laterality Date    ORIF, ANKLE  1/6/15    [facility]    KNEE ARTHROSCOPY  12/29/2011    Performed by BRAFDORD WEBB at SURGERY [facility] ORS    MENISCECTOMY, KNEE, MEDIAL  12/29/2011    Performed by BRADFORD WEBB at [facility] ORS    HYSTERECTOMY, VAGINAL  1985    APPENDECTOMY  1980    was  "in [place]    LAPAROTOMY  1980    excision uterine fibroid    HARDWARE REMOVAL ORTHO  1979    left ring finger    HAND SURGERY  1978    ORIF left ring finger    CATARACT EXTRACTION WITH IOL  2007, 2008    Bilateral    TONSILLECTOMY AND ADENOIDECTOMY  as child        Allergies:  Carbamazepine, Nabumetone, Tape, and Tramadol     Social History:  Social History     Tobacco Use    Smoking status: Never    Smokeless tobacco: Never   Vaping Use    Vaping status: Never Used   Substance Use Topics    Alcohol use: Yes     Alcohol/week: 2.4 oz     Types: 4 Glasses of wine per week     Comment: 2-4 per week    Drug use: No        Family History:   family history includes Cancer in her paternal grandfather; Diabetes in her brother and maternal grandfather; Genitourinary () Problems in her mother; Heart Disease in her father and maternal grandfather; Heart Disease (age of onset: 42) in her brother; No Known Problems in her maternal grandmother and paternal grandmother.      PHYSICAL EXAM:   Vital signs: /56 (BP Location: Left arm, Patient Position: Sitting, BP Cuff Size: Adult)   Pulse 96   Ht 1.549 m (5' 1\")   Wt 54 kg (119 lb)   LMP 03/01/1986   SpO2 99%   BMI 22.48 kg/m²   GENERAL: Well-developed, well-nourished in no apparent distress.   EYE:  No ocular asymmetry, PERRLA  HENT: Pink, moist mucous membranes.    NECK: No thyromegaly.   CARDIOVASCULAR:  No murmurs  LUNGS: Clear breath sounds  ABDOMEN: Soft, nontender   EXTREMITIES: No clubbing, cyanosis, or edema.   NEUROLOGICAL: No gross focal motor abnormalities   LYMPH: No cervical adenopathy palpated.   SKIN: No rashes, lesions.       Labs:    Lab Results   Component Value Date/Time    WBC 6.3 07/26/2024 06:19 AM    WBC 4.1 05/06/2013 07:59 AM    RBC 4.19 (L) 07/26/2024 06:19 AM    RBC 3.83 05/06/2013 07:59 AM    HEMOGLOBIN 14.1 07/26/2024 06:19 AM    .7 (H) 07/26/2024 06:19 AM    MCV 99 (H) 05/06/2013 07:59 AM    MCH 33.7 (H) 07/26/2024 06:19 AM    MCH " "31.9 2013 07:59 AM    MCHC 33.1 2024 06:19 AM    RDW 47.8 2024 06:19 AM    RDW 13.8 2013 07:59 AM    MPV 10.0 2024 06:19 AM       Lab Results   Component Value Date/Time    SODIUM 140 2024 06:22 AM    POTASSIUM 4.1 2024 06:22 AM    CHLORIDE 106 2024 06:22 AM    CO2 23 2024 06:22 AM    ANION 11.0 2024 06:22 AM    GLUCOSE 84 2024 06:22 AM    BUN 17 2024 06:22 AM    CREATININE 0.76 2024 06:22 AM    CREATININE 0.82 2013 07:59 AM    CALCIUM 8.8 2024 06:22 AM    ASTSGOT 16 2024 06:22 AM    ALTSGPT 9 2024 06:22 AM    TBILIRUBIN 0.4 2024 06:22 AM    ALBUMIN 3.9 2024 06:22 AM    ALBUMIN 4.17 2024 06:18 AM    TOTPROTEIN 6.9 2024 06:22 AM    TOTPROTEIN 7.2 2024 06:18 AM    GLOBULIN 3.0 2024 06:22 AM    AGRATIO 1.3 2024 06:22 AM       Lab Results   Component Value Date/Time    TSHULTRASEN 3.330 2024 0618     Lab Results   Component Value Date/Time    FREET4 1.05 2024 0618     No results found for: \"FREET3\"  No results found for: \"THYSTIMIG\"        Imagin/3/2023 7:59 AM     HISTORY/REASON FOR EXAM:  Postmenopausal, hysterectomy, osteopenia of the lumbar spine and left femur, history of fracture     TECHNIQUE/EXAM DESCRIPTION AND NUMBER OF VIEWS:  Dual x-ray bone densitometry was performed from the L1 through L4 levels and from the proximal left femur utilizing the GE Prodigy unit.     COMPARISON: Bone densitometry scan 2020     FINDINGS:  The lumbar spine has a mean bone mineral density of 0.864 g/cm2, with a T score of -2.6 and a Z score of -0.5.     The proximal left femur has a mean bone mineral density of 0.733 g/cm2, with a T score of -2.2 and a Z score of 0.0.     When compared with the most recent study dated 2020, there has been a 2.9% decrease in the bone mineral density of the lumbar spine and a 2.1% decrease in the bone mineral density of the left " femur.     IMPRESSION:     According to the World Health Organization classification, bone mineral density of this patient is osteoporotic in the lumbar spine, osteopenic in the left femur with no significant change.     10-year Probability of Fracture:  Major Osteoporotic     21.1%  Hip     6.1%  Population      [place] ()     Based on left femur neck BMD     INTERPRETING LOCATION: [address] [place], [place] [address], [id number]      ASSESSMENT/PLAN:     1. Age-related osteoporosis without current pathological fracture  Stable  She has completed 12 months of Evenity will order IV Reclast for consolidation therapy reviewed side effects of Reclast  She will contact infusion center  Discussed the importance of regular weightbearing exercise  Discussed the importance of good dental hygiene and regular dental visits  Discussed the importance of adequate calcium and vitamin supplementation  Reviewed fall precautions  She should notify me if she has any falls or fractures  I am ordering a bone density this years because she completed 12 months of Evenity    2. Vitamin D deficiency  Stable   Vitamin D labs were reviewed with patient  Continue current supplements  Continue monitoring levels       3. Primary hypertension  Controlled continue current medications      Return in about 6 months (around 5/18/2025).      This patient during there office visit today was started on a new medication.  Side effects of the new medication were discussed with the patient today in the office.     Thank you kindly for allowing me to participate in the endocrine care plan for this patient.    Victor M Woods MD, [facility], [facility]  11/18/24    CC:   Johana Felix M.D.

## 2024-12-16 ENCOUNTER — OFFICE VISIT (OUTPATIENT)
Dept: URGENT CARE | Facility: CLINIC | Age: 79
End: 2024-12-16
Payer: MEDICARE

## 2024-12-16 ENCOUNTER — HOSPITAL ENCOUNTER (OUTPATIENT)
Dept: RADIOLOGY | Facility: MEDICAL CENTER | Age: 79
End: 2024-12-16
Attending: PHYSICIAN ASSISTANT
Payer: MEDICARE

## 2024-12-16 VITALS
BODY MASS INDEX: 22.84 KG/M2 | TEMPERATURE: 97.8 F | DIASTOLIC BLOOD PRESSURE: 68 MMHG | OXYGEN SATURATION: 98 % | HEART RATE: 85 BPM | SYSTOLIC BLOOD PRESSURE: 126 MMHG | WEIGHT: 121 LBS | HEIGHT: 61 IN | RESPIRATION RATE: 14 BRPM

## 2024-12-16 DIAGNOSIS — S09.90XA CLOSED HEAD INJURY WITHOUT LOSS OF CONSCIOUSNESS, INITIAL ENCOUNTER: ICD-10-CM

## 2024-12-16 PROCEDURE — 70450 CT HEAD/BRAIN W/O DYE: CPT

## 2024-12-16 PROCEDURE — 3078F DIAST BP <80 MM HG: CPT | Performed by: PHYSICIAN ASSISTANT

## 2024-12-16 PROCEDURE — 99214 OFFICE O/P EST MOD 30 MIN: CPT | Performed by: PHYSICIAN ASSISTANT

## 2024-12-16 PROCEDURE — 3074F SYST BP LT 130 MM HG: CPT | Performed by: PHYSICIAN ASSISTANT

## 2024-12-16 ASSESSMENT — FIBROSIS 4 INDEX: FIB4 SCORE: 1.403508771929824561

## 2024-12-16 ASSESSMENT — ENCOUNTER SYMPTOMS
CHILLS: 0
LOSS OF CONSCIOUSNESS: 0
DOUBLE VISION: 0
FEVER: 0
DIZZINESS: 0
BLURRED VISION: 0
HEADACHES: 1

## 2024-12-16 NOTE — PROGRESS NOTES
Subjective:   Hermelinda Roger is a 79 y.o. female who presents today with   Chief Complaint   Patient presents with    Fall     Last Monday, fell in kitchen hit head did not go to ER has lump on back of head     Fall  The accident occurred 5 to 7 days ago. The fall occurred while walking. She fell from a height of 3 to 5 ft. She landed on Hard floor. There was no blood loss. The point of impact was the head. The pain is present in the head. Associated symptoms include headaches. Pertinent negatives include no fever or loss of consciousness.     Patient presents today with her .  She states she was getting up from her table and had some new slippers on and lost her footing causing her to fall backwards and hit the back right side of her head.  Does have history of trigeminal neuralgia.  Has not had any dizziness, nausea or vomiting but has had some low-grade headache persistent.  Has been able to go to her normal swimming and activities and has been driving with no issues.    PMH:  has a past medical history of Ankle fracture (01/18/2015), Atrophic vaginitis, CAD (coronary artery disease), CATARACT, Chest pain (04/2018), DJD (degenerative joint disease) of hip (08/22/2011), GENETIC SUSCEPTIBILITY TO DISEASE, GERD (gastroesophageal reflux disease), HERPES ZOSTER, Hyperlipidemia LDL goal < 70, Hypertension, Iron deficiency anemia (04/14/2011), LBBB (left bundle branch block) (09/25/2010), Leg length discrepancy, Lumbar radicular pain (12/15/2012), Osteoarthritis (08/22/2011), Osteopenia, Palpitations, Raynaud's phenomenon, Seasonal allergies, Tinnitus, and Varicose vein of leg.  MEDS:   Current Outpatient Medications:     diclofenac sodium (VOLTAREN) 1 % Gel, APPLY TO 2-4 GRAM TO AFFECTED AREA 4 TIMES DAILY, Disp: 100 g, Rfl: 0    valacyclovir (VALTREX) 1 GM Tab, TAKE 0.5 TABLET ONCE DAILY, Disp: 45 Tablet, Rfl: 1    amLODIPine (NORVASC) 5 MG Tab, Take 1 Tablet by mouth every day., Disp: 90 Tablet, Rfl:  3    atorvastatin (LIPITOR) 20 MG Tab, Take 1 Tablet by mouth every day., Disp: 90 Tablet, Rfl: 3    famotidine (PEPCID) 20 MG Tab, Take 1 Tablet by mouth every day., Disp: 90 Tablet, Rfl: 3    fluticasone (FLONASE) 50 MCG/ACT nasal spray, Administer 1 Spray into affected nostril(S) every day., Disp: 48 g, Rfl: 3    fexofenadine (ALLEGRA) 180 MG tablet, Take 1 Tablet by mouth every day. (Patient taking differently: Take 180 mg by mouth every day. 1/2 tab a day), Disp: 90 Tablet, Rfl: 3    Magnesium 400 MG Cap, Take  by mouth., Disp: , Rfl:     Cholecalciferol (VITAMIN D) 2000 UNIT Tab, Take  by mouth every day., Disp: , Rfl:     CALCIUM 500 + D PO, Take  by mouth. 2 daily, Disp: , Rfl:   ALLERGIES:   Allergies   Allergen Reactions    Carbamazepine Unspecified     Cognetive    Nabumetone      Neutropenia    Tape      adheavise    Tramadol Unspecified     Hard to move after taking     SURGHX:   Past Surgical History:   Procedure Laterality Date    ORIF, ANKLE  1/6/15    [facility]    KNEE ARTHROSCOPY  12/29/2011    Performed by BRADFORD WEBB at SURGERY [facility] ORS    MENISCECTOMY, KNEE, MEDIAL  12/29/2011    Performed by BRADFORD WEBB at [facility] ORS    HYSTERECTOMY, VAGINAL  1985    APPENDECTOMY  1980    was in [place]    LAPAROTOMY  1980    excision uterine fibroid    HARDWARE REMOVAL ORTHO  1979    left ring finger    HAND SURGERY  1978    ORIF left ring finger    CATARACT EXTRACTION WITH IOL  2007, 2008    Bilateral    TONSILLECTOMY AND ADENOIDECTOMY  as child     SOCHX:  reports that she has never smoked. She has never used smokeless tobacco. She reports current alcohol use of about 2.4 oz of alcohol per week. She reports that she does not use drugs.  FH: Reviewed with patient, not pertinent to this visit.     Review of Systems   Constitutional:  Negative for chills and fever.   Eyes:  Negative for blurred vision and double vision.   Neurological:  Positive for headaches. Negative for dizziness  "and loss of consciousness.      Objective:   /68 (BP Location: Left arm, Patient Position: Sitting, BP Cuff Size: Small adult)   Pulse 85   Temp 36.6 °C (97.8 °F) (Temporal)   Resp 14   Ht 1.549 m (5' 1\")   Wt 54.9 kg (121 lb)   LMP 03/01/1986   SpO2 98%   BMI 22.86 kg/m²   Physical Exam  Vitals and nursing note reviewed.   Constitutional:       General: She is not in acute distress.     Appearance: Normal appearance. She is well-developed. She is not ill-appearing or toxic-appearing.   HENT:      Head: Normocephalic and atraumatic.        Comments: Bump to the back of the head with no laceration.  Couple of small areas of bruising but no notable rash today.  No open wound.     Right Ear: Hearing normal.      Left Ear: Hearing normal.   Eyes:      General: No visual field deficit.  Cardiovascular:      Rate and Rhythm: Normal rate.   Pulmonary:      Effort: Pulmonary effort is normal.   Musculoskeletal:      Comments: Normal movement in all 4 extremities   Skin:     General: Skin is warm and dry.   Neurological:      Mental Status: She is alert and oriented to person, place, and time.      GCS: GCS eye subscore is 4. GCS verbal subscore is 5. GCS motor subscore is 6.      Cranial Nerves: Cranial nerves 2-12 are intact. No cranial nerve deficit, dysarthria or facial asymmetry.      Sensory: Sensation is intact.      Motor: Motor function is intact.      Coordination: Coordination is intact. Coordination normal.      Gait: Gait is intact.   Psychiatric:         Mood and Affect: Mood normal.       CT HEAD    FINDINGS:  No hemorrhage is seen.     There is cerebral volume loss.     The ventricular system is normal in size and position for the degree of cerebral volume loss.     Gray white junction differentiation is preserved.     There are nonspecific white matter hypodensities.     No noncontrast evidence of mass.     Visualized paranasal sinuses are clear.     IMPRESSION:     No noncontrast CT evidence " of acute intracranial hemorrhage.     Mild white matter disease.    Assessment/Plan:   Assessment    1. Closed head injury without loss of consciousness, initial encounter  - CT-HEAD W/O; Future    Likely mild concussion.  Given that she is still having persistent low-grade headache would recommend CT scan according to Harrod CT scoring.  Discussed possibility of shingles although rash does not appear consistent with that today.  No laceration to the head.  Recommend follow-up with primary care and potential need of physical therapy for balance as she states she is felt off balance since the head injury.  No acute neuro deficit appreciated on exam today.    Differential diagnosis, natural history, supportive care, and indications for immediate follow-up discussed.   Patient given instructions and understanding of medications and treatment.    If not improving in 3-5 days, F/U with PCP or return to  if symptoms worsen.    Patient agreeable to plan.      Please note that this dictation was created using voice recognition software. I have made every reasonable attempt to correct obvious errors, but I expect that there are errors of grammar and possibly content that I did not discover before finalizing the note.    Calvin Lindsey PA-C

## 2024-12-17 ENCOUNTER — PATIENT MESSAGE (OUTPATIENT)
Dept: MEDICAL GROUP | Facility: MEDICAL CENTER | Age: 79
End: 2024-12-17
Payer: MEDICARE

## 2024-12-17 DIAGNOSIS — R26.89 IMBALANCE: ICD-10-CM

## 2024-12-17 DIAGNOSIS — Z91.81 AT MODERATE RISK FOR FALL: ICD-10-CM

## 2024-12-20 ENCOUNTER — HOSPITAL ENCOUNTER (OUTPATIENT)
Dept: RADIOLOGY | Facility: MEDICAL CENTER | Age: 79
End: 2024-12-20
Attending: INTERNAL MEDICINE
Payer: MEDICARE

## 2024-12-20 DIAGNOSIS — M81.0 AGE-RELATED OSTEOPOROSIS WITHOUT CURRENT PATHOLOGICAL FRACTURE: ICD-10-CM

## 2024-12-20 PROCEDURE — 77080 DXA BONE DENSITY AXIAL: CPT

## 2024-12-27 ENCOUNTER — OUTPATIENT INFUSION SERVICES (OUTPATIENT)
Dept: ONCOLOGY | Facility: MEDICAL CENTER | Age: 79
End: 2024-12-27
Attending: INTERNAL MEDICINE
Payer: MEDICARE

## 2024-12-27 VITALS
BODY MASS INDEX: 22.23 KG/M2 | DIASTOLIC BLOOD PRESSURE: 65 MMHG | TEMPERATURE: 96.5 F | OXYGEN SATURATION: 98 % | HEIGHT: 62 IN | RESPIRATION RATE: 18 BRPM | HEART RATE: 90 BPM | SYSTOLIC BLOOD PRESSURE: 119 MMHG | WEIGHT: 120.81 LBS

## 2024-12-27 DIAGNOSIS — M81.0 AGE-RELATED OSTEOPOROSIS WITHOUT CURRENT PATHOLOGICAL FRACTURE: ICD-10-CM

## 2024-12-27 LAB
CA-I BLD ISE-SCNC: 1.18 MMOL/L (ref 1.1–1.3)
CREAT BLD-MCNC: 1 MG/DL (ref 0.5–1.4)

## 2024-12-27 PROCEDURE — 700111 HCHG RX REV CODE 636 W/ 250 OVERRIDE (IP): Mod: JZ | Performed by: INTERNAL MEDICINE

## 2024-12-27 PROCEDURE — 96365 THER/PROPH/DIAG IV INF INIT: CPT

## 2024-12-27 PROCEDURE — 82330 ASSAY OF CALCIUM: CPT

## 2024-12-27 PROCEDURE — 82565 ASSAY OF CREATININE: CPT

## 2024-12-27 RX ORDER — ZOLEDRONIC ACID 0.05 MG/ML
5 INJECTION, SOLUTION INTRAVENOUS ONCE
Status: COMPLETED | OUTPATIENT
Start: 2024-12-27 | End: 2024-12-27

## 2024-12-27 RX ORDER — 0.9 % SODIUM CHLORIDE 0.9 %
VIAL (ML) INJECTION PRN
OUTPATIENT
Start: 2025-12-28

## 2024-12-27 RX ORDER — 0.9 % SODIUM CHLORIDE 0.9 %
10 VIAL (ML) INJECTION PRN
OUTPATIENT
Start: 2025-12-28

## 2024-12-27 RX ORDER — 0.9 % SODIUM CHLORIDE 0.9 %
3 VIAL (ML) INJECTION PRN
OUTPATIENT
Start: 2025-12-28

## 2024-12-27 RX ORDER — EPINEPHRINE 1 MG/ML(1)
0.5 AMPUL (ML) INJECTION PRN
OUTPATIENT
Start: 2025-12-28

## 2024-12-27 RX ORDER — METHYLPREDNISOLONE SODIUM SUCCINATE 125 MG/2ML
125 INJECTION, POWDER, LYOPHILIZED, FOR SOLUTION INTRAMUSCULAR; INTRAVENOUS PRN
OUTPATIENT
Start: 2025-12-28

## 2024-12-27 RX ORDER — ZOLEDRONIC ACID 0.05 MG/ML
5 INJECTION, SOLUTION INTRAVENOUS ONCE
OUTPATIENT
Start: 2025-12-28 | End: 2025-12-28

## 2024-12-27 RX ORDER — SODIUM CHLORIDE 9 MG/ML
INJECTION, SOLUTION INTRAVENOUS CONTINUOUS
OUTPATIENT
Start: 2025-12-28

## 2024-12-27 RX ORDER — DIPHENHYDRAMINE HYDROCHLORIDE 50 MG/ML
50 INJECTION INTRAMUSCULAR; INTRAVENOUS PRN
OUTPATIENT
Start: 2025-12-28

## 2024-12-27 RX ADMIN — ZOLEDRONIC ACID 5 MG: 5 INJECTION, SOLUTION INTRAVENOUS at 08:07

## 2024-12-27 ASSESSMENT — FIBROSIS 4 INDEX: FIB4 SCORE: 1.403508771929824561

## 2024-12-27 NOTE — PROGRESS NOTES
Patient came into infusion with . Orders and vitals reviewed, assessment done. PIV established with blood return noted. Labs collected off IV start. Labs reviewed, patient meets parameters to proceed with treatment today. Reclast infused as ordered with no adverse events. PIV flushed and removed with tip intact. Patient left in stable condition.

## 2025-01-07 ENCOUNTER — OFFICE VISIT (OUTPATIENT)
Dept: MEDICAL GROUP | Facility: MEDICAL CENTER | Age: 80
End: 2025-01-07
Payer: MEDICARE

## 2025-01-07 VITALS
TEMPERATURE: 97.3 F | HEART RATE: 74 BPM | HEIGHT: 61 IN | SYSTOLIC BLOOD PRESSURE: 110 MMHG | BODY MASS INDEX: 22.77 KG/M2 | DIASTOLIC BLOOD PRESSURE: 68 MMHG | WEIGHT: 120.59 LBS | OXYGEN SATURATION: 97 %

## 2025-01-07 DIAGNOSIS — M85.89 OSTEOPENIA OF MULTIPLE SITES: ICD-10-CM

## 2025-01-07 DIAGNOSIS — S06.0X0A BRAIN CONCUSSION, WITHOUT LOSS OF CONSCIOUSNESS, INITIAL ENCOUNTER: ICD-10-CM

## 2025-01-07 DIAGNOSIS — S00.03XA CONTUSION OF OCCIPITAL REGION OF SCALP, INITIAL ENCOUNTER: ICD-10-CM

## 2025-01-07 DIAGNOSIS — Z91.81 RISK FOR FALLS: ICD-10-CM
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
## 2025-01-07 DIAGNOSIS — R27.8 DYSMETRIA: ICD-10-CM

## 2025-01-07 DIAGNOSIS — R26.9 ABNORMALITY OF GAIT: ICD-10-CM

## 2025-01-07 DIAGNOSIS — I10 PRIMARY HYPERTENSION: ICD-10-CM

## 2025-01-07 PROCEDURE — 3078F DIAST BP <80 MM HG: CPT | Performed by: FAMILY MEDICINE

## 2025-01-07 PROCEDURE — 99215 OFFICE O/P EST HI 40 MIN: CPT | Performed by: FAMILY MEDICINE

## 2025-01-07 PROCEDURE — 3074F SYST BP LT 130 MM HG: CPT | Performed by: FAMILY MEDICINE

## 2025-01-07 ASSESSMENT — FIBROSIS 4 INDEX: FIB4 SCORE: 1.403508771929824561

## 2025-01-07 ASSESSMENT — PATIENT HEALTH QUESTIONNAIRE - PHQ9: CLINICAL INTERPRETATION OF PHQ2 SCORE: 0

## 2025-01-08 PROBLEM — Z91.81 RISK FOR FALLS: Status: ACTIVE | Noted: 2025-01-08

## 2025-01-09 NOTE — PROGRESS NOTES
Verbal consent was acquired by the patient to use [employer] ambient listening note generation during this visit: YES    CC: acute concussion     Assessment & Plan:     1. Osteopenia of multiple sites  Chronic, currently being managed by endocrinology.  Patient was on Evenity for 1 year.  She is now getting Reclast infusion annually for 3 years.  She is taking calcium and vitamin D regularly.  -Follow-up with endocrinology as directed  - Vitamin D: 2000 units per day, maintain serum vitamin D level > 30   - Calcium 600 mg BID  - Weight-bearing, balance, resistance exercises   - maintain healthy active lifestyle  - avoid or stop smoking  - Limit alcohol consumption to one drink per day  - pt was counseled on fall prevention    - home fall-proofing information provided.      2. Primary hypertension  Chronic, controlled with amlodipine 5 mg daily, no s/e reported, will continue.      3. Ground-level fall  4. Brain concussion, without loss of consciousness, initial encounter  5. Contusion of occipital region of scalp, initial encounter  6. Dysmetria  7. Abnormality of gait  8. At risk for fall.   History and exam are concerning for concussion, posterior scalp contusion following a GLF 3 weeks ago. CT scan of the head done approximately 1 week after the incident was negative. She complains of gait imbalance and is concerned of increased risk of fall. She is working with physical therapy and notices improvement of gait.  Neurological exam was notable for mild dysmetria affecting the left hand and reduced arm swing on the right.  The rest of neurological exam was unremarkable.  I had long conversation with patient and her  regarding symptoms of concussion and prognosis.  She should continue to work with physical therapy.  Conservative management recommended and discussed.  Appropriate counseling regarding this condition discussed with patient.   -Plenty of sleep at night and rest during the day  -Avoid excessive  user and sensory stimuli such as computer, iPhone, iPad, or other electronic devices  -Well-balanced diet  -Avoid strenuous physical activity: house cleaning, gardening, exercise,     -Gentle exercise as tolerated: walking  -Avoid contact sports or activities fall risk of head injury that increase  -Avoid alcohol, tobacco, caffeine, or illicit drugs  -Avoid driving all operating machineries if dizziness, eyestrain is active.   - avoid more head injuries  - avoid activities that make symptoms worse  - graded return to exercises and sporting activities. Recommended working with patient's  on this.   - avoid prolonged reading or using computer  - frequent breaks  - seek immediate medical attention for fainting, syncope, visual changes, visual loss, vertigo, gait problems, imbalance, fall, focal weakness, numbness, slurred speech, etc.       Subjective:       HPI:     History of Present Illness  The patient presents for evaluation of a fall, trigeminal neuralgia, and osteoporosis.    Approximately 4 weeks ago, she fell backward while wearing backless slippers.  The posterior of her head impacted the hard floor to leading to acute pain, headache, and scalp edema. Denies loss of consciousness, nausea, vomiting, vision changes, focal weakness, paresthesia, facial droopiness, voice/speech changes.  Posterior scalp contusion eventually improved with conservative treatment.  She still has some minor discomfort in this area and intermittent headaches, intermittent right-sided facial discomfort.  There is no acute visual changes.  However, she does feel like discomfort around the right eye.  Patient was seen by urgent care a week later.  CT scan was negative for acute intracranial concerns.  Patient believes that her gait is slightly unstable.  She is working with physical therapy which appeared to help.   Since the incident, she is slowly returning to baseline.  She reports slight increased fatigue and unable to maintain  "the same level of exercises. She sleeps more and feels better after rest. She is managing pain with daily Aleve for arthritis, ice application, or Tylenol.    She continues to take the rest of her medications as directed.  Her blood pressure is controlled with amlodipine.  She is active and exercises regularly prior to above-mentioned incident.    She continues to follow-up with Dr. Woods in May for osteoporosis management. She received her first Reclast infusion on 12/27/2024.  She was on Evenity a year prior.   She takes daily calcium supplements.            Current Outpatient Medications:     diclofenac sodium (VOLTAREN) 1 % Gel, APPLY TO 2-4 GRAM TO AFFECTED AREA 4 TIMES DAILY, Disp: 100 g, Rfl: 0    valacyclovir (VALTREX) 1 GM Tab, TAKE 0.5 TABLET ONCE DAILY, Disp: 45 Tablet, Rfl: 1    amLODIPine (NORVASC) 5 MG Tab, Take 1 Tablet by mouth every day., Disp: 90 Tablet, Rfl: 3    atorvastatin (LIPITOR) 20 MG Tab, Take 1 Tablet by mouth every day., Disp: 90 Tablet, Rfl: 3    fluticasone (FLONASE) 50 MCG/ACT nasal spray, Administer 1 Spray into affected nostril(S) every day., Disp: 48 g, Rfl: 3    Magnesium 400 MG Cap, Take  by mouth., Disp: , Rfl:     Cholecalciferol (VITAMIN D) 2000 UNIT Tab, Take  by mouth every day., Disp: , Rfl:     CALCIUM 500 + D PO, Take  by mouth. 2 daily, Disp: , Rfl:      Objective:     Exam:  /68 (BP Location: Left arm, Patient Position: Sitting, BP Cuff Size: Adult)   Pulse 74   Temp 36.3 °C (97.3 °F) (Temporal)   Ht 1.549 m (5' 1\")   Wt 54.7 kg (120 lb 9.5 oz)   LMP 03/01/1986   SpO2 97%   BMI 22.79 kg/m²  Body mass index is 22.79 kg/m².    Constitutional: awake, alert, in no distress.  Skin: Warm, dry, good turgor, no rashes, bruises, ulcers in visible areas.  Eye: conjunctiva clear, lids neg for edema or lesions.  ENMT: TM and auditory canals wnl. Oral and nasal mucosa wnl. Lips without lesions, good dentition, oropharynx clear.  Neck: Trachea midline, no masses, no " thyromegaly. No cervical or supraclavicular lymphadenopathy  Respiratory: Unlabored respiratory effort, lungs clear to auscultation, no wheezes, no rales.  Cardiovascular: Normal S1, S2, no murmur, no pedal edema.  Psych: Oriented x3, affect and mood wnl, intact judgement and insight.   Physical Exam  Neurological:      General: No focal deficit present.      Mental Status: She is alert and oriented to person, place, and time.      Cranial Nerves: Cranial nerves 2-12 are intact.      Sensory: Sensation is intact.      Motor: Motor function is intact. No weakness, tremor, atrophy, abnormal muscle tone, seizure activity or pronator drift.      Coordination: Romberg sign negative. Coordination normal. Finger-Nose-Finger Test abnormal.      Gait: Gait and tandem walk normal.      Deep Tendon Reflexes:      Reflex Scores:       Tricep reflexes are 1+ on the right side and 1+ on the left side.       Bicep reflexes are 1+ on the right side and 1+ on the left side.       Brachioradialis reflexes are 1+ on the right side and 1+ on the left side.       Patellar reflexes are 3+ on the right side and 3+ on the left side.       Achilles reflexes are 1+ on the right side and 1+ on the left side.     Comments: (Slight dysmetria noted with finger-to-nose test on the left)  Reduced arms wing on the right with ambulation, otherwise, unremarkable gait.          Total time spent reviewing pt's chart, labs, notes, imaging, and counseling/prescribing medications to patient before, during, and after the visit: 40 minutes.        Return in about 6 months (around 7/7/2025) for Multiple issues.          Please note that this dictation was created using voice recognition software. I have made every reasonable attempt to correct obvious errors, but I expect that there are errors of grammar and possibly content that I did not discover before finalizing the note.

## 2025-03-27 DIAGNOSIS — A60.04 HERPES SIMPLEX VULVOVAGINITIS: ICD-10-CM

## 2025-03-27 RX ORDER — VALACYCLOVIR HYDROCHLORIDE 1 G/1
TABLET, FILM COATED ORAL
Qty: 45 TABLET | Refills: 0 | Status: SHIPPED | OUTPATIENT
Start: 2025-03-27

## 2025-04-01 ENCOUNTER — OFFICE VISIT (OUTPATIENT)
Dept: URGENT CARE | Facility: CLINIC | Age: 80
End: 2025-04-01
Payer: MEDICARE

## 2025-04-01 VITALS
RESPIRATION RATE: 16 BRPM | BODY MASS INDEX: 22.84 KG/M2 | WEIGHT: 121 LBS | HEART RATE: 79 BPM | DIASTOLIC BLOOD PRESSURE: 58 MMHG | TEMPERATURE: 97.9 F | OXYGEN SATURATION: 98 % | HEIGHT: 61 IN | SYSTOLIC BLOOD PRESSURE: 98 MMHG

## 2025-04-01 DIAGNOSIS — J01.90 ACUTE BACTERIAL SINUSITIS: ICD-10-CM

## 2025-04-01 DIAGNOSIS — B96.89 ACUTE BACTERIAL SINUSITIS: ICD-10-CM

## 2025-04-01 DIAGNOSIS — R05.1 ACUTE COUGH: ICD-10-CM

## 2025-04-01 PROCEDURE — 3078F DIAST BP <80 MM HG: CPT

## 2025-04-01 PROCEDURE — 3074F SYST BP LT 130 MM HG: CPT

## 2025-04-01 PROCEDURE — 99213 OFFICE O/P EST LOW 20 MIN: CPT

## 2025-04-01 RX ORDER — BENZONATATE 100 MG/1
100 CAPSULE ORAL 3 TIMES DAILY PRN
Qty: 60 CAPSULE | Refills: 0 | Status: SHIPPED | OUTPATIENT
Start: 2025-04-01

## 2025-04-01 ASSESSMENT — ENCOUNTER SYMPTOMS
SORE THROAT: 1
VOMITING: 0
FEVER: 0
HEADACHES: 1
WEAKNESS: 0
COUGH: 1
NAUSEA: 0
SHORTNESS OF BREATH: 0
DIARRHEA: 0
DIZZINESS: 0

## 2025-04-01 ASSESSMENT — COPD QUESTIONNAIRES: COPD: 0

## 2025-04-01 ASSESSMENT — FIBROSIS 4 INDEX: FIB4 SCORE: 1.403508771929824561

## 2025-04-01 NOTE — PROGRESS NOTES
"Subjective     Hermelinda Roger is a 79 y.o. female who presents with Cough (X 2 weeks now on/off productive, sore throat and headaches wake her up at night )            Cough  This is a new problem. The current episode started 1 to 4 weeks ago. The cough is Productive of sputum. Associated symptoms include headaches, nasal congestion and a sore throat. Pertinent negatives include no chest pain, fever, rash or shortness of breath. The symptoms are aggravated by lying down. She has tried OTC cough suppressant and rest for the symptoms. The treatment provided mild relief. There is no history of asthma, COPD or pneumonia.       Review of Systems   Constitutional:  Negative for fever and malaise/fatigue.   HENT:  Positive for sore throat. Negative for congestion.    Respiratory:  Positive for cough. Negative for shortness of breath.    Cardiovascular:  Negative for chest pain.   Gastrointestinal:  Negative for diarrhea, nausea and vomiting.   Skin:  Negative for rash.   Neurological:  Positive for headaches. Negative for dizziness and weakness.   All other systems reviewed and are negative.             Objective     BP 98/58 (BP Location: Right arm, Patient Position: Sitting, BP Cuff Size: Adult)   Pulse 79   Temp 36.6 °C (97.9 °F) (Temporal)   Resp 16   Ht 1.549 m (5' 1\")   Wt 54.9 kg (121 lb)   LMP 03/01/1986   SpO2 98%   BMI 22.86 kg/m²      Physical Exam  Vitals reviewed.   Constitutional:       Appearance: Normal appearance.   HENT:      Head: Normocephalic.      Right Ear: Tympanic membrane and ear canal normal.      Left Ear: Tympanic membrane and ear canal normal.      Nose:      Right Sinus: Frontal sinus tenderness present.      Left Sinus: Frontal sinus tenderness present.      Mouth/Throat:      Mouth: Mucous membranes are moist.      Pharynx: Oropharynx is clear.   Eyes:      Extraocular Movements: Extraocular movements intact.      Conjunctiva/sclera: Conjunctivae normal.   Cardiovascular:      " Rate and Rhythm: Normal rate and regular rhythm.      Pulses: Normal pulses.      Heart sounds: Normal heart sounds.   Pulmonary:      Effort: Pulmonary effort is normal.      Breath sounds: Normal breath sounds.   Abdominal:      General: Abdomen is flat.      Palpations: Abdomen is soft.   Musculoskeletal:         General: Normal range of motion.   Skin:     General: Skin is warm and dry.   Neurological:      Mental Status: She is alert and oriented to person, place, and time.   Psychiatric:         Behavior: Behavior normal.                                  Assessment & Plan  Acute bacterial sinusitis    Orders:    amoxicillin-clavulanate (AUGMENTIN) 875-125 MG Tab; Take 1 Tablet by mouth 2 times a day for 7 days.    Acute cough    Orders:    benzonatate (TESSALON) 100 MG Cap; Take 1 Capsule by mouth 3 times a day as needed for Cough.         Educated Hermelinda to take entire course of antibiotics even if symptoms improve or she begins to feel better.    Hermelinda can continue supportive care that was discussed in clinic such as alternating ibuprofen and acetaminophen, rest, plenty of fluids such as water, Pedialyte, low sugar Gatorade, broth, hot steamy showers, hot tea with honey, cough drops/throat spray, and a cool-mist humidifier by bed at night.    Shared decision-making was utilized with Hermelinda for plan of care. Discussed differential diagnoses, natural history, and supportive care. Reviewed indication for use and the potential benefits and side effects of medications. Hermelinda was encouraged to monitor symptoms closely and educated about signs and symptoms that would warrant concern and mandate seeking a higher level of service through the emergency department discussed at length. All questions answered to Hermelinda's satisfaction and they were in agreement with treatment plan at time of discharge.

## 2025-05-22 ENCOUNTER — TELEPHONE (OUTPATIENT)
Dept: MEDICAL GROUP | Facility: MEDICAL CENTER | Age: 80
End: 2025-05-22
Payer: MEDICARE

## 2025-05-22 ENCOUNTER — TELEPHONE (OUTPATIENT)
Dept: ENDOCRINOLOGY | Facility: MEDICAL CENTER | Age: 80
End: 2025-05-22
Payer: MEDICARE

## 2025-05-22 NOTE — TELEPHONE ENCOUNTER
Called and spoke with pt to remind them about upcoming apt and outstanding labs needed for next visit

## 2025-05-22 NOTE — TELEPHONE ENCOUNTER
Patient left message to schedule an annual appointment after July 29th. Called patient back and got voicemail. Left a message to either give us a call here at the clinic or to send us a message through [employer] on what days or times would work best.

## 2025-05-23 ENCOUNTER — HOSPITAL ENCOUNTER (OUTPATIENT)
Dept: LAB | Facility: MEDICAL CENTER | Age: 80
End: 2025-05-23
Attending: INTERNAL MEDICINE
Payer: MEDICARE

## 2025-05-23 DIAGNOSIS — E55.9 VITAMIN D DEFICIENCY: ICD-10-CM

## 2025-05-23 DIAGNOSIS — I10 PRIMARY HYPERTENSION: ICD-10-CM

## 2025-05-23 DIAGNOSIS — M81.0 AGE-RELATED OSTEOPOROSIS WITHOUT CURRENT PATHOLOGICAL FRACTURE: ICD-10-CM

## 2025-05-23 LAB
25(OH)D3 SERPL-MCNC: 75 NG/ML (ref 30–100)
ALBUMIN SERPL BCP-MCNC: 4 G/DL (ref 3.2–4.9)
ALBUMIN/GLOB SERPL: 1.3 G/DL
ALP SERPL-CCNC: 67 U/L (ref 30–99)
ALT SERPL-CCNC: 9 U/L (ref 2–50)
ANION GAP SERPL CALC-SCNC: 10 MMOL/L (ref 7–16)
AST SERPL-CCNC: 19 U/L (ref 12–45)
BILIRUB SERPL-MCNC: 0.5 MG/DL (ref 0.1–1.5)
BUN SERPL-MCNC: 21 MG/DL (ref 8–22)
CALCIUM ALBUM COR SERPL-MCNC: 9.3 MG/DL (ref 8.5–10.5)
CALCIUM SERPL-MCNC: 9.3 MG/DL (ref 8.5–10.5)
CHLORIDE SERPL-SCNC: 106 MMOL/L (ref 96–112)
CO2 SERPL-SCNC: 24 MMOL/L (ref 20–33)
CREAT SERPL-MCNC: 0.81 MG/DL (ref 0.5–1.4)
GFR SERPLBLD CREATININE-BSD FMLA CKD-EPI: 73 ML/MIN/1.73 M 2
GLOBULIN SER CALC-MCNC: 3.2 G/DL (ref 1.9–3.5)
GLUCOSE SERPL-MCNC: 90 MG/DL (ref 65–99)
PHOSPHATE SERPL-MCNC: 3 MG/DL (ref 2.5–4.5)
POTASSIUM SERPL-SCNC: 4.4 MMOL/L (ref 3.6–5.5)
PROT SERPL-MCNC: 7.2 G/DL (ref 6–8.2)
PTH-INTACT SERPL-MCNC: 39.1 PG/ML (ref 14–72)
SODIUM SERPL-SCNC: 140 MMOL/L (ref 135–145)

## 2025-05-23 PROCEDURE — 82523 COLLAGEN CROSSLINKS: CPT

## 2025-05-23 PROCEDURE — 36415 COLL VENOUS BLD VENIPUNCTURE: CPT

## 2025-05-23 PROCEDURE — 84100 ASSAY OF PHOSPHORUS: CPT

## 2025-05-23 PROCEDURE — 82306 VITAMIN D 25 HYDROXY: CPT

## 2025-05-23 PROCEDURE — 80053 COMPREHEN METABOLIC PANEL: CPT

## 2025-05-23 PROCEDURE — 83970 ASSAY OF PARATHORMONE: CPT

## 2025-05-24 LAB — COLLAGEN CTX SERPL-MCNC: 163 PG/ML

## 2025-05-29 ENCOUNTER — OFFICE VISIT (OUTPATIENT)
Dept: ENDOCRINOLOGY | Facility: MEDICAL CENTER | Age: 80
End: 2025-05-29
Attending: INTERNAL MEDICINE
Payer: MEDICARE

## 2025-05-29 VITALS
HEART RATE: 86 BPM | WEIGHT: 119.2 LBS | OXYGEN SATURATION: 98 % | DIASTOLIC BLOOD PRESSURE: 80 MMHG | SYSTOLIC BLOOD PRESSURE: 126 MMHG | BODY MASS INDEX: 22.51 KG/M2 | HEIGHT: 61 IN

## 2025-05-29 DIAGNOSIS — E55.9 VITAMIN D DEFICIENCY: ICD-10-CM

## 2025-05-29 DIAGNOSIS — M81.0 AGE-RELATED OSTEOPOROSIS WITHOUT CURRENT PATHOLOGICAL FRACTURE: Primary | ICD-10-CM

## 2025-05-29 DIAGNOSIS — I10 PRIMARY HYPERTENSION: ICD-10-CM

## 2025-05-29 PROCEDURE — 99214 OFFICE O/P EST MOD 30 MIN: CPT | Performed by: INTERNAL MEDICINE

## 2025-05-29 PROCEDURE — 99213 OFFICE O/P EST LOW 20 MIN: CPT | Performed by: INTERNAL MEDICINE

## 2025-05-29 PROCEDURE — 3074F SYST BP LT 130 MM HG: CPT | Performed by: INTERNAL MEDICINE

## 2025-05-29 PROCEDURE — 3079F DIAST BP 80-89 MM HG: CPT | Performed by: INTERNAL MEDICINE

## 2025-05-29 ASSESSMENT — FIBROSIS 4 INDEX: FIB4 SCORE: 1.666666666666666667

## 2025-05-29 NOTE — PROGRESS NOTES
Chief Complaint: Follow up for Osteoporosis/Osteopenia    HPI:     Hermelinda Roger is a 79 y.o. female here for follow up of the above medical issue.    She was diagnosed with osteoporosis by bone density scan in 7/3/2023 with the lowest T score of -2.6.  Prior to this she was had osteopenia diagnosed in 2009 and she was taking Fosamax but she still had bone loss. She used to live in [facility] [place] in [place].  She is a runner.  She was a dental hygienist.      Patient admits to history of fracture. (Traumatic fracture of finger in [address], Broke L ankle in 2001 and broke right ankle in 2014 from sports related injuries)   She had a total hysterectomy without oophorectomy in 1985.      She was on Evenity which was started on 10/2023 by Dr. Susana Miranda but  stopping giving Evenity in the office thus she switched to getting [facility]ity at [facility].     She completed 12 months of Evenity.    She started Reclast on 12/2024 for consolidation therapy    Her DEXA on 12/2024 showed improved T scores of -2.0 for the LS  Her FRAX scores are still high    Since last visit patient reports feeling excellent.  She denies interval fracture.    She reports interval fall with a concussion (fell in her kitchen while turning rapidly)  CT of her head on 12/2024 showed no ICH  She denies interval nephrolithiasis.    She denies dental issues  She reports adherence to calcium supplementation recommendations.    She takes Calcium citrate  650mg bid.     She reports adherence to vitamin D supplementation.    She takes vitamin D3 2000IU daily.     Her activity level: moderate regular exercise, aerobic  6 days a week      She reports excellent medication adherence and denies missing any doses.        Latest Reference Range & Units 05/23/25 06:30   25-Hydroxy   Vitamin D 25 30 - 100 ng/mL 75   C. Telopeptide B Cross Linked pg/mL 163   Pth, Intact 14.0 - 72.0 pg/mL 39.1         Last Bone Density  12/20/2024    Patient's medications, allergies, and social histories were reviewed and updated as appropriate.      ROS:       CONS:     No fever, no chills   EYES:     No diplopia, no blurry vision   CV:           No chest pain, no palpitations   PULM:     No SOB, no cough, no hemoptysis.   GI:            No nausea, no vomiting, no diarrhea, no constipation   ENDO:     No polyuria, no polydipsia, no heat intolerance, no cold intolerance     Past Medical History:  Problem List:  2025-01: Risk for falls  2024-01: Agatston CAC score, <100  2023-07: Age-related osteoporosis without current pathological   fracture  2023-07: Primary hypertension  2022-07: Presbycusis of both ears  2022-07: Venous insufficiency  2022-07: History of fracture of right ankle s/p ORIF in 2015 2021-12: Primary osteoarthritis of both hips, CAM impignement, GLORY s/  p PT and steroid injection  2021-07: Trigeminal neuralgia_R  2018-04: Chest discomfort  2018-04: Palpitations  2017-06: Herpes simplex vulvovaginitis  2015-01: Ankle fracture  2014-11: Neutropenia, drug-induced (HCC)  2014-03: Vitamin D insufficiency  2012-12: Lumbar radicular pain  2011-08: Generalized osteoarthritis  2011-08: Osteoarthritis of hip  2011-04: Iron deficiency anemia  2010-10: Atrophic vaginitis  2010-09: LBBB (left bundle branch block)  2010-08: Heart palpitations  2010-08: Preventative health care  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  Pure hypercholesterolemia  Gastroesophageal reflux disease without esophagitis  Raynaud's phenomenon  Seasonal allergies  Post zoster neuralgia  Leg length discrepancy  Osteopenia of multiple sites      Past Surgical History:  Past Surgical History:   Procedure Laterality Date    ORIF, ANKLE  1/6/15    [facility]'s    KNEE ARTHROSCOPY  12/29/2011    Performed by BRADFORD WEBB at SURGERY [facility] ORS    MENISCECTOMY, KNEE, MEDIAL  12/29/2011    Performed by BRADFORD WEBB at [facility] ORS    HYSTERECTOMY, VAGINAL  1985     "APPENDECTOMY  1980    was in [place]    LAPAROTOMY  1980    excision uterine fibroid    HARDWARE REMOVAL ORTHO  1979    left ring finger    HAND SURGERY  1978    ORIF left ring finger    CATARACT EXTRACTION WITH IOL  2007, 2008    Bilateral    TONSILLECTOMY AND ADENOIDECTOMY  as child        Allergies:  Carbamazepine, Nabumetone, Tape, and Tramadol     Social History:  Social History     Tobacco Use    Smoking status: Never    Smokeless tobacco: Never   Vaping Use    Vaping status: Never Used   Substance Use Topics    Alcohol use: Yes     Alcohol/week: 2.4 oz     Types: 4 Glasses of wine per week     Comment: 2-4 per week    Drug use: No        Family History:   family history includes Cancer in her paternal grandfather; Diabetes in her brother and maternal grandfather; Genitourinary () Problems in her mother; Heart Disease in her father and maternal grandfather; Heart Disease (age of onset: 42) in her brother; No Known Problems in her maternal grandmother and paternal grandmother.      PHYSICAL EXAM:   Vital signs: /80 (BP Location: Left arm, Patient Position: Sitting, BP Cuff Size: Adult)   Pulse 86   Ht 1.549 m (5' 1\")   Wt 54.1 kg (119 lb 3.2 oz)   LMP 03/01/1986   SpO2 98%   BMI 22.52 kg/m²   GENERAL: Well-developed, well-nourished in no apparent distress.   EYE:  No ocular asymmetry, PERRLA  HENT: Pink, moist mucous membranes.    NECK: No thyromegaly.   CARDIOVASCULAR:  No murmurs  LUNGS: Clear breath sounds  ABDOMEN: Soft, nontender   EXTREMITIES: No clubbing, cyanosis, or edema.   NEUROLOGICAL: No gross focal motor abnormalities   LYMPH: No cervical adenopathy palpated.   SKIN: No rashes, lesions.       Labs:    Lab Results   Component Value Date/Time    WBC 6.3 07/26/2024 06:19 AM    WBC 4.1 05/06/2013 07:59 AM    RBC 4.19 (L) 07/26/2024 06:19 AM    RBC 3.83 05/06/2013 07:59 AM    HEMOGLOBIN 14.1 07/26/2024 06:19 AM    .7 (H) 07/26/2024 06:19 AM    MCV 99 (H) 05/06/2013 07:59 AM    MCH " "33.7 (H) 2024 06:19 AM    MCH 31.9 2013 07:59 AM    MCHC 33.1 2024 06:19 AM    RDW 47.8 2024 06:19 AM    RDW 13.8 2013 07:59 AM    MPV 10.0 2024 06:19 AM       Lab Results   Component Value Date/Time    SODIUM 140 2025 06:30 AM    POTASSIUM 4.4 2025 06:30 AM    CHLORIDE 106 2025 06:30 AM    CO2 24 2025 06:30 AM    ANION 10.0 2025 06:30 AM    GLUCOSE 90 2025 06:30 AM    BUN 21 2025 06:30 AM    CREATININE 0.81 2025 06:30 AM    CREATININE 0.82 2013 07:59 AM    CALCIUM 9.3 2025 06:30 AM    ASTSGOT 19 2025 06:30 AM    ALTSGPT 9 2025 06:30 AM    TBILIRUBIN 0.5 2025 06:30 AM    ALBUMIN 4.0 2025 06:30 AM    ALBUMIN 4.17 2024 06:18 AM    TOTPROTEIN 7.2 2025 06:30 AM    TOTPROTEIN 7.2 2024 06:18 AM    GLOBULIN 3.2 2025 06:30 AM    AGRATIO 1.3 2025 06:30 AM       Lab Results   Component Value Date/Time    TSHULTRASEN 3.330 2024 0618     Lab Results   Component Value Date/Time    FREET4 1.05 2024 0618     No results found for: \"FREET3\"  No results found for: \"THYSTIMIG\"        Imagin/3/2023 7:59 AM     HISTORY/REASON FOR EXAM:  Postmenopausal, hysterectomy, osteopenia of the lumbar spine and left femur, history of fracture     TECHNIQUE/EXAM DESCRIPTION AND NUMBER OF VIEWS:  Dual x-ray bone densitometry was performed from the L1 through L4 levels and from the proximal left femur utilizing the GE Prodigy unit.     COMPARISON: Bone densitometry scan 2020     FINDINGS:  The lumbar spine has a mean bone mineral density of 0.864 g/cm2, with a T score of -2.6 and a Z score of -0.5.     The proximal left femur has a mean bone mineral density of 0.733 g/cm2, with a T score of -2.2 and a Z score of 0.0.     When compared with the most recent study dated 2020, there has been a 2.9% decrease in the bone mineral density of the lumbar spine and a 2.1% decrease in the " bone mineral density of the left femur.     IMPRESSION:     According to the World Health Organization classification, bone mineral density of this patient is osteoporotic in the lumbar spine, osteopenic in the left femur with no significant change.     10-year Probability of Fracture:  Major Osteoporotic     21.1%  Hip     6.1%  Population      [place] ()     Based on left femur neck BMD     INTERPRETING LOCATION: [address] [place], [place] [address], [id number]    12/20/2024 9:09 AM     HISTORY/REASON FOR EXAM:  Follow up for osteoporosis L1-L4 levels and osteopenia proximal left femur, assessment post Evenity, postmenopausal, hysterectomy, adult bone fracture, premature menopause, family history of osteoporosis.     TECHNIQUE/EXAM DESCRIPTION AND NUMBER OF VIEWS:  Dual x-ray bone densitometry was performed from the L1, L2 and L4 levels and from the proximal left femur utilizing the GE Prodigy unit.     COMPARISON: Bone densitometry scan 7/3/2023.     FINDINGS:  The lumbar spine has a mean bone mineral density of 0.937 g/cm2, with a T score of -2.0 and a Z score of 0.2.     The proximal left femur has a mean bone mineral density of 0.725 g/cm2, with a T score of -2.2 and a Z score of 0.0.     When compared with the most recent study dated 7/3/2023, there has been a 12.5% increase in the bone mineral density of the lumbar spine and a 1.1% decrease in the bone mineral density of the proximal left femur.     IMPRESSION:     According to the World Health Organization classification, bone mineral density of this patient is osteopenic in the spine and osteopenic in the proximal left femur.     10-year Probability of Fracture:  Major Osteoporotic     23.3%  Hip     7.2%  Population      [place] ()     Based on left femur neck BMD     INTERPRETING LOCATION: [address] [place] [address] [place], [id number]  ASSESSMENT/PLAN:     1. Age-related osteoporosis without current pathological fracture  Stable  Improved- she now has osteopenia  in the LS  Continue IV Reclast for consolidation therapy   Discussed the importance of regular weightbearing exercise  Discussed the importance of good dental hygiene and regular dental visits  Discussed the importance of adequate calcium and vitamin supplementation  Reviewed fall precautions  She should notify me if she has any falls or fractures  Repeat DEXA on 12/20/2026  RTC in 12 mos    2. Vitamin D deficiency  Stable   Vitamin D labs were reviewed with patient  Continue current supplements  Continue monitoring levels       3. Primary hypertension  Controlled continue current medications      Return in about 1 year (around 5/29/2026).      Thank you kindly for allowing me to participate in the endocrine care plan for this patient.    Victor M Woods MD, FACE, [facility]      CC:   Johana Felix M.D.

## 2025-06-20 ENCOUNTER — TELEPHONE (OUTPATIENT)
Dept: MEDICAL GROUP | Facility: MEDICAL CENTER | Age: 80
End: 2025-06-20
Payer: MEDICARE

## 2025-06-20 DIAGNOSIS — E78.00 PURE HYPERCHOLESTEROLEMIA: ICD-10-CM

## 2025-06-20 DIAGNOSIS — I73.00 RAYNAUD'S PHENOMENON WITHOUT GANGRENE: ICD-10-CM

## 2025-06-20 NOTE — TELEPHONE ENCOUNTER
Pt is scheduled out til September and he prescriptions will  before then, I told her to send a message through my chart to see what she can do for that.

## 2025-06-21 DIAGNOSIS — A60.04 HERPES SIMPLEX VULVOVAGINITIS: ICD-10-CM

## 2025-06-23 RX ORDER — VALACYCLOVIR HYDROCHLORIDE 1 G/1
TABLET, FILM COATED ORAL
Qty: 45 TABLET | Refills: 1 | Status: SHIPPED | OUTPATIENT
Start: 2025-06-23

## 2025-06-23 RX ORDER — AMLODIPINE BESYLATE 5 MG/1
5 TABLET ORAL
Qty: 90 TABLET | Refills: 0 | Status: SHIPPED | OUTPATIENT
Start: 2025-06-23

## 2025-06-23 RX ORDER — ATORVASTATIN CALCIUM 20 MG/1
20 TABLET, FILM COATED ORAL
Qty: 90 TABLET | Refills: 0 | Status: SHIPPED | OUTPATIENT
Start: 2025-06-23

## 2025-06-23 NOTE — TELEPHONE ENCOUNTER
I approved 90-day refills of her medications. Patient is notified through [employer].   Johana Felix M.D.

## 2025-08-08 DIAGNOSIS — E78.00 PURE HYPERCHOLESTEROLEMIA: ICD-10-CM

## 2025-08-09 ENCOUNTER — OFFICE VISIT (OUTPATIENT)
Dept: URGENT CARE | Facility: CLINIC | Age: 80
End: 2025-08-09
Payer: MEDICARE

## 2025-08-09 VITALS
DIASTOLIC BLOOD PRESSURE: 84 MMHG | HEIGHT: 61 IN | BODY MASS INDEX: 22.47 KG/M2 | HEART RATE: 77 BPM | OXYGEN SATURATION: 97 % | RESPIRATION RATE: 16 BRPM | SYSTOLIC BLOOD PRESSURE: 122 MMHG | WEIGHT: 119 LBS | TEMPERATURE: 97.6 F

## 2025-08-09 DIAGNOSIS — L08.9 INFECTION OF RIGHT FOOT: Primary | ICD-10-CM

## 2025-08-09 DIAGNOSIS — I73.00 RAYNAUD'S PHENOMENON WITHOUT GANGRENE: ICD-10-CM

## 2025-08-09 PROCEDURE — 3074F SYST BP LT 130 MM HG: CPT | Performed by: PHYSICIAN ASSISTANT

## 2025-08-09 PROCEDURE — 99213 OFFICE O/P EST LOW 20 MIN: CPT | Performed by: PHYSICIAN ASSISTANT

## 2025-08-09 PROCEDURE — 3079F DIAST BP 80-89 MM HG: CPT | Performed by: PHYSICIAN ASSISTANT

## 2025-08-09 RX ORDER — ATORVASTATIN CALCIUM 20 MG/1
20 TABLET, FILM COATED ORAL
Qty: 90 TABLET | Refills: 0 | Status: SHIPPED | OUTPATIENT
Start: 2025-08-09

## 2025-08-09 RX ORDER — CEPHALEXIN 500 MG/1
500 CAPSULE ORAL 4 TIMES DAILY
Qty: 28 CAPSULE | Refills: 0 | Status: SHIPPED | OUTPATIENT
Start: 2025-08-09 | End: 2025-08-16

## 2025-08-09 ASSESSMENT — FIBROSIS 4 INDEX: FIB4 SCORE: 1.6875

## 2025-08-11 RX ORDER — AMLODIPINE BESYLATE 5 MG/1
5 TABLET ORAL
Qty: 90 TABLET | Refills: 0 | Status: SHIPPED | OUTPATIENT
Start: 2025-08-11

## 2025-08-26 ENCOUNTER — PATIENT MESSAGE (OUTPATIENT)
Dept: MEDICAL GROUP | Facility: MEDICAL CENTER | Age: 80
End: 2025-08-26
Payer: MEDICARE

## 2025-08-26 DIAGNOSIS — H91.90 HEARING LOSS, UNSPECIFIED HEARING LOSS TYPE, UNSPECIFIED LATERALITY: Primary | ICD-10-CM

## 2025-08-27 ENCOUNTER — OFFICE VISIT (OUTPATIENT)
Dept: MEDICAL GROUP | Facility: LAB | Age: 80
End: 2025-08-27
Payer: MEDICARE

## 2025-08-27 VITALS
WEIGHT: 119 LBS | DIASTOLIC BLOOD PRESSURE: 58 MMHG | HEART RATE: 85 BPM | HEIGHT: 61 IN | TEMPERATURE: 97.9 F | SYSTOLIC BLOOD PRESSURE: 100 MMHG | BODY MASS INDEX: 22.47 KG/M2 | OXYGEN SATURATION: 95 %

## 2025-08-27 DIAGNOSIS — H91.90 HEARING LOSS, UNSPECIFIED HEARING LOSS TYPE, UNSPECIFIED LATERALITY: Primary | ICD-10-CM

## 2025-08-27 PROCEDURE — 3074F SYST BP LT 130 MM HG: CPT | Performed by: PHYSICIAN ASSISTANT

## 2025-08-27 PROCEDURE — 3078F DIAST BP <80 MM HG: CPT | Performed by: PHYSICIAN ASSISTANT

## 2025-08-27 PROCEDURE — 99213 OFFICE O/P EST LOW 20 MIN: CPT | Performed by: PHYSICIAN ASSISTANT

## 2025-08-27 ASSESSMENT — FIBROSIS 4 INDEX: FIB4 SCORE: 1.6875

## 2025-09-02 ENCOUNTER — APPOINTMENT (OUTPATIENT)
Facility: MEDICAL CENTER | Age: 80
End: 2025-09-02
Attending: NURSE PRACTITIONER
Payer: MEDICARE

## 2025-09-02 ENCOUNTER — APPOINTMENT (OUTPATIENT)
Dept: MEDICAL GROUP | Facility: MEDICAL CENTER | Age: 80
End: 2025-09-02
Payer: MEDICARE